# Patient Record
Sex: FEMALE | Race: WHITE | Employment: OTHER | URBAN - METROPOLITAN AREA
[De-identification: names, ages, dates, MRNs, and addresses within clinical notes are randomized per-mention and may not be internally consistent; named-entity substitution may affect disease eponyms.]

---

## 2017-03-10 ENCOUNTER — HOSPITAL ENCOUNTER (OUTPATIENT)
Dept: GENERAL RADIOLOGY | Age: 62
Discharge: HOME | End: 2017-03-10
Payer: COMMERCIAL

## 2017-03-10 ENCOUNTER — HOSPITAL ENCOUNTER (OUTPATIENT)
Age: 62
Discharge: HOME | End: 2017-03-10
Payer: COMMERCIAL

## 2017-03-10 DIAGNOSIS — J44.9 OBSTRUCTIVE CHRONIC BRONCHITIS WITHOUT EXACERBATION (HCC): ICD-10-CM

## 2017-03-10 PROCEDURE — 71020 XR CHEST STANDARD TWO VW: CPT

## 2017-04-17 ENCOUNTER — HOSPITAL ENCOUNTER (OUTPATIENT)
Age: 62
Discharge: HOME | End: 2017-04-17
Payer: COMMERCIAL

## 2017-04-17 LAB — VITAMIN D 25-HYDROXY: 14.8 NG/ML (ref 30–100)

## 2017-04-17 PROCEDURE — 36415 COLL VENOUS BLD VENIPUNCTURE: CPT

## 2017-04-17 PROCEDURE — 82306 VITAMIN D 25 HYDROXY: CPT

## 2017-04-17 PROCEDURE — 83036 HEMOGLOBIN GLYCOSYLATED A1C: CPT

## 2017-04-18 LAB
ESTIMATED AVERAGE GLUCOSE: 169 MG/DL
HBA1C MFR BLD: 7.5 % (ref 4–6)

## 2017-05-31 ENCOUNTER — HOSPITAL ENCOUNTER (EMERGENCY)
Age: 62
Discharge: HOME | End: 2017-05-31
Attending: EMERGENCY MEDICINE
Payer: COMMERCIAL

## 2017-05-31 ENCOUNTER — APPOINTMENT (OUTPATIENT)
Dept: GENERAL RADIOLOGY | Age: 62
End: 2017-05-31
Payer: COMMERCIAL

## 2017-05-31 VITALS
DIASTOLIC BLOOD PRESSURE: 64 MMHG | OXYGEN SATURATION: 94 % | RESPIRATION RATE: 16 BRPM | SYSTOLIC BLOOD PRESSURE: 104 MMHG | TEMPERATURE: 99 F | HEART RATE: 78 BPM

## 2017-05-31 DIAGNOSIS — S46.912A LEFT SHOULDER STRAIN, INITIAL ENCOUNTER: Primary | ICD-10-CM

## 2017-05-31 PROCEDURE — 73030 X-RAY EXAM OF SHOULDER: CPT

## 2017-05-31 PROCEDURE — 99283 EMERGENCY DEPT VISIT LOW MDM: CPT

## 2017-05-31 ASSESSMENT — PAIN DESCRIPTION - LOCATION: LOCATION: SHOULDER

## 2017-05-31 ASSESSMENT — PAIN SCALES - GENERAL: PAINLEVEL_OUTOF10: 2

## 2017-05-31 ASSESSMENT — ENCOUNTER SYMPTOMS
SORE THROAT: 0
SHORTNESS OF BREATH: 0
EYE DISCHARGE: 0
VOMITING: 0
COUGH: 0
ABDOMINAL PAIN: 0
EYE REDNESS: 0
NAUSEA: 0
BACK PAIN: 0

## 2017-05-31 ASSESSMENT — PAIN DESCRIPTION - FREQUENCY: FREQUENCY: CONTINUOUS

## 2017-05-31 ASSESSMENT — PAIN DESCRIPTION - ORIENTATION: ORIENTATION: LEFT

## 2017-05-31 ASSESSMENT — PAIN DESCRIPTION - PAIN TYPE: TYPE: ACUTE PAIN

## 2017-05-31 ASSESSMENT — PAIN DESCRIPTION - DESCRIPTORS: DESCRIPTORS: ACHING

## 2017-07-19 ENCOUNTER — HOSPITAL ENCOUNTER (OUTPATIENT)
Age: 62
Discharge: HOME | End: 2017-07-19
Payer: COMMERCIAL

## 2017-07-19 LAB
ALBUMIN SERPL-MCNC: 4.3 G/DL (ref 3.5–5.2)
ALBUMIN/GLOBULIN RATIO: 1.7 (ref 1–2.5)
ALP BLD-CCNC: 79 U/L (ref 35–104)
ALT SERPL-CCNC: 21 U/L (ref 5–33)
ANION GAP SERPL CALCULATED.3IONS-SCNC: 15 MMOL/L (ref 9–17)
AST SERPL-CCNC: 22 U/L
BILIRUB SERPL-MCNC: 0.78 MG/DL (ref 0.3–1.2)
BUN BLDV-MCNC: 10 MG/DL (ref 8–23)
BUN/CREAT BLD: ABNORMAL (ref 9–20)
CALCIUM IONIZED: 1.42 MMOL/L (ref 1.13–1.33)
CALCIUM SERPL-MCNC: 10.6 MG/DL (ref 8.6–10.4)
CHLORIDE BLD-SCNC: 100 MMOL/L (ref 98–107)
CHOLESTEROL, FASTING: 134 MG/DL
CHOLESTEROL/HDL RATIO: 2.4
CO2: 26 MMOL/L (ref 20–31)
CREAT SERPL-MCNC: 0.48 MG/DL (ref 0.5–0.9)
CREATININE URINE: 57.6 MG/DL (ref 28–217)
ESTIMATED AVERAGE GLUCOSE: 174 MG/DL
GFR AFRICAN AMERICAN: >60 ML/MIN
GFR NON-AFRICAN AMERICAN: >60 ML/MIN
GFR SERPL CREATININE-BSD FRML MDRD: ABNORMAL ML/MIN/{1.73_M2}
GFR SERPL CREATININE-BSD FRML MDRD: ABNORMAL ML/MIN/{1.73_M2}
GLUCOSE FASTING: 174 MG/DL (ref 70–99)
HBA1C MFR BLD: 7.7 % (ref 4–6)
HDLC SERPL-MCNC: 55 MG/DL
HEPATITIS C ANTIBODY: NONREACTIVE
HIV AG/AB: NONREACTIVE
HOMOCYSTEINE: 12.9 UMOL/L
LDL CHOLESTEROL: 52 MG/DL (ref 0–130)
MICROALBUMIN/CREAT 24H UR: <12 MG/L
MICROALBUMIN/CREAT UR-RTO: 21 MCG/MG CREAT
POTASSIUM SERPL-SCNC: 3.8 MMOL/L (ref 3.7–5.3)
PTH INTACT: 62.67 PG/ML (ref 15–65)
SODIUM BLD-SCNC: 141 MMOL/L (ref 135–144)
TOTAL PROTEIN: 6.8 G/DL (ref 6.4–8.3)
TRIGLYCERIDE, FASTING: 136 MG/DL
VITAMIN B-12: 304 PG/ML (ref 211–946)
VITAMIN D 25-HYDROXY: 50.9 NG/ML (ref 30–100)
VLDLC SERPL CALC-MCNC: NORMAL MG/DL (ref 1–30)

## 2017-07-19 PROCEDURE — 83921 ORGANIC ACID SINGLE QUANT: CPT

## 2017-07-19 PROCEDURE — 80061 LIPID PANEL: CPT

## 2017-07-19 PROCEDURE — 87389 HIV-1 AG W/HIV-1&-2 AB AG IA: CPT

## 2017-07-19 PROCEDURE — 82330 ASSAY OF CALCIUM: CPT

## 2017-07-19 PROCEDURE — 80053 COMPREHEN METABOLIC PANEL: CPT

## 2017-07-19 PROCEDURE — 83090 ASSAY OF HOMOCYSTEINE: CPT

## 2017-07-19 PROCEDURE — 36415 COLL VENOUS BLD VENIPUNCTURE: CPT

## 2017-07-19 PROCEDURE — 83970 ASSAY OF PARATHORMONE: CPT

## 2017-07-19 PROCEDURE — 83036 HEMOGLOBIN GLYCOSYLATED A1C: CPT

## 2017-07-19 PROCEDURE — 82570 ASSAY OF URINE CREATININE: CPT

## 2017-07-19 PROCEDURE — 82043 UR ALBUMIN QUANTITATIVE: CPT

## 2017-07-19 PROCEDURE — 82607 VITAMIN B-12: CPT

## 2017-07-19 PROCEDURE — 82306 VITAMIN D 25 HYDROXY: CPT

## 2017-07-19 PROCEDURE — 86803 HEPATITIS C AB TEST: CPT

## 2017-07-21 LAB — METHYLMALONIC ACID: <0.1 UMOL/L (ref 0–0.4)

## 2017-10-16 ENCOUNTER — HOSPITAL ENCOUNTER (OUTPATIENT)
Age: 62
Discharge: HOME | End: 2017-10-16
Payer: MEDICARE

## 2017-10-16 PROCEDURE — 36415 COLL VENOUS BLD VENIPUNCTURE: CPT

## 2017-10-16 PROCEDURE — 83036 HEMOGLOBIN GLYCOSYLATED A1C: CPT

## 2017-10-17 LAB
ESTIMATED AVERAGE GLUCOSE: 166 MG/DL
HBA1C MFR BLD: 7.4 % (ref 4–6)

## 2018-01-23 ENCOUNTER — HOSPITAL ENCOUNTER (OUTPATIENT)
Age: 63
Discharge: HOME | End: 2018-01-23
Payer: MEDICARE

## 2018-01-23 LAB
ABSOLUTE EOS #: 0.2 K/UL (ref 0–0.4)
ABSOLUTE IMMATURE GRANULOCYTE: NORMAL K/UL (ref 0–0.3)
ABSOLUTE LYMPH #: 2.4 K/UL (ref 1–4.8)
ABSOLUTE MONO #: 0.6 K/UL (ref 0.1–1.2)
ALBUMIN SERPL-MCNC: 4.4 G/DL (ref 3.5–5.2)
ALBUMIN/GLOBULIN RATIO: 1.6 (ref 1–2.5)
ALP BLD-CCNC: 79 U/L (ref 35–104)
ALT SERPL-CCNC: 19 U/L (ref 5–33)
ANION GAP SERPL CALCULATED.3IONS-SCNC: 15 MMOL/L (ref 9–17)
AST SERPL-CCNC: 17 U/L
BASOPHILS # BLD: 0 % (ref 0–2)
BASOPHILS ABSOLUTE: 0 K/UL (ref 0–0.2)
BILIRUB SERPL-MCNC: 0.92 MG/DL (ref 0.3–1.2)
BUN BLDV-MCNC: 8 MG/DL (ref 8–23)
BUN/CREAT BLD: ABNORMAL (ref 9–20)
CALCIUM SERPL-MCNC: 10.5 MG/DL (ref 8.6–10.4)
CHLORIDE BLD-SCNC: 101 MMOL/L (ref 98–107)
CHOLESTEROL, FASTING: 140 MG/DL
CHOLESTEROL/HDL RATIO: 2.5
CO2: 27 MMOL/L (ref 20–31)
CREAT SERPL-MCNC: 0.45 MG/DL (ref 0.5–0.9)
CREATININE URINE: 124.3 MG/DL (ref 28–217)
DIFFERENTIAL TYPE: NORMAL
EOSINOPHILS RELATIVE PERCENT: 2 % (ref 1–4)
GFR AFRICAN AMERICAN: >60 ML/MIN
GFR NON-AFRICAN AMERICAN: >60 ML/MIN
GFR SERPL CREATININE-BSD FRML MDRD: ABNORMAL ML/MIN/{1.73_M2}
GFR SERPL CREATININE-BSD FRML MDRD: ABNORMAL ML/MIN/{1.73_M2}
GLUCOSE FASTING: 163 MG/DL (ref 70–99)
HCT VFR BLD CALC: 42.2 % (ref 36–46)
HDLC SERPL-MCNC: 57 MG/DL
HEMOGLOBIN: 13.9 G/DL (ref 12–16)
HOMOCYSTEINE: 13.9 UMOL/L
IMMATURE GRANULOCYTES: NORMAL %
LDL CHOLESTEROL: 49 MG/DL (ref 0–130)
LYMPHOCYTES # BLD: 27 % (ref 24–44)
MAGNESIUM: 2.2 MG/DL (ref 1.6–2.6)
MCH RBC QN AUTO: 29.6 PG (ref 26–34)
MCHC RBC AUTO-ENTMCNC: 33 G/DL (ref 31–37)
MCV RBC AUTO: 89.7 FL (ref 80–100)
MICROALBUMIN/CREAT 24H UR: <12 MG/L
MICROALBUMIN/CREAT UR-RTO: NORMAL MCG/MG CREAT
MONOCYTES # BLD: 7 % (ref 2–11)
NRBC AUTOMATED: NORMAL PER 100 WBC
PDW BLD-RTO: 13.3 % (ref 12.5–15.4)
PLATELET # BLD: 237 K/UL (ref 140–450)
PLATELET ESTIMATE: NORMAL
PMV BLD AUTO: 10.6 FL (ref 6–12)
POTASSIUM SERPL-SCNC: 4.1 MMOL/L (ref 3.7–5.3)
RBC # BLD: 4.7 M/UL (ref 4–5.2)
RBC # BLD: NORMAL 10*6/UL
SEG NEUTROPHILS: 64 % (ref 36–66)
SEGMENTED NEUTROPHILS ABSOLUTE COUNT: 5.6 K/UL (ref 1.8–7.7)
SODIUM BLD-SCNC: 143 MMOL/L (ref 135–144)
TOTAL PROTEIN: 7.1 G/DL (ref 6.4–8.3)
TRIGLYCERIDE, FASTING: 170 MG/DL
VITAMIN B-12: 261 PG/ML (ref 232–1245)
VITAMIN D 25-HYDROXY: 23.4 NG/ML (ref 30–100)
VLDLC SERPL CALC-MCNC: ABNORMAL MG/DL (ref 1–30)
WBC # BLD: 8.8 K/UL (ref 3.5–11)
WBC # BLD: NORMAL 10*3/UL

## 2018-01-23 PROCEDURE — 82607 VITAMIN B-12: CPT

## 2018-01-23 PROCEDURE — 82043 UR ALBUMIN QUANTITATIVE: CPT

## 2018-01-23 PROCEDURE — 82306 VITAMIN D 25 HYDROXY: CPT

## 2018-01-23 PROCEDURE — 83921 ORGANIC ACID SINGLE QUANT: CPT

## 2018-01-23 PROCEDURE — 83090 ASSAY OF HOMOCYSTEINE: CPT

## 2018-01-23 PROCEDURE — 80053 COMPREHEN METABOLIC PANEL: CPT

## 2018-01-23 PROCEDURE — 80061 LIPID PANEL: CPT

## 2018-01-23 PROCEDURE — 82570 ASSAY OF URINE CREATININE: CPT

## 2018-01-23 PROCEDURE — 85025 COMPLETE CBC W/AUTO DIFF WBC: CPT

## 2018-01-23 PROCEDURE — 83735 ASSAY OF MAGNESIUM: CPT

## 2018-01-26 LAB — METHYLMALONIC ACID: 0.11 UMOL/L (ref 0–0.4)

## 2018-02-06 ENCOUNTER — HOSPITAL ENCOUNTER (OUTPATIENT)
Age: 63
Discharge: HOME | End: 2018-02-06
Payer: MEDICARE

## 2018-02-06 ENCOUNTER — HOSPITAL ENCOUNTER (OUTPATIENT)
Dept: ULTRASOUND IMAGING | Age: 63
Discharge: HOME | End: 2018-02-08
Payer: MEDICARE

## 2018-02-06 DIAGNOSIS — E83.52 HYPERCALCEMIA: ICD-10-CM

## 2018-02-06 LAB
CALCIUM IONIZED: 1.51 MMOL/L (ref 1.13–1.33)
PTH INTACT: 77.27 PG/ML (ref 15–65)
VITAMIN D 25-HYDROXY: 25.8 NG/ML (ref 30–100)

## 2018-02-06 PROCEDURE — 82306 VITAMIN D 25 HYDROXY: CPT

## 2018-02-06 PROCEDURE — 83970 ASSAY OF PARATHORMONE: CPT

## 2018-02-06 PROCEDURE — 82330 ASSAY OF CALCIUM: CPT

## 2018-02-06 PROCEDURE — 36415 COLL VENOUS BLD VENIPUNCTURE: CPT

## 2018-02-06 PROCEDURE — 76536 US EXAM OF HEAD AND NECK: CPT

## 2020-10-27 ENCOUNTER — HOSPITAL ENCOUNTER (OUTPATIENT)
Dept: MAMMOGRAPHY | Age: 65
Discharge: HOME | End: 2020-10-29
Payer: MEDICARE

## 2020-10-27 PROCEDURE — 77063 BREAST TOMOSYNTHESIS BI: CPT

## 2021-01-04 ENCOUNTER — HOSPITAL ENCOUNTER (OUTPATIENT)
Dept: WOMENS IMAGING | Age: 66
Discharge: HOME | End: 2021-01-06
Payer: MEDICARE

## 2021-01-04 DIAGNOSIS — E04.9 GOITER: ICD-10-CM

## 2021-01-04 DIAGNOSIS — M81.0 AGE-RELATED OSTEOPOROSIS WITHOUT CURRENT PATHOLOGICAL FRACTURE: ICD-10-CM

## 2021-01-04 DIAGNOSIS — E21.0 PRIMARY HYPERPARATHYROIDISM (HCC): ICD-10-CM

## 2021-01-04 PROCEDURE — 77080 DXA BONE DENSITY AXIAL: CPT

## 2021-01-04 PROCEDURE — 76536 US EXAM OF HEAD AND NECK: CPT

## 2022-03-16 ENCOUNTER — HOSPITAL ENCOUNTER (OUTPATIENT)
Dept: NON INVASIVE DIAGNOSTICS | Age: 67
Discharge: HOME | End: 2022-03-18
Payer: MEDICARE

## 2022-03-16 DIAGNOSIS — J44.9 CHRONIC OBSTRUCTIVE PULMONARY DISEASE, UNSPECIFIED COPD TYPE (HCC): ICD-10-CM

## 2022-03-16 DIAGNOSIS — R06.09 DYSPNEA ON EXERTION: ICD-10-CM

## 2022-03-16 LAB
LV EF: 55 %
LVEF MODALITY: NORMAL

## 2022-03-16 PROCEDURE — 93306 TTE W/DOPPLER COMPLETE: CPT

## 2022-05-12 ENCOUNTER — HOSPITAL ENCOUNTER (OUTPATIENT)
Age: 67
Discharge: HOME | End: 2022-05-14

## 2022-05-12 ENCOUNTER — HOSPITAL ENCOUNTER (OUTPATIENT)
Age: 67
Discharge: HOME | End: 2022-05-12
Payer: MEDICARE

## 2022-05-12 ENCOUNTER — HOSPITAL ENCOUNTER (OUTPATIENT)
Dept: GENERAL RADIOLOGY | Age: 67
Discharge: HOME | End: 2022-05-14
Payer: MEDICARE

## 2022-05-12 DIAGNOSIS — R06.09 OTHER FORM OF DYSPNEA: ICD-10-CM

## 2022-05-12 LAB
ANION GAP SERPL CALCULATED.3IONS-SCNC: 14 MMOL/L (ref 9–17)
BUN BLDV-MCNC: 8 MG/DL (ref 8–23)
CALCIUM IONIZED: 1.31 MMOL/L (ref 1.13–1.33)
CALCIUM SERPL-MCNC: 9.5 MG/DL (ref 8.6–10.4)
CHLORIDE BLD-SCNC: 99 MMOL/L (ref 98–107)
CO2: 24 MMOL/L (ref 20–31)
CREAT SERPL-MCNC: 0.55 MG/DL (ref 0.5–0.9)
GFR AFRICAN AMERICAN: >60 ML/MIN
GFR NON-AFRICAN AMERICAN: >60 ML/MIN
GFR SERPL CREATININE-BSD FRML MDRD: NORMAL ML/MIN/{1.73_M2}
GLUCOSE BLD-MCNC: 95 MG/DL (ref 70–99)
PHOSPHORUS: 3.7 MG/DL (ref 2.6–4.5)
POTASSIUM SERPL-SCNC: 4.2 MMOL/L (ref 3.7–5.3)
PTH INTACT: 44.48 PG/ML (ref 15–65)
SODIUM BLD-SCNC: 137 MMOL/L (ref 135–144)

## 2022-05-12 PROCEDURE — 36415 COLL VENOUS BLD VENIPUNCTURE: CPT

## 2022-05-12 PROCEDURE — 83970 ASSAY OF PARATHORMONE: CPT

## 2022-05-12 PROCEDURE — 83036 HEMOGLOBIN GLYCOSYLATED A1C: CPT

## 2022-05-12 PROCEDURE — 80048 BASIC METABOLIC PNL TOTAL CA: CPT

## 2022-05-12 PROCEDURE — 82330 ASSAY OF CALCIUM: CPT

## 2022-05-12 PROCEDURE — 84100 ASSAY OF PHOSPHORUS: CPT

## 2022-05-12 PROCEDURE — 71046 X-RAY EXAM CHEST 2 VIEWS: CPT

## 2022-05-13 LAB
ESTIMATED AVERAGE GLUCOSE: 157 MG/DL
HBA1C MFR BLD: 7.1 % (ref 4–6)

## 2022-08-19 ENCOUNTER — HOSPITAL ENCOUNTER (OUTPATIENT)
Age: 67
Discharge: HOME | End: 2022-08-19
Payer: MEDICARE

## 2022-08-19 LAB
ANION GAP SERPL CALCULATED.3IONS-SCNC: 16 MMOL/L (ref 9–17)
BUN BLDV-MCNC: 11 MG/DL (ref 8–23)
CALCIUM SERPL-MCNC: 9.4 MG/DL (ref 8.6–10.4)
CHLORIDE BLD-SCNC: 105 MMOL/L (ref 98–107)
CO2: 23 MMOL/L (ref 20–31)
CREAT SERPL-MCNC: 0.58 MG/DL (ref 0.5–0.9)
CREATININE URINE: 49.6 MG/DL (ref 28–217)
GFR AFRICAN AMERICAN: >60 ML/MIN
GFR NON-AFRICAN AMERICAN: >60 ML/MIN
GFR SERPL CREATININE-BSD FRML MDRD: NORMAL ML/MIN/{1.73_M2}
GLUCOSE BLD-MCNC: 97 MG/DL (ref 70–99)
MICROALBUMIN/CREAT 24H UR: 41 MG/L
MICROALBUMIN/CREAT UR-RTO: 83 MCG/MG CREAT
POTASSIUM SERPL-SCNC: 4.1 MMOL/L (ref 3.7–5.3)
SODIUM BLD-SCNC: 144 MMOL/L (ref 135–144)

## 2022-08-19 PROCEDURE — 82043 UR ALBUMIN QUANTITATIVE: CPT

## 2022-08-19 PROCEDURE — 82570 ASSAY OF URINE CREATININE: CPT

## 2022-08-19 PROCEDURE — 36415 COLL VENOUS BLD VENIPUNCTURE: CPT

## 2022-08-19 PROCEDURE — 80048 BASIC METABOLIC PNL TOTAL CA: CPT

## 2022-08-19 PROCEDURE — 83036 HEMOGLOBIN GLYCOSYLATED A1C: CPT

## 2022-08-20 LAB
ESTIMATED AVERAGE GLUCOSE: 151 MG/DL
HBA1C MFR BLD: 6.9 % (ref 4–6)

## 2023-01-05 ENCOUNTER — HOSPITAL ENCOUNTER (OUTPATIENT)
Age: 68
Discharge: HOME | End: 2023-01-05
Payer: MEDICARE

## 2023-01-05 LAB
ANION GAP SERPL CALCULATED.3IONS-SCNC: 14 MMOL/L (ref 9–17)
BUN BLDV-MCNC: 12 MG/DL (ref 8–23)
CALCIUM IONIZED: 1.32 MMOL/L (ref 1.13–1.33)
CALCIUM SERPL-MCNC: 9.8 MG/DL (ref 8.6–10.4)
CHLORIDE BLD-SCNC: 101 MMOL/L (ref 98–107)
CO2: 25 MMOL/L (ref 20–31)
CREAT SERPL-MCNC: 0.65 MG/DL (ref 0.5–0.9)
CREATININE URINE: 51.1 MG/DL (ref 28–217)
ESTIMATED AVERAGE GLUCOSE: 143 MG/DL
GFR SERPL CREATININE-BSD FRML MDRD: >60 ML/MIN/1.73M2
GLUCOSE BLD-MCNC: 135 MG/DL (ref 70–99)
HBA1C MFR BLD: 6.6 % (ref 4–6)
MAGNESIUM: 2.1 MG/DL (ref 1.6–2.6)
MICROALBUMIN/CREAT 24H UR: 44 MG/L
MICROALBUMIN/CREAT UR-RTO: 86 MCG/MG CREAT
PHOSPHORUS: 4.3 MG/DL (ref 2.6–4.5)
POTASSIUM SERPL-SCNC: 4.1 MMOL/L (ref 3.7–5.3)
PTH INTACT: 78.6 PG/ML (ref 14–72)
SODIUM BLD-SCNC: 140 MMOL/L (ref 135–144)
THYROXINE, FREE: 1.01 NG/DL (ref 0.93–1.7)
TSH SERPL DL<=0.05 MIU/L-ACNC: 1.93 UIU/ML (ref 0.3–5)
VITAMIN D 25-HYDROXY: 40.3 NG/ML

## 2023-01-05 PROCEDURE — 82306 VITAMIN D 25 HYDROXY: CPT

## 2023-01-05 PROCEDURE — 83036 HEMOGLOBIN GLYCOSYLATED A1C: CPT

## 2023-01-05 PROCEDURE — 82570 ASSAY OF URINE CREATININE: CPT

## 2023-01-05 PROCEDURE — 84443 ASSAY THYROID STIM HORMONE: CPT

## 2023-01-05 PROCEDURE — 83970 ASSAY OF PARATHORMONE: CPT

## 2023-01-05 PROCEDURE — 36415 COLL VENOUS BLD VENIPUNCTURE: CPT

## 2023-01-05 PROCEDURE — 82330 ASSAY OF CALCIUM: CPT

## 2023-01-05 PROCEDURE — 84100 ASSAY OF PHOSPHORUS: CPT

## 2023-01-05 PROCEDURE — 84439 ASSAY OF FREE THYROXINE: CPT

## 2023-01-05 PROCEDURE — 82043 UR ALBUMIN QUANTITATIVE: CPT

## 2023-01-05 PROCEDURE — 83735 ASSAY OF MAGNESIUM: CPT

## 2023-01-05 PROCEDURE — 80048 BASIC METABOLIC PNL TOTAL CA: CPT

## 2023-01-19 ENCOUNTER — HOSPITAL ENCOUNTER (OUTPATIENT)
Dept: ULTRASOUND IMAGING | Age: 68
Discharge: HOME | End: 2023-01-21
Payer: MEDICARE

## 2023-01-19 ENCOUNTER — HOSPITAL ENCOUNTER (OUTPATIENT)
Dept: MAMMOGRAPHY | Age: 68
Discharge: HOME | End: 2023-01-21
Payer: MEDICARE

## 2023-01-19 DIAGNOSIS — M81.8 OSTEOPOROSIS OF DISUSE: ICD-10-CM

## 2023-01-19 DIAGNOSIS — E04.2 NONTOXIC MULTINODULAR GOITER: ICD-10-CM

## 2023-01-19 PROCEDURE — 77080 DXA BONE DENSITY AXIAL: CPT

## 2023-01-19 PROCEDURE — 76536 US EXAM OF HEAD AND NECK: CPT

## 2023-05-04 ENCOUNTER — HOSPITAL ENCOUNTER (OUTPATIENT)
Age: 68
Discharge: HOME | End: 2023-05-04
Payer: MEDICARE

## 2023-05-04 LAB
EST. AVERAGE GLUCOSE BLD GHB EST-MCNC: 151 MG/DL
HBA1C MFR BLD: 6.9 % (ref 4–6)

## 2023-05-04 PROCEDURE — 36415 COLL VENOUS BLD VENIPUNCTURE: CPT

## 2023-05-04 PROCEDURE — 83036 HEMOGLOBIN GLYCOSYLATED A1C: CPT

## 2023-07-10 ENCOUNTER — HOSPITAL ENCOUNTER (OUTPATIENT)
Age: 68
Discharge: HOME | End: 2023-07-10
Payer: MEDICARE

## 2023-07-10 ENCOUNTER — HOSPITAL ENCOUNTER (OUTPATIENT)
Age: 68
Discharge: HOME | End: 2023-07-12
Payer: MEDICARE

## 2023-07-10 ENCOUNTER — HOSPITAL ENCOUNTER (OUTPATIENT)
Dept: GENERAL RADIOLOGY | Age: 68
Discharge: HOME | End: 2023-07-12
Payer: MEDICARE

## 2023-07-10 DIAGNOSIS — J44.1 OBSTRUCTIVE CHRONIC BRONCHITIS WITH EXACERBATION (HCC): ICD-10-CM

## 2023-07-10 PROCEDURE — 71046 X-RAY EXAM CHEST 2 VIEWS: CPT

## 2023-07-10 PROCEDURE — 36415 COLL VENOUS BLD VENIPUNCTURE: CPT

## 2023-07-10 PROCEDURE — 85027 COMPLETE CBC AUTOMATED: CPT

## 2023-07-11 LAB
BASOPHILS # BLD: 0 K/UL (ref 0–0.2)
BASOPHILS NFR BLD: 0 % (ref 0–2)
EOSINOPHIL # BLD: 0.17 K/UL (ref 0–0.4)
EOSINOPHILS RELATIVE PERCENT: 1 % (ref 1–4)
ERYTHROCYTE [DISTWIDTH] IN BLOOD BY AUTOMATED COUNT: 13.4 % (ref 11.8–14.4)
HCT VFR BLD AUTO: 45.1 % (ref 36.3–47.1)
HGB BLD-MCNC: 13.5 G/DL (ref 11.9–15.1)
IMM GRANULOCYTES # BLD AUTO: 0 K/UL (ref 0–0.3)
IMM GRANULOCYTES NFR BLD: 0 %
LYMPHOCYTES # BLD: 12 % (ref 24–44)
LYMPHOCYTES NFR BLD: 1.99 K/UL (ref 1–4.8)
MCH RBC QN AUTO: 28.6 PG (ref 25.2–33.5)
MCHC RBC AUTO-ENTMCNC: 29.9 G/DL (ref 28.4–34.8)
MCV RBC AUTO: 95.6 FL (ref 82.6–102.9)
MONOCYTES NFR BLD: 1.33 K/UL (ref 0.1–0.8)
MONOCYTES NFR BLD: 8 % (ref 1–7)
MORPHOLOGY: NORMAL
NEUTROPHILS NFR BLD: 79 % (ref 36–66)
NEUTS SEG NFR BLD: 13.11 K/UL (ref 1.8–7.7)
NRBC BLD-RTO: 0 PER 100 WBC
PLATELET # BLD AUTO: 323 K/UL (ref 138–453)
PMV BLD AUTO: 10.8 FL (ref 8.1–13.5)
RBC # BLD AUTO: 4.72 M/UL (ref 3.95–5.11)
WBC OTHER # BLD: 16.6 K/UL (ref 3.5–11.3)

## 2023-08-02 ENCOUNTER — HOSPITAL ENCOUNTER (OUTPATIENT)
Age: 68
Discharge: HOME | End: 2023-08-02
Payer: MEDICARE

## 2023-08-02 LAB
BACTERIA URNS QL MICRO: ABNORMAL
BILIRUB UR QL STRIP: NEGATIVE
CLARITY UR: CLEAR
COLOR UR: YELLOW
EPI CELLS #/AREA URNS HPF: ABNORMAL /HPF (ref 0–5)
GLUCOSE UR STRIP-MCNC: ABNORMAL MG/DL
HGB UR QL STRIP.AUTO: NEGATIVE
KETONES UR STRIP-MCNC: NEGATIVE MG/DL
LEUKOCYTE ESTERASE UR QL STRIP: ABNORMAL
NITRITE UR QL STRIP: NEGATIVE
PH UR STRIP: 6 [PH] (ref 5–8)
PROT UR STRIP-MCNC: NEGATIVE MG/DL
RBC #/AREA URNS HPF: ABNORMAL /HPF (ref 0–4)
SP GR UR STRIP: 1.01 (ref 1–1.03)
UROBILINOGEN UR STRIP-ACNC: NORMAL EU/DL (ref 0–1)
WBC #/AREA URNS HPF: ABNORMAL /HPF (ref 0–5)

## 2023-08-02 PROCEDURE — 87184 SC STD DISK METHOD PER PLATE: CPT

## 2023-08-02 PROCEDURE — 81001 URINALYSIS AUTO W/SCOPE: CPT

## 2023-08-02 PROCEDURE — 87186 SC STD MICRODIL/AGAR DIL: CPT

## 2023-08-02 PROCEDURE — 87086 URINE CULTURE/COLONY COUNT: CPT

## 2023-08-02 PROCEDURE — 87077 CULTURE AEROBIC IDENTIFY: CPT

## 2023-08-05 LAB
MICROORGANISM SPEC CULT: ABNORMAL
SPECIMEN DESCRIPTION: ABNORMAL

## 2023-08-18 ENCOUNTER — HOSPITAL ENCOUNTER (OUTPATIENT)
Age: 68
Discharge: HOME | End: 2023-08-18
Payer: MEDICARE

## 2023-08-18 LAB
25(OH)D3 SERPL-MCNC: 32.6 NG/ML
25(OH)D3 SERPL-MCNC: 32.6 NG/ML
ALBUMIN SERPL-MCNC: 4.1 G/DL (ref 3.5–5.2)
ALBUMIN/GLOB SERPL: 1.5 {RATIO} (ref 1–2.5)
ALP SERPL-CCNC: 83 U/L (ref 35–104)
ALT SERPL-CCNC: 18 U/L (ref 5–33)
ANION GAP SERPL CALCULATED.3IONS-SCNC: 11 MMOL/L (ref 9–17)
AST SERPL-CCNC: 20 U/L
BILIRUB SERPL-MCNC: 0.6 MG/DL (ref 0.3–1.2)
BUN SERPL-MCNC: 10 MG/DL (ref 8–23)
CA-I BLD-SCNC: 1.31 MMOL/L (ref 1.13–1.33)
CALCIUM SERPL-MCNC: 9.6 MG/DL (ref 8.6–10.4)
CALCIUM SERPL-MCNC: 9.6 MG/DL (ref 8.6–10.4)
CHLORIDE SERPL-SCNC: 100 MMOL/L (ref 98–107)
CHOLEST SERPL-MCNC: 263 MG/DL
CHOLESTEROL/HDL RATIO: 5.4
CO2 SERPL-SCNC: 24 MMOL/L (ref 20–31)
CREAT SERPL-MCNC: 0.7 MG/DL (ref 0.5–0.9)
CRP SERPL HS-MCNC: 3.2 MG/L (ref 0–5)
ERYTHROCYTE [SEDIMENTATION RATE] IN BLOOD BY PHOTOMETRIC METHOD: 21 MM/HR (ref 0–30)
GFR SERPL CREATININE-BSD FRML MDRD: >60 ML/MIN/1.73M2
GLUCOSE P FAST SERPL-MCNC: 129 MG/DL (ref 70–99)
HDLC SERPL-MCNC: 49 MG/DL
LDLC SERPL CALC-MCNC: 157 MG/DL (ref 0–130)
PHOSPHATE SERPL-MCNC: 3.9 MG/DL (ref 2.6–4.5)
POTASSIUM SERPL-SCNC: 4.6 MMOL/L (ref 3.7–5.3)
PROT SERPL-MCNC: 6.9 G/DL (ref 6.4–8.3)
PTH-INTACT SERPL-MCNC: 68.1 PG/ML (ref 14–72)
PTH-INTACT SERPL-MCNC: 68.1 PG/ML (ref 14–72)
SODIUM SERPL-SCNC: 135 MMOL/L (ref 135–144)
T4 FREE SERPL-MCNC: 1 NG/DL (ref 0.9–1.7)
TRIGL SERPL-MCNC: 284 MG/DL
TSH SERPL DL<=0.05 MIU/L-ACNC: 2.4 UIU/ML (ref 0.3–5)

## 2023-08-18 PROCEDURE — 82306 VITAMIN D 25 HYDROXY: CPT

## 2023-08-18 PROCEDURE — 80061 LIPID PANEL: CPT

## 2023-08-18 PROCEDURE — 84439 ASSAY OF FREE THYROXINE: CPT

## 2023-08-18 PROCEDURE — 83970 ASSAY OF PARATHORMONE: CPT

## 2023-08-18 PROCEDURE — 84100 ASSAY OF PHOSPHORUS: CPT

## 2023-08-18 PROCEDURE — 80053 COMPREHEN METABOLIC PANEL: CPT

## 2023-08-18 PROCEDURE — 86140 C-REACTIVE PROTEIN: CPT

## 2023-08-18 PROCEDURE — 85652 RBC SED RATE AUTOMATED: CPT

## 2023-08-18 PROCEDURE — 82330 ASSAY OF CALCIUM: CPT

## 2023-08-18 PROCEDURE — 84443 ASSAY THYROID STIM HORMONE: CPT

## 2023-09-20 ENCOUNTER — HOSPITAL ENCOUNTER (OUTPATIENT)
Age: 68
Discharge: HOME | End: 2023-09-20
Payer: MEDICARE

## 2023-09-20 LAB
CALCIUM SERPL-MCNC: 11 MG/DL (ref 8.6–10.4)
PTH-INTACT SERPL-MCNC: 61.2 PG/ML (ref 14–72)

## 2023-09-20 PROCEDURE — 82310 ASSAY OF CALCIUM: CPT

## 2023-09-20 PROCEDURE — 36415 COLL VENOUS BLD VENIPUNCTURE: CPT

## 2023-09-20 PROCEDURE — 83970 ASSAY OF PARATHORMONE: CPT

## 2023-11-02 ENCOUNTER — HOSPITAL ENCOUNTER (OUTPATIENT)
Age: 68
Discharge: HOME | End: 2023-11-02
Payer: MEDICARE

## 2023-11-02 LAB
CALCIUM SERPL-MCNC: 10.6 MG/DL (ref 8.6–10.4)
PTH-INTACT SERPL-MCNC: 72.6 PG/ML (ref 14–72)

## 2023-11-02 PROCEDURE — 82310 ASSAY OF CALCIUM: CPT

## 2023-11-02 PROCEDURE — 36415 COLL VENOUS BLD VENIPUNCTURE: CPT

## 2023-11-02 PROCEDURE — 83970 ASSAY OF PARATHORMONE: CPT

## 2023-11-19 ENCOUNTER — APPOINTMENT (OUTPATIENT)
Dept: CT IMAGING | Age: 68
DRG: 394 | End: 2023-11-19
Payer: MEDICARE

## 2023-11-19 ENCOUNTER — HOSPITAL ENCOUNTER (INPATIENT)
Age: 68
LOS: 1 days | Discharge: HOME | DRG: 394 | End: 2023-11-20
Attending: EMERGENCY MEDICINE | Admitting: STUDENT IN AN ORGANIZED HEALTH CARE EDUCATION/TRAINING PROGRAM
Payer: MEDICARE

## 2023-11-19 DIAGNOSIS — K43.6 STRANGULATED HERNIA OF ABDOMINAL WALL: Primary | ICD-10-CM

## 2023-11-19 PROBLEM — K43.0 INCARCERATED INCISIONAL HERNIA: Status: ACTIVE | Noted: 2023-11-19

## 2023-11-19 LAB
ALBUMIN SERPL-MCNC: 4.5 G/DL (ref 3.5–5.2)
ALBUMIN/GLOB SERPL: 1.5 {RATIO} (ref 1–2.5)
ALP SERPL-CCNC: 69 U/L (ref 35–104)
ALT SERPL-CCNC: 17 U/L (ref 5–33)
ANION GAP SERPL CALCULATED.3IONS-SCNC: 13 MMOL/L (ref 9–17)
AST SERPL-CCNC: 19 U/L
BASOPHILS # BLD: 0.1 K/UL (ref 0–0.2)
BASOPHILS NFR BLD: 1 % (ref 0–2)
BILIRUB SERPL-MCNC: 1 MG/DL (ref 0.3–1.2)
BILIRUB UR QL STRIP: NEGATIVE
BUN SERPL-MCNC: 9 MG/DL (ref 8–23)
CALCIUM SERPL-MCNC: 10.5 MG/DL (ref 8.6–10.4)
CHLORIDE SERPL-SCNC: 99 MMOL/L (ref 98–107)
CLARITY UR: CLEAR
CO2 SERPL-SCNC: 24 MMOL/L (ref 20–31)
COLOR UR: YELLOW
COMMENT: ABNORMAL
CREAT SERPL-MCNC: 0.6 MG/DL (ref 0.5–0.9)
EOSINOPHIL # BLD: 0.2 K/UL (ref 0–0.4)
EOSINOPHILS RELATIVE PERCENT: 2 % (ref 1–4)
ERYTHROCYTE [DISTWIDTH] IN BLOOD BY AUTOMATED COUNT: 13.5 % (ref 12.5–15.4)
GFR SERPL CREATININE-BSD FRML MDRD: >60 ML/MIN/1.73M2
GLUCOSE BLD-MCNC: 114 MG/DL (ref 65–105)
GLUCOSE BLD-MCNC: 127 MG/DL (ref 65–105)
GLUCOSE SERPL-MCNC: 114 MG/DL (ref 70–99)
GLUCOSE UR STRIP-MCNC: ABNORMAL MG/DL
HCT VFR BLD AUTO: 44.4 % (ref 36–46)
HGB BLD-MCNC: 14.8 G/DL (ref 12–16)
HGB UR QL STRIP.AUTO: NEGATIVE
KETONES UR STRIP-MCNC: ABNORMAL MG/DL
LACTATE BLDV-SCNC: 1 MMOL/L (ref 0.5–2.2)
LEUKOCYTE ESTERASE UR QL STRIP: NEGATIVE
LIPASE SERPL-CCNC: 28 U/L (ref 13–60)
LYMPHOCYTES NFR BLD: 2.1 K/UL (ref 1–4.8)
LYMPHOCYTES RELATIVE PERCENT: 19 % (ref 24–44)
MCH RBC QN AUTO: 29.8 PG (ref 26–34)
MCHC RBC AUTO-ENTMCNC: 33.3 G/DL (ref 31–37)
MCV RBC AUTO: 89.5 FL (ref 80–100)
MONOCYTES NFR BLD: 0.9 K/UL (ref 0.1–1.2)
MONOCYTES NFR BLD: 8 % (ref 2–11)
NEUTROPHILS NFR BLD: 70 % (ref 36–66)
NEUTS SEG NFR BLD: 7.5 K/UL (ref 1.8–7.7)
NITRITE UR QL STRIP: NEGATIVE
PH UR STRIP: 6 [PH] (ref 5–8)
PLATELET # BLD AUTO: 237 K/UL (ref 140–450)
PMV BLD AUTO: 9 FL (ref 6–12)
POTASSIUM SERPL-SCNC: 3.5 MMOL/L (ref 3.7–5.3)
PROT SERPL-MCNC: 7.5 G/DL (ref 6.4–8.3)
PROT UR STRIP-MCNC: NEGATIVE MG/DL
RBC # BLD AUTO: 4.97 M/UL (ref 4–5.2)
SODIUM SERPL-SCNC: 136 MMOL/L (ref 135–144)
SP GR UR STRIP: 1.05 (ref 1–1.03)
UROBILINOGEN UR STRIP-ACNC: NORMAL EU/DL (ref 0–1)
WBC OTHER # BLD: 10.7 K/UL (ref 3.5–11)

## 2023-11-19 PROCEDURE — 6360000002 HC RX W HCPCS: Performed by: STUDENT IN AN ORGANIZED HEALTH CARE EDUCATION/TRAINING PROGRAM

## 2023-11-19 PROCEDURE — 83690 ASSAY OF LIPASE: CPT

## 2023-11-19 PROCEDURE — 99221 1ST HOSP IP/OBS SF/LOW 40: CPT | Performed by: STUDENT IN AN ORGANIZED HEALTH CARE EDUCATION/TRAINING PROGRAM

## 2023-11-19 PROCEDURE — 82947 ASSAY GLUCOSE BLOOD QUANT: CPT

## 2023-11-19 PROCEDURE — 99222 1ST HOSP IP/OBS MODERATE 55: CPT | Performed by: SURGERY

## 2023-11-19 PROCEDURE — 81003 URINALYSIS AUTO W/O SCOPE: CPT

## 2023-11-19 PROCEDURE — 96374 THER/PROPH/DIAG INJ IV PUSH: CPT

## 2023-11-19 PROCEDURE — 36415 COLL VENOUS BLD VENIPUNCTURE: CPT

## 2023-11-19 PROCEDURE — 74177 CT ABD & PELVIS W/CONTRAST: CPT

## 2023-11-19 PROCEDURE — 2580000003 HC RX 258: Performed by: NURSE PRACTITIONER

## 2023-11-19 PROCEDURE — 99285 EMERGENCY DEPT VISIT HI MDM: CPT

## 2023-11-19 PROCEDURE — 80053 COMPREHEN METABOLIC PANEL: CPT

## 2023-11-19 PROCEDURE — 6370000000 HC RX 637 (ALT 250 FOR IP): Performed by: STUDENT IN AN ORGANIZED HEALTH CARE EDUCATION/TRAINING PROGRAM

## 2023-11-19 PROCEDURE — 85025 COMPLETE CBC W/AUTO DIFF WBC: CPT

## 2023-11-19 PROCEDURE — 83605 ASSAY OF LACTIC ACID: CPT

## 2023-11-19 PROCEDURE — 6360000002 HC RX W HCPCS: Performed by: NURSE PRACTITIONER

## 2023-11-19 PROCEDURE — 6360000004 HC RX CONTRAST MEDICATION: Performed by: NURSE PRACTITIONER

## 2023-11-19 PROCEDURE — 1200000000 HC SEMI PRIVATE

## 2023-11-19 PROCEDURE — 2580000003 HC RX 258: Performed by: STUDENT IN AN ORGANIZED HEALTH CARE EDUCATION/TRAINING PROGRAM

## 2023-11-19 RX ORDER — ACETAMINOPHEN 325 MG/1
650 TABLET ORAL EVERY 6 HOURS PRN
Status: DISCONTINUED | OUTPATIENT
Start: 2023-11-19 | End: 2023-11-20 | Stop reason: HOSPADM

## 2023-11-19 RX ORDER — ALBUTEROL SULFATE 2.5 MG/3ML
2.5 SOLUTION RESPIRATORY (INHALATION) EVERY 6 HOURS PRN
Status: DISCONTINUED | OUTPATIENT
Start: 2023-11-19 | End: 2023-11-20 | Stop reason: HOSPADM

## 2023-11-19 RX ORDER — LOSARTAN POTASSIUM 25 MG/1
25 TABLET ORAL DAILY
COMMUNITY

## 2023-11-19 RX ORDER — CHLORAL HYDRATE 500 MG
CAPSULE ORAL 2 TIMES DAILY
COMMUNITY

## 2023-11-19 RX ORDER — 0.9 % SODIUM CHLORIDE 0.9 %
500 INTRAVENOUS SOLUTION INTRAVENOUS ONCE
Status: COMPLETED | OUTPATIENT
Start: 2023-11-19 | End: 2023-11-19

## 2023-11-19 RX ORDER — SODIUM CHLORIDE 0.9 % (FLUSH) 0.9 %
5-40 SYRINGE (ML) INJECTION EVERY 12 HOURS SCHEDULED
Status: DISCONTINUED | OUTPATIENT
Start: 2023-11-19 | End: 2023-11-20 | Stop reason: HOSPADM

## 2023-11-19 RX ORDER — INSULIN LISPRO 100 [IU]/ML
0-4 INJECTION, SOLUTION INTRAVENOUS; SUBCUTANEOUS NIGHTLY
Status: DISCONTINUED | OUTPATIENT
Start: 2023-11-19 | End: 2023-11-20 | Stop reason: HOSPADM

## 2023-11-19 RX ORDER — POTASSIUM CHLORIDE 7.45 MG/ML
10 INJECTION INTRAVENOUS
Status: DISCONTINUED | OUTPATIENT
Start: 2023-11-19 | End: 2023-11-19

## 2023-11-19 RX ORDER — ONDANSETRON 4 MG/1
4 TABLET, ORALLY DISINTEGRATING ORAL EVERY 8 HOURS PRN
Status: DISCONTINUED | OUTPATIENT
Start: 2023-11-19 | End: 2023-11-20 | Stop reason: HOSPADM

## 2023-11-19 RX ORDER — LOSARTAN POTASSIUM 25 MG/1
25 TABLET ORAL DAILY
Status: DISCONTINUED | OUTPATIENT
Start: 2023-11-19 | End: 2023-11-20 | Stop reason: HOSPADM

## 2023-11-19 RX ORDER — POTASSIUM CHLORIDE 20 MEQ/1
40 TABLET, EXTENDED RELEASE ORAL ONCE
Status: COMPLETED | OUTPATIENT
Start: 2023-11-19 | End: 2023-11-19

## 2023-11-19 RX ORDER — ENOXAPARIN SODIUM 100 MG/ML
40 INJECTION SUBCUTANEOUS DAILY
Status: DISCONTINUED | OUTPATIENT
Start: 2023-11-19 | End: 2023-11-20 | Stop reason: HOSPADM

## 2023-11-19 RX ORDER — INSULIN LISPRO 100 [IU]/ML
0-4 INJECTION, SOLUTION INTRAVENOUS; SUBCUTANEOUS
Status: DISCONTINUED | OUTPATIENT
Start: 2023-11-19 | End: 2023-11-20 | Stop reason: HOSPADM

## 2023-11-19 RX ORDER — ACETAMINOPHEN 650 MG/1
650 SUPPOSITORY RECTAL EVERY 6 HOURS PRN
Status: DISCONTINUED | OUTPATIENT
Start: 2023-11-19 | End: 2023-11-20 | Stop reason: HOSPADM

## 2023-11-19 RX ORDER — POTASSIUM CHLORIDE 20 MEQ/1
40 TABLET, EXTENDED RELEASE ORAL PRN
Status: DISCONTINUED | OUTPATIENT
Start: 2023-11-19 | End: 2023-11-20 | Stop reason: HOSPADM

## 2023-11-19 RX ORDER — SODIUM CHLORIDE 9 MG/ML
INJECTION, SOLUTION INTRAVENOUS PRN
Status: DISCONTINUED | OUTPATIENT
Start: 2023-11-19 | End: 2023-11-20 | Stop reason: HOSPADM

## 2023-11-19 RX ORDER — POTASSIUM CHLORIDE 7.45 MG/ML
10 INJECTION INTRAVENOUS PRN
Status: DISCONTINUED | OUTPATIENT
Start: 2023-11-19 | End: 2023-11-20 | Stop reason: HOSPADM

## 2023-11-19 RX ORDER — FENTANYL CITRATE 50 UG/ML
25 INJECTION, SOLUTION INTRAMUSCULAR; INTRAVENOUS ONCE
Status: COMPLETED | OUTPATIENT
Start: 2023-11-19 | End: 2023-11-19

## 2023-11-19 RX ORDER — 0.9 % SODIUM CHLORIDE 0.9 %
80 INTRAVENOUS SOLUTION INTRAVENOUS ONCE
Status: DISCONTINUED | OUTPATIENT
Start: 2023-11-19 | End: 2023-11-20 | Stop reason: HOSPADM

## 2023-11-19 RX ORDER — SODIUM CHLORIDE 0.9 % (FLUSH) 0.9 %
5-40 SYRINGE (ML) INJECTION PRN
Status: DISCONTINUED | OUTPATIENT
Start: 2023-11-19 | End: 2023-11-20 | Stop reason: HOSPADM

## 2023-11-19 RX ORDER — ONDANSETRON 2 MG/ML
4 INJECTION INTRAMUSCULAR; INTRAVENOUS ONCE
Status: COMPLETED | OUTPATIENT
Start: 2023-11-19 | End: 2023-11-19

## 2023-11-19 RX ORDER — FUROSEMIDE 20 MG/1
10 TABLET ORAL DAILY
COMMUNITY

## 2023-11-19 RX ORDER — ONDANSETRON 2 MG/ML
4 INJECTION INTRAMUSCULAR; INTRAVENOUS EVERY 6 HOURS PRN
Status: DISCONTINUED | OUTPATIENT
Start: 2023-11-19 | End: 2023-11-20 | Stop reason: HOSPADM

## 2023-11-19 RX ORDER — POLYETHYLENE GLYCOL 3350 17 G/17G
17 POWDER, FOR SOLUTION ORAL DAILY PRN
Status: DISCONTINUED | OUTPATIENT
Start: 2023-11-19 | End: 2023-11-20 | Stop reason: HOSPADM

## 2023-11-19 RX ORDER — SODIUM CHLORIDE 0.9 % (FLUSH) 0.9 %
10 SYRINGE (ML) INJECTION PRN
Status: DISCONTINUED | OUTPATIENT
Start: 2023-11-19 | End: 2023-11-20 | Stop reason: HOSPADM

## 2023-11-19 RX ORDER — MAGNESIUM SULFATE IN WATER 40 MG/ML
2000 INJECTION, SOLUTION INTRAVENOUS PRN
Status: DISCONTINUED | OUTPATIENT
Start: 2023-11-19 | End: 2023-11-20 | Stop reason: HOSPADM

## 2023-11-19 RX ADMIN — POTASSIUM CHLORIDE 40 MEQ: 1500 TABLET, EXTENDED RELEASE ORAL at 18:16

## 2023-11-19 RX ADMIN — SODIUM CHLORIDE, PRESERVATIVE FREE 10 ML: 5 INJECTION INTRAVENOUS at 14:30

## 2023-11-19 RX ADMIN — IOPAMIDOL 75 ML: 755 INJECTION, SOLUTION INTRAVENOUS at 14:29

## 2023-11-19 RX ADMIN — SODIUM CHLORIDE, PRESERVATIVE FREE 10 ML: 5 INJECTION INTRAVENOUS at 22:09

## 2023-11-19 RX ADMIN — FENTANYL CITRATE 25 MCG: 50 INJECTION, SOLUTION INTRAMUSCULAR; INTRAVENOUS at 14:19

## 2023-11-19 RX ADMIN — ONDANSETRON 4 MG: 2 INJECTION INTRAMUSCULAR; INTRAVENOUS at 14:19

## 2023-11-19 RX ADMIN — Medication 80 ML: at 14:30

## 2023-11-19 RX ADMIN — LOSARTAN POTASSIUM 25 MG: 25 TABLET, FILM COATED ORAL at 17:52

## 2023-11-19 RX ADMIN — POTASSIUM CHLORIDE 10 MEQ: 7.46 INJECTION, SOLUTION INTRAVENOUS at 17:30

## 2023-11-19 RX ADMIN — SODIUM CHLORIDE 500 ML: 9 INJECTION, SOLUTION INTRAVENOUS at 14:18

## 2023-11-19 ASSESSMENT — ENCOUNTER SYMPTOMS
TROUBLE SWALLOWING: 0
COLOR CHANGE: 0
ABDOMINAL PAIN: 1
BACK PAIN: 0
NAUSEA: 1
DIARRHEA: 0
BLOOD IN STOOL: 0
COUGH: 0
WHEEZING: 0
DIARRHEA: 1
SHORTNESS OF BREATH: 0
SORE THROAT: 0
VOMITING: 0
CONSTIPATION: 0
NAUSEA: 0
CHEST TIGHTNESS: 0

## 2023-11-19 ASSESSMENT — PAIN - FUNCTIONAL ASSESSMENT: PAIN_FUNCTIONAL_ASSESSMENT: 0-10

## 2023-11-19 ASSESSMENT — PAIN DESCRIPTION - PAIN TYPE: TYPE: ACUTE PAIN

## 2023-11-19 ASSESSMENT — PAIN SCALES - GENERAL: PAINLEVEL_OUTOF10: 3

## 2023-11-19 ASSESSMENT — PAIN DESCRIPTION - LOCATION: LOCATION: ABDOMEN

## 2023-11-19 NOTE — CONSULTS
hernias, with single suspected SB loop within the larger umbilical component, and considerable adjacent soft tissue stranding, as described above. No clear-cut small or large bowel obstruction, but early/developing strangulation or other inflammatory process should be considered, including some degree enteritis/mild terminal ileitis. Extensive diverticulosis, especially sigmoid, without CT evidence diverticulitis. Probable small simple cortical renal cysts. Assessment:    Leobardo Andrea is a 79 y.o. female with     Incarcerated incisional hernia, recurrence of prior ventral hernia repair, partially reduced  Multiple comorbidities    Plan:    We discussed the patient's clinical history and CT findings. I explained that the small bowel edema seen on imaging is a concerning finding and it is unclear whether the changes are due simply to edema from acute incarcerated and reduction vs evolving process due to ischemia from incarceration. At this time pt declines emergency surgery, she would is agreeable to admission and observation. We discussed the possibility of emergency surgery if her abdominal pain worsens or other related symptoms arise, she expressed understanding of this and is agreeable to this plan. Keep NPO, ok for ice chips and sips with medications.  Discussed with ED staff      Jo Blevins DO  11/19/2023

## 2023-11-19 NOTE — H&P
on CPAP, diabetes and hypertension, history of abdominal wall hernia with multiple surgeries done in the past who presented to emergency department with worsening generalized abdominal pain that started 2 days ago and is getting progressively worse, associated with episode of loose stool, reported that her urine becomes dark but she denies nausea denies vomiting, denies pain or burning with urination, denies chest pain fever chills denies shortness of breath, had lab remarkable for low potassium level, lactic acid normal, CT abdomen done and concerning for strangulating hernia in the abdominal wall, general surgery was consulted and patient was admitted for further evaluation and treatment    Past Medical History:     Past Medical History:   Diagnosis Date    Asthma     COPD (chronic obstructive pulmonary disease) ([address])     Diabetes mellitus ([address])     Hyperlipidemia         Past Surgical History:     Past Surgical History:   Procedure Laterality Date     SECTION      TWO    HERNIA REPAIR      HYSTERECTOMY          Medications Prior to Admission:     Prior to Admission medications    Medication Sig Start Date End Date Taking?  Authorizing Provider   empagliflozin (JARDIANCE) 25 MG tablet Take 1 tablet by mouth daily   Yes Henok Oliveira MD   Omega-3 Fatty Acids (FISH OIL) 1000 MG capsule Take by mouth 2 times daily   Yes Henok Oliveira MD   losartan (COZAAR) 25 MG tablet Take 1 tablet by mouth daily   Yes Henok Oliveira MD   furosemide (LASIX) 20 MG tablet Take 0.5 tablets by mouth daily   Yes Henok Oliveira MD   Sacubitril-Valsartan (ENTRESTO PO) Take by mouth    Henok Oliveira MD   metFORMIN (GLUCOPHAGE) 500 MG tablet Take 500 mg by mouth 2 times daily (with meals)    Henok Oliveira MD   Budesonide-Formoterol Fumarate (SYMBICORT IN) Inhale into the lungs  Patient not taking: Reported on 2023    Henok Oliveira MD   albuterol (PROVENTIL) (2.5 MG/3ML)

## 2023-11-20 VITALS
HEART RATE: 60 BPM | OXYGEN SATURATION: 100 % | RESPIRATION RATE: 18 BRPM | HEIGHT: 61 IN | DIASTOLIC BLOOD PRESSURE: 51 MMHG | WEIGHT: 209.44 LBS | SYSTOLIC BLOOD PRESSURE: 119 MMHG | BODY MASS INDEX: 39.54 KG/M2 | TEMPERATURE: 98.4 F

## 2023-11-20 PROBLEM — K43.6 STRANGULATED HERNIA OF ABDOMINAL WALL: Status: RESOLVED | Noted: 2023-11-19 | Resolved: 2023-11-20

## 2023-11-20 LAB
ANION GAP SERPL CALCULATED.3IONS-SCNC: 10 MMOL/L (ref 9–17)
BUN SERPL-MCNC: 8 MG/DL (ref 8–23)
CALCIUM SERPL-MCNC: 10.5 MG/DL (ref 8.6–10.4)
CHLORIDE SERPL-SCNC: 103 MMOL/L (ref 98–107)
CO2 SERPL-SCNC: 26 MMOL/L (ref 20–31)
CREAT SERPL-MCNC: 0.5 MG/DL (ref 0.5–0.9)
ERYTHROCYTE [DISTWIDTH] IN BLOOD BY AUTOMATED COUNT: 13.5 % (ref 12.5–15.4)
GFR SERPL CREATININE-BSD FRML MDRD: >60 ML/MIN/1.73M2
GLUCOSE BLD-MCNC: 137 MG/DL (ref 65–105)
GLUCOSE SERPL-MCNC: 123 MG/DL (ref 70–99)
HCT VFR BLD AUTO: 44.7 % (ref 36–46)
HGB BLD-MCNC: 14.8 G/DL (ref 12–16)
LACTATE BLDV-SCNC: 0.8 MMOL/L (ref 0.5–2.2)
MCH RBC QN AUTO: 30 PG (ref 26–34)
MCHC RBC AUTO-ENTMCNC: 33.1 G/DL (ref 31–37)
MCV RBC AUTO: 90.5 FL (ref 80–100)
PLATELET # BLD AUTO: 229 K/UL (ref 140–450)
PMV BLD AUTO: 9 FL (ref 6–12)
POTASSIUM SERPL-SCNC: 4.4 MMOL/L (ref 3.7–5.3)
RBC # BLD AUTO: 4.94 M/UL (ref 4–5.2)
SODIUM SERPL-SCNC: 139 MMOL/L (ref 135–144)
WBC OTHER # BLD: 7.8 K/UL (ref 3.5–11)

## 2023-11-20 PROCEDURE — 2580000003 HC RX 258: Performed by: STUDENT IN AN ORGANIZED HEALTH CARE EDUCATION/TRAINING PROGRAM

## 2023-11-20 PROCEDURE — 36415 COLL VENOUS BLD VENIPUNCTURE: CPT

## 2023-11-20 PROCEDURE — 82947 ASSAY GLUCOSE BLOOD QUANT: CPT

## 2023-11-20 PROCEDURE — 83605 ASSAY OF LACTIC ACID: CPT

## 2023-11-20 PROCEDURE — 99231 SBSQ HOSP IP/OBS SF/LOW 25: CPT | Performed by: SURGERY

## 2023-11-20 PROCEDURE — 80048 BASIC METABOLIC PNL TOTAL CA: CPT

## 2023-11-20 PROCEDURE — 99238 HOSP IP/OBS DSCHRG MGMT 30/<: CPT | Performed by: STUDENT IN AN ORGANIZED HEALTH CARE EDUCATION/TRAINING PROGRAM

## 2023-11-20 PROCEDURE — 85027 COMPLETE CBC AUTOMATED: CPT

## 2023-11-20 RX ADMIN — SODIUM CHLORIDE, PRESERVATIVE FREE 10 ML: 5 INJECTION INTRAVENOUS at 08:43

## 2023-11-20 NOTE — PLAN OF CARE
Problem: Discharge Planning  Goal: Discharge to home or other facility with appropriate resources  11/20/2023 1007 by Genesis Jain RN  Outcome: Completed     Problem: Safety - Adult  Goal: Free from fall injury  11/20/2023 1007 by Genesis Jain RN  Outcome: Completed     Problem: ABCDS Injury Assessment  Goal: Absence of physical injury  11/20/2023 1007 by Genesis Jain RN  Outcome: Completed     Problem: Chronic Conditions and Co-morbidities  Goal: Patient's chronic conditions and co-morbidity symptoms are monitored and maintained or improved  Outcome: Completed

## 2023-11-20 NOTE — DISCHARGE SUMMARY
[facility]  Office: [phone]  Jing Barnes, DO, Cerdic Kerr, DO, Clarisa Avalos MD, Mili Angulo MD, Richar Cantor MD, Tk Mohr MD,  Jonathan Taylor MD, Steve Spencer MD, Jesu Reyes MD,  Tish Kaplan MD, Flory Soares MD, Obinna Steve DO, Krystal Cazares MD,  Viola Frank DO, Ulysses Sprinkle, MD, Yessenia Renee MD, Carrie Alvarez MD, Twila Forde MD,  Yuriy Davis MD, Perico Cotto MD, Kayleigh Ovalle MD, Arnel Pozo MD, Kimmy Jacobsen MD, Erinn Alvarez DO, Helen Sheikh DO, Tad Irby MD,  Rama Curry MD, Dianelys Noel, CNP,  Jimmie Gonzales, CNP, Kelly Boothe, CNP,  Bria Trevino, [facility], Francis Shoulder, CNP, Ginny Key, CNP, Marixa Vazqueza, CNP, Janelle Cohen, CNP, Frank Nichole, CNP, Yadiel Guerra, [facility], Juana Aguilar, CNP, Nimo Gamma, CNS, Keith Naranjo, CNP, Wing Dodson, [facility]    Discharge Summary     Patient ID: Andrews Officer  :  1955   MRN: [id number]     ACCOUNT:  [de-identified]   Patient's PCP: Chilo Tuttle DO  Admit Date: 2023   Discharge Date: 2023   Length of Stay: 1  Code Status:  Full Code  Admitting Physician: Sada Mauricio MD  Discharge Physician: Sada Mauricio MD     Active Discharge Diagnoses:     Abdominal pain/strangulated of the abdominal hernia, was reduced by ED team, improving and needs outpatient follow-up for elective hernia repair   hypokalemia resolved  COPD   Obstructive sleep apnea on CPAP  Diabetes   Hypertension       Admission Condition:  fair     Discharged Condition: good    Hospital Stay:     Hospital Course:   82-year-old female with past medical history of COPD on 2 L nasal cannula, obstructive sleep apnea on CPAP, diabetes and hypertension, history of abdominal wall hernia with multiple surgeries done in the past who presented

## 2023-11-22 ENCOUNTER — TELEPHONE (OUTPATIENT)
Dept: FAMILY MEDICINE CLINIC | Age: 68
End: 2023-11-22

## 2023-11-22 NOTE — TELEPHONE ENCOUNTER
11/22/23- Spoke to patient and gave her Dr. Miranda Ramos recommendations. All questions answered and patient verbalized understanding.

## 2023-11-22 NOTE — TELEPHONE ENCOUNTER
Patient called in stating she has some questions and concerns since she was in the hospital for hernia. Patient does have follow up scheduled with Dr. Amanda Arora for Monday November 27th to discuss hernia. Patient would like to know:    Can I go walking and exercise? Is she suppose to rest?    Is it important to have a bowel movement? And if so, how often? Can she eat raw vegetables or is she suppose to eat soft foods?

## 2023-11-27 ENCOUNTER — OFFICE VISIT (OUTPATIENT)
Dept: SURGERY | Age: 68
End: 2023-11-27
Payer: MEDICARE

## 2023-11-27 VITALS — WEIGHT: 209 LBS | RESPIRATION RATE: 16 BRPM | HEIGHT: 60 IN | BODY MASS INDEX: 41.03 KG/M2

## 2023-11-27 DIAGNOSIS — K43.6 INCARCERATED VENTRAL HERNIA: Primary | ICD-10-CM

## 2023-11-27 PROCEDURE — 1123F ACP DISCUSS/DSCN MKR DOCD: CPT | Performed by: SURGERY

## 2023-11-27 PROCEDURE — 99203 OFFICE O/P NEW LOW 30 MIN: CPT | Performed by: SURGERY

## 2023-11-27 PROCEDURE — 4004F PT TOBACCO SCREEN RCVD TLK: CPT | Performed by: SURGERY

## 2023-11-27 PROCEDURE — G8484 FLU IMMUNIZE NO ADMIN: HCPCS | Performed by: SURGERY

## 2023-11-27 PROCEDURE — G8417 CALC BMI ABV UP PARAM F/U: HCPCS | Performed by: SURGERY

## 2023-11-27 PROCEDURE — G8427 DOCREV CUR MEDS BY ELIG CLIN: HCPCS | Performed by: SURGERY

## 2023-11-27 PROCEDURE — G8399 PT W/DXA RESULTS DOCUMENT: HCPCS | Performed by: SURGERY

## 2023-11-27 PROCEDURE — 3017F COLORECTAL CA SCREEN DOC REV: CPT | Performed by: SURGERY

## 2023-11-27 PROCEDURE — 1090F PRES/ABSN URINE INCON ASSESS: CPT | Performed by: SURGERY

## 2023-11-27 PROCEDURE — 1111F DSCHRG MED/CURRENT MED MERGE: CPT | Performed by: SURGERY

## 2023-11-28 NOTE — PROGRESS NOTES
[facility]  Clinic Evaluation  Nae RuizeyDO    Pt Name: Mario George  MRN: [id number]  YOB: 1955  Date of evaluation: 2023  Primary Care Physician: Amrit Velez DO    Chief Complaint: incarcerated ventral hernia      SUBJECTIVE:    History of Present Illness: This is a 79 y.o.  female who presents for evaluation for the above, pt was seen in [facility] ED  for acute exacerbation of chronic ventral/incisional hernia for which she has had at least two prior attempts at repair, pt was ultimately DCed to home without surgical intervention. Pt presents today without new symptoms, she reports being back to her baseline without new issues with regards to her abdominal wall. No other complaints. Chart review performed to add information to the HPI: Yes    Past Medical History   has a past medical history of Asthma, COPD (chronic obstructive pulmonary disease) ([address]), Diabetes mellitus ([address]), and Hyperlipidemia. Past Surgical History   has a past surgical history that includes Hysterectomy;  section; and hernia repair. Family History  family history is not on file. Social History  Tobacco use:  reports that she has quit smoking. She does not have any smokeless tobacco history on file. Alcohol use:  reports no history of alcohol use. Drug use:  reports no history of drug use.       Medications  Current Medications:   Current Outpatient Medications   Medication Sig Dispense Refill    empagliflozin (JARDIANCE) 25 MG tablet Take 1 tablet by mouth daily      losartan (COZAAR) 25 MG tablet Take 1 tablet by mouth daily      furosemide (LASIX) 20 MG tablet Take 0.5 tablets by mouth daily      albuterol (PROVENTIL) (2.5 MG/3ML) 0.083% nebulizer solution Take 3 mLs by nebulization every 6 hours as needed for Wheezing      aspirin 81 MG tablet Take 1 tablet by mouth daily      Omega-3 Fatty Acids (FISH OIL) 1000 MG capsule Take by mouth 2 times daily      metFORMIN

## 2024-01-07 ENCOUNTER — HOSPITAL ENCOUNTER (OUTPATIENT)
Age: 69
Discharge: HOME | End: 2024-01-07
Payer: MEDICARE

## 2024-01-07 PROCEDURE — 93005 ELECTROCARDIOGRAM TRACING: CPT | Performed by: SURGERY

## 2024-01-08 LAB
EKG ATRIAL RATE: 77 BPM
EKG P AXIS: 57 DEGREES
EKG P-R INTERVAL: 116 MS
EKG Q-T INTERVAL: 374 MS
EKG QRS DURATION: 94 MS
EKG QTC CALCULATION (BAZETT): 423 MS
EKG R AXIS: 0 DEGREES
EKG T AXIS: 66 DEGREES
EKG VENTRICULAR RATE: 77 BPM

## 2024-01-08 PROCEDURE — 93010 ELECTROCARDIOGRAM REPORT: CPT | Performed by: INTERNAL MEDICINE

## 2024-01-09 ENCOUNTER — TELEPHONE (OUTPATIENT)
Dept: SURGERY | Age: 69
End: 2024-01-09

## 2024-01-09 NOTE — TELEPHONE ENCOUNTER
1/9/24- Returned patients call. Patient inquiring about pulmonologist clearance. Writer explain that per Dr. Crawford office, he will review and writer will contact his office tomorrow for an update.

## 2024-01-26 ENCOUNTER — HOSPITAL ENCOUNTER (OUTPATIENT)
Age: 69
Discharge: HOME | End: 2024-01-26
Payer: MEDICARE

## 2024-01-26 LAB
25(OH)D3 SERPL-MCNC: 36.1 NG/ML (ref 30–100)
ALBUMIN SERPL-MCNC: 4.5 G/DL (ref 3.5–5.2)
ALBUMIN/GLOB SERPL: 2 {RATIO} (ref 1–2.5)
ALP SERPL-CCNC: 71 U/L (ref 35–104)
ALT SERPL-CCNC: 15 U/L (ref 10–35)
ANION GAP SERPL CALCULATED.3IONS-SCNC: 14 MMOL/L (ref 9–16)
AST SERPL-CCNC: 26 U/L (ref 10–35)
BILIRUB SERPL-MCNC: 0.8 MG/DL (ref 0–1.2)
BUN SERPL-MCNC: 13 MG/DL (ref 8–23)
CA-I BLD-SCNC: 1.35 MMOL/L (ref 1.13–1.33)
CALCIUM SERPL-MCNC: 10.6 MG/DL (ref 8.6–10.4)
CHLORIDE SERPL-SCNC: 103 MMOL/L (ref 98–107)
CHOLEST SERPL-MCNC: 288 MG/DL (ref 0–199)
CHOLESTEROL/HDL RATIO: 5
CO2 SERPL-SCNC: 25 MMOL/L (ref 20–31)
CREAT SERPL-MCNC: 0.7 MG/DL (ref 0.5–0.9)
CREAT UR-MCNC: 49.2 MG/DL (ref 28–217)
EST. AVERAGE GLUCOSE BLD GHB EST-MCNC: 143 MG/DL
GFR SERPL CREATININE-BSD FRML MDRD: >60 ML/MIN/1.73M2
GLUCOSE P FAST SERPL-MCNC: 132 MG/DL (ref 74–99)
HBA1C MFR BLD: 6.6 % (ref 4–6)
HDLC SERPL-MCNC: 53 MG/DL
LDLC SERPL CALC-MCNC: 192 MG/DL (ref 0–100)
MICROALBUMIN UR-MCNC: 18 MG/L (ref 0–20)
MICROALBUMIN/CREAT UR-RTO: 37 MCG/MG CREAT (ref 0–25)
PHOSPHATE SERPL-MCNC: 3 MG/DL (ref 2.5–4.5)
POTASSIUM SERPL-SCNC: 4 MMOL/L (ref 3.7–5.3)
PROT SERPL-MCNC: 7.3 G/DL (ref 6.6–8.7)
PTH-INTACT SERPL-MCNC: 66.1 PG/ML (ref 14–72)
SODIUM SERPL-SCNC: 142 MMOL/L (ref 136–145)
TRIGL SERPL-MCNC: 217 MG/DL (ref 0–149)
VLDLC SERPL CALC-MCNC: 43 MG/DL

## 2024-01-26 PROCEDURE — 80053 COMPREHEN METABOLIC PANEL: CPT

## 2024-01-26 PROCEDURE — 82330 ASSAY OF CALCIUM: CPT

## 2024-01-26 PROCEDURE — 83036 HEMOGLOBIN GLYCOSYLATED A1C: CPT

## 2024-01-26 PROCEDURE — 36415 COLL VENOUS BLD VENIPUNCTURE: CPT

## 2024-01-26 PROCEDURE — 80061 LIPID PANEL: CPT

## 2024-01-26 PROCEDURE — 82306 VITAMIN D 25 HYDROXY: CPT

## 2024-01-26 PROCEDURE — 83970 ASSAY OF PARATHORMONE: CPT

## 2024-01-26 PROCEDURE — 84100 ASSAY OF PHOSPHORUS: CPT

## 2024-01-26 PROCEDURE — 82043 UR ALBUMIN QUANTITATIVE: CPT

## 2024-01-26 PROCEDURE — 82570 ASSAY OF URINE CREATININE: CPT

## 2024-02-01 RX ORDER — MULTIVIT-MIN/IRON/FOLIC ACID/K 18-600-40
1 CAPSULE ORAL DAILY
COMMUNITY

## 2024-02-02 ENCOUNTER — HOSPITAL ENCOUNTER (OUTPATIENT)
Age: 69
Setting detail: OUTPATIENT SURGERY
Discharge: HOME | End: 2024-02-02
Attending: SURGERY | Admitting: SURGERY
Payer: MEDICARE

## 2024-02-02 ENCOUNTER — ANESTHESIA (OUTPATIENT)
Dept: OPERATING ROOM | Age: 69
End: 2024-02-02
Payer: MEDICARE

## 2024-02-02 ENCOUNTER — ANESTHESIA EVENT (OUTPATIENT)
Dept: OPERATING ROOM | Age: 69
End: 2024-02-02
Payer: MEDICARE

## 2024-02-02 VITALS
WEIGHT: 216 LBS | OXYGEN SATURATION: 91 % | TEMPERATURE: 99.2 F | HEIGHT: 60 IN | BODY MASS INDEX: 42.41 KG/M2 | HEART RATE: 80 BPM | RESPIRATION RATE: 12 BRPM | SYSTOLIC BLOOD PRESSURE: 145 MMHG | DIASTOLIC BLOOD PRESSURE: 65 MMHG

## 2024-02-02 DIAGNOSIS — K42.0 RECURRENT UMBILICAL HERNIA WITH INCARCERATION: Primary | ICD-10-CM

## 2024-02-02 DIAGNOSIS — K43.0 INCARCERATED INCISIONAL HERNIA: ICD-10-CM

## 2024-02-02 PROBLEM — K42.9 RECURRENT UMBILICAL HERNIA: Status: ACTIVE | Noted: 2024-02-02

## 2024-02-02 LAB
GLUCOSE BLD-MCNC: 144 MG/DL (ref 74–100)
GLUCOSE BLD-MCNC: 182 MG/DL (ref 65–105)
POTASSIUM BLD-SCNC: 3.5 MMOL/L (ref 3.5–4.5)

## 2024-02-02 PROCEDURE — 2580000003 HC RX 258: Performed by: ANESTHESIOLOGY

## 2024-02-02 PROCEDURE — C1781 MESH (IMPLANTABLE): HCPCS | Performed by: SURGERY

## 2024-02-02 PROCEDURE — 3600000009 HC SURGERY ROBOT BASE: Performed by: SURGERY

## 2024-02-02 PROCEDURE — 6360000002 HC RX W HCPCS: Performed by: ANESTHESIOLOGY

## 2024-02-02 PROCEDURE — 2709999900 HC NON-CHARGEABLE SUPPLY: Performed by: SURGERY

## 2024-02-02 PROCEDURE — 2500000003 HC RX 250 WO HCPCS

## 2024-02-02 PROCEDURE — 3700000000 HC ANESTHESIA ATTENDED CARE: Performed by: SURGERY

## 2024-02-02 PROCEDURE — 6360000002 HC RX W HCPCS: Performed by: SURGERY

## 2024-02-02 PROCEDURE — 6370000000 HC RX 637 (ALT 250 FOR IP): Performed by: ANESTHESIOLOGY

## 2024-02-02 PROCEDURE — S2900 ROBOTIC SURGICAL SYSTEM: HCPCS | Performed by: SURGERY

## 2024-02-02 PROCEDURE — 3700000001 HC ADD 15 MINUTES (ANESTHESIA): Performed by: SURGERY

## 2024-02-02 PROCEDURE — 7100000001 HC PACU RECOVERY - ADDTL 15 MIN: Performed by: SURGERY

## 2024-02-02 PROCEDURE — 3600000019 HC SURGERY ROBOT ADDTL 15MIN: Performed by: SURGERY

## 2024-02-02 PROCEDURE — 6360000002 HC RX W HCPCS

## 2024-02-02 PROCEDURE — 7100000011 HC PHASE II RECOVERY - ADDTL 15 MIN: Performed by: SURGERY

## 2024-02-02 PROCEDURE — 2500000003 HC RX 250 WO HCPCS: Performed by: SURGERY

## 2024-02-02 PROCEDURE — 82947 ASSAY GLUCOSE BLOOD QUANT: CPT

## 2024-02-02 PROCEDURE — 2580000003 HC RX 258: Performed by: SURGERY

## 2024-02-02 PROCEDURE — 7100000010 HC PHASE II RECOVERY - FIRST 15 MIN: Performed by: SURGERY

## 2024-02-02 PROCEDURE — 84132 ASSAY OF SERUM POTASSIUM: CPT

## 2024-02-02 PROCEDURE — 7100000000 HC PACU RECOVERY - FIRST 15 MIN: Performed by: SURGERY

## 2024-02-02 DEVICE — PHASIX ST MESH, 10 CM X 15 CM (4" X 6"), RECTANGLE
Type: IMPLANTABLE DEVICE | Site: ABDOMEN | Status: FUNCTIONAL
Brand: PHASIX

## 2024-02-02 RX ORDER — FENTANYL CITRATE 50 UG/ML
50 INJECTION, SOLUTION INTRAMUSCULAR; INTRAVENOUS
Status: DISCONTINUED | OUTPATIENT
Start: 2024-02-02 | End: 2024-02-02 | Stop reason: HOSPADM

## 2024-02-02 RX ORDER — FENTANYL CITRATE 50 UG/ML
INJECTION, SOLUTION INTRAMUSCULAR; INTRAVENOUS PRN
Status: DISCONTINUED | OUTPATIENT
Start: 2024-02-02 | End: 2024-02-02 | Stop reason: SDUPTHER

## 2024-02-02 RX ORDER — DOCUSATE SODIUM 100 MG/1
100 CAPSULE, LIQUID FILLED ORAL 2 TIMES DAILY
Qty: 20 CAPSULE | Refills: 0 | Status: SHIPPED | OUTPATIENT
Start: 2024-02-02

## 2024-02-02 RX ORDER — SODIUM CHLORIDE 9 MG/ML
INJECTION, SOLUTION INTRAVENOUS PRN
Status: DISCONTINUED | OUTPATIENT
Start: 2024-02-02 | End: 2024-02-02 | Stop reason: HOSPADM

## 2024-02-02 RX ORDER — HYDROCODONE BITARTRATE AND ACETAMINOPHEN 5; 325 MG/1; MG/1
1 TABLET ORAL EVERY 6 HOURS PRN
Qty: 15 TABLET | Refills: 0 | Status: SHIPPED | OUTPATIENT
Start: 2024-02-02 | End: 2024-02-09

## 2024-02-02 RX ORDER — DEXAMETHASONE SODIUM PHOSPHATE 10 MG/ML
INJECTION INTRAMUSCULAR; INTRAVENOUS PRN
Status: DISCONTINUED | OUTPATIENT
Start: 2024-02-02 | End: 2024-02-02

## 2024-02-02 RX ORDER — MIDAZOLAM HYDROCHLORIDE 1 MG/ML
INJECTION INTRAMUSCULAR; INTRAVENOUS PRN
Status: DISCONTINUED | OUTPATIENT
Start: 2024-02-02 | End: 2024-02-02 | Stop reason: SDUPTHER

## 2024-02-02 RX ORDER — MAGNESIUM HYDROXIDE 1200 MG/15ML
LIQUID ORAL CONTINUOUS PRN
Status: DISCONTINUED | OUTPATIENT
Start: 2024-02-02 | End: 2024-02-02 | Stop reason: HOSPADM

## 2024-02-02 RX ORDER — LIDOCAINE HYDROCHLORIDE 10 MG/ML
INJECTION, SOLUTION INFILTRATION; PERINEURAL PRN
Status: DISCONTINUED | OUTPATIENT
Start: 2024-02-02 | End: 2024-02-02 | Stop reason: HOSPADM

## 2024-02-02 RX ORDER — MIDAZOLAM HYDROCHLORIDE 2 MG/2ML
1 INJECTION, SOLUTION INTRAMUSCULAR; INTRAVENOUS EVERY 10 MIN PRN
Status: DISCONTINUED | OUTPATIENT
Start: 2024-02-02 | End: 2024-02-02 | Stop reason: HOSPADM

## 2024-02-02 RX ORDER — FENTANYL CITRATE 50 UG/ML
25 INJECTION, SOLUTION INTRAMUSCULAR; INTRAVENOUS
Status: DISCONTINUED | OUTPATIENT
Start: 2024-02-02 | End: 2024-02-02 | Stop reason: HOSPADM

## 2024-02-02 RX ORDER — CYCLOBENZAPRINE HCL 5 MG
5 TABLET ORAL 3 TIMES DAILY PRN
Qty: 15 TABLET | Refills: 0 | Status: SHIPPED | OUTPATIENT
Start: 2024-02-02 | End: 2024-02-07

## 2024-02-02 RX ORDER — SODIUM CHLORIDE 0.9 % (FLUSH) 0.9 %
5-40 SYRINGE (ML) INJECTION EVERY 12 HOURS SCHEDULED
Status: DISCONTINUED | OUTPATIENT
Start: 2024-02-02 | End: 2024-02-02 | Stop reason: HOSPADM

## 2024-02-02 RX ORDER — LIDOCAINE HYDROCHLORIDE 10 MG/ML
INJECTION, SOLUTION EPIDURAL; INFILTRATION; INTRACAUDAL; PERINEURAL PRN
Status: DISCONTINUED | OUTPATIENT
Start: 2024-02-02 | End: 2024-02-02 | Stop reason: SDUPTHER

## 2024-02-02 RX ORDER — HYDROCODONE BITARTRATE AND ACETAMINOPHEN 5; 325 MG/1; MG/1
1 TABLET ORAL
Status: COMPLETED | OUTPATIENT
Start: 2024-02-02 | End: 2024-02-02

## 2024-02-02 RX ORDER — PROPOFOL 10 MG/ML
INJECTION, EMULSION INTRAVENOUS PRN
Status: DISCONTINUED | OUTPATIENT
Start: 2024-02-02 | End: 2024-02-02 | Stop reason: SDUPTHER

## 2024-02-02 RX ORDER — DIPHENHYDRAMINE HYDROCHLORIDE 50 MG/ML
12.5 INJECTION INTRAMUSCULAR; INTRAVENOUS
Status: DISCONTINUED | OUTPATIENT
Start: 2024-02-02 | End: 2024-02-02 | Stop reason: HOSPADM

## 2024-02-02 RX ORDER — SODIUM CHLORIDE, SODIUM LACTATE, POTASSIUM CHLORIDE, CALCIUM CHLORIDE 600; 310; 30; 20 MG/100ML; MG/100ML; MG/100ML; MG/100ML
INJECTION, SOLUTION INTRAVENOUS CONTINUOUS
Status: DISCONTINUED | OUTPATIENT
Start: 2024-02-02 | End: 2024-02-02 | Stop reason: HOSPADM

## 2024-02-02 RX ORDER — ONDANSETRON 2 MG/ML
4 INJECTION INTRAMUSCULAR; INTRAVENOUS
Status: DISCONTINUED | OUTPATIENT
Start: 2024-02-02 | End: 2024-02-02 | Stop reason: HOSPADM

## 2024-02-02 RX ORDER — ONDANSETRON 2 MG/ML
INJECTION INTRAMUSCULAR; INTRAVENOUS PRN
Status: DISCONTINUED | OUTPATIENT
Start: 2024-02-02 | End: 2024-02-02 | Stop reason: SDUPTHER

## 2024-02-02 RX ORDER — SODIUM CHLORIDE 0.9 % (FLUSH) 0.9 %
5-40 SYRINGE (ML) INJECTION PRN
Status: DISCONTINUED | OUTPATIENT
Start: 2024-02-02 | End: 2024-02-02 | Stop reason: HOSPADM

## 2024-02-02 RX ORDER — FENTANYL CITRATE 50 UG/ML
25 INJECTION, SOLUTION INTRAMUSCULAR; INTRAVENOUS EVERY 5 MIN PRN
Status: DISCONTINUED | OUTPATIENT
Start: 2024-02-02 | End: 2024-02-02 | Stop reason: HOSPADM

## 2024-02-02 RX ORDER — ROCURONIUM BROMIDE 10 MG/ML
INJECTION, SOLUTION INTRAVENOUS PRN
Status: DISCONTINUED | OUTPATIENT
Start: 2024-02-02 | End: 2024-02-02 | Stop reason: SDUPTHER

## 2024-02-02 RX ORDER — FENTANYL CITRATE 50 UG/ML
50 INJECTION, SOLUTION INTRAMUSCULAR; INTRAVENOUS EVERY 5 MIN PRN
Status: DISCONTINUED | OUTPATIENT
Start: 2024-02-02 | End: 2024-02-02 | Stop reason: HOSPADM

## 2024-02-02 RX ORDER — ALBUTEROL SULFATE 2.5 MG/3ML
2.5 SOLUTION RESPIRATORY (INHALATION) EVERY 8 HOURS PRN
Status: DISCONTINUED | OUTPATIENT
Start: 2024-02-02 | End: 2024-02-02 | Stop reason: HOSPADM

## 2024-02-02 RX ORDER — DEXAMETHASONE SODIUM PHOSPHATE 10 MG/ML
INJECTION INTRAMUSCULAR; INTRAVENOUS PRN
Status: DISCONTINUED | OUTPATIENT
Start: 2024-02-02 | End: 2024-02-02 | Stop reason: SDUPTHER

## 2024-02-02 RX ORDER — ALBUTEROL SULFATE 90 UG/1
2 AEROSOL, METERED RESPIRATORY (INHALATION) EVERY 6 HOURS PRN
Status: DISCONTINUED | OUTPATIENT
Start: 2024-02-02 | End: 2024-02-02 | Stop reason: HOSPADM

## 2024-02-02 RX ORDER — BUPIVACAINE HYDROCHLORIDE 2.5 MG/ML
INJECTION, SOLUTION INFILTRATION; PERINEURAL PRN
Status: DISCONTINUED | OUTPATIENT
Start: 2024-02-02 | End: 2024-02-02 | Stop reason: HOSPADM

## 2024-02-02 RX ORDER — SCOLOPAMINE TRANSDERMAL SYSTEM 1 MG/1
1 PATCH, EXTENDED RELEASE TRANSDERMAL ONCE
Status: DISCONTINUED | OUTPATIENT
Start: 2024-02-02 | End: 2024-02-02 | Stop reason: HOSPADM

## 2024-02-02 RX ADMIN — FENTANYL CITRATE 50 MCG: 50 INJECTION, SOLUTION INTRAMUSCULAR; INTRAVENOUS at 17:07

## 2024-02-02 RX ADMIN — MIDAZOLAM HYDROCHLORIDE 2 MG: 1 INJECTION INTRAMUSCULAR; INTRAVENOUS at 13:03

## 2024-02-02 RX ADMIN — FENTANYL CITRATE 50 MCG: 50 INJECTION, SOLUTION INTRAMUSCULAR; INTRAVENOUS at 17:33

## 2024-02-02 RX ADMIN — ONDANSETRON 4 MG: 2 INJECTION INTRAMUSCULAR; INTRAVENOUS at 16:09

## 2024-02-02 RX ADMIN — ROCURONIUM BROMIDE 50 MG: 10 INJECTION, SOLUTION INTRAVENOUS at 13:07

## 2024-02-02 RX ADMIN — DEXAMETHASONE SODIUM PHOSPHATE 10 MG: 10 INJECTION INTRAMUSCULAR; INTRAVENOUS at 13:24

## 2024-02-02 RX ADMIN — SODIUM CHLORIDE, POTASSIUM CHLORIDE, SODIUM LACTATE AND CALCIUM CHLORIDE: 600; 310; 30; 20 INJECTION, SOLUTION INTRAVENOUS at 11:51

## 2024-02-02 RX ADMIN — FENTANYL CITRATE 50 MCG: 50 INJECTION, SOLUTION INTRAMUSCULAR; INTRAVENOUS at 13:36

## 2024-02-02 RX ADMIN — FENTANYL CITRATE 50 MCG: 50 INJECTION, SOLUTION INTRAMUSCULAR; INTRAVENOUS at 13:23

## 2024-02-02 RX ADMIN — FENTANYL CITRATE 50 MCG: 50 INJECTION, SOLUTION INTRAMUSCULAR; INTRAVENOUS at 15:10

## 2024-02-02 RX ADMIN — PROPOFOL 150 MG: 10 INJECTION, EMULSION INTRAVENOUS at 13:07

## 2024-02-02 RX ADMIN — HYDROCODONE BITARTRATE AND ACETAMINOPHEN 1 TABLET: 5; 325 TABLET ORAL at 18:02

## 2024-02-02 RX ADMIN — FENTANYL CITRATE 50 MCG: 50 INJECTION, SOLUTION INTRAMUSCULAR; INTRAVENOUS at 13:07

## 2024-02-02 RX ADMIN — LIDOCAINE HYDROCHLORIDE 50 MG: 10 INJECTION, SOLUTION EPIDURAL; INFILTRATION; INTRACAUDAL; PERINEURAL at 13:07

## 2024-02-02 RX ADMIN — SODIUM CHLORIDE, POTASSIUM CHLORIDE, SODIUM LACTATE AND CALCIUM CHLORIDE: 600; 310; 30; 20 INJECTION, SOLUTION INTRAVENOUS at 14:27

## 2024-02-02 ASSESSMENT — PAIN SCALES - GENERAL
PAINLEVEL_OUTOF10: 7
PAINLEVEL_OUTOF10: 7
PAINLEVEL_OUTOF10: 6

## 2024-02-02 ASSESSMENT — PAIN DESCRIPTION - DESCRIPTORS: DESCRIPTORS: SORE

## 2024-02-02 ASSESSMENT — PAIN DESCRIPTION - PAIN TYPE
TYPE: SURGICAL PAIN
TYPE: SURGICAL PAIN

## 2024-02-02 ASSESSMENT — PAIN SCALES - WONG BAKER
WONGBAKER_NUMERICALRESPONSE: 0
WONGBAKER_NUMERICALRESPONSE: 0

## 2024-02-02 ASSESSMENT — PAIN - FUNCTIONAL ASSESSMENT: PAIN_FUNCTIONAL_ASSESSMENT: NONE - DENIES PAIN

## 2024-02-02 ASSESSMENT — PAIN DESCRIPTION - ORIENTATION: ORIENTATION: LEFT

## 2024-02-02 ASSESSMENT — PAIN DESCRIPTION - LOCATION: LOCATION: ABDOMEN

## 2024-02-02 NOTE — H&P
[facility] General Surgery  [facility]      Patient's Name/ Date of Birth/ Gender: Irais Max / 1955 (68 y.o.) / female     Attending physician: Christo Purdy DO    CC: recurrent ventral hernia    History of present Illness: Irais Max is a 68 y.o. female, presents for hernia repair.     Past Medical History:  has a past medical history of Anxiety, Asthma, COPD (chronic obstructive pulmonary disease) ([facility]), COVID-19 vaccine series completed, Diabetes mellitus ([facility]), Full dentures, Goiter, Hyperlipidemia, Hyperparathyroidism ([facility]), Hypertension, Lives alone, Obesity, Oxygen dependent, Recurrent ventral hernia, Sleep apnea, Under care of team, Under care of team, Wears glasses, and Wellness examination.    Past Surgical History:   Past Surgical History:   Procedure Laterality Date     SECTION  1976    HYSTERECTOMY (CERVIX STATUS UNKNOWN)      with BSO    TUBAL LIGATION      with c section    VENTRAL HERNIA REPAIR      x 2       Social History:  reports that she quit smoking about 9 years ago. Her smoking use included cigarettes. She started smoking about 50 years ago. She has a 61.5 pack-year smoking history. She has never used smokeless tobacco. She reports that she does not drink alcohol and does not use drugs.    Family History: family history includes No Known Problems in her sister. She was adopted.    Review of Systems:   General: Denies fever, chills, night sweats, weight loss, malaise, fatigue  HEENT: Denies sore throat, sinus problems, allergic rhinosinusitis  Card: Denies chest pain, palpitations, orthopnea/PND. Denies h/o murmurs  Pulm: Denies cough, shortness of breath, FRY  GI:   Denies history of constipation, diarrhea, hematochezia or melena  : Denies polyuria, dysuria, hematuria  Endo: Denies diabetes, thyroid problems.  Heme: Denies anemia, h/o bleeding or clotting problems.   Neuro: Denies h/o CVA, TIA  Skin: Denies rashes,

## 2024-02-02 NOTE — OP NOTE
Operative Note      Patient: Irais Max  YOB: 1955  MRN: [id number]    Date of Procedure: 2/2/2024    Pre-Op Diagnosis Codes:     * Ventral hernia without obstruction or gangrene [K43.9]    Post-Op Diagnosis:   Failure of old intraperitoneal mesh (likely [place]-Bjorn) with incarcerated omentum in the umbilical defect, 3cm in size  Incarcerated omentum within incisional ventral hernia just inferior to the umbilicus with adhesion to prior synthetic onlay mesh overall 4cm in size  Two additional <1cm defects inferior to the incisional defects  All defects within 10cm of each other       Procedure(s):  Robot-assisted laparoscopic umbilical and incisional hernia repair with Phasix ST mesh  Lysis of adhesions  Explant of old mesh  Oversew repair of thermal injury to transverse colon    Surgeon(s):  Christo Purdy DO    Assistant:   Resident: Marizol Meclhor MD    Anesthesia: General    Estimated Blood Loss (mL): Minimal    Complications: None    Specimens:   * No specimens in log *    Implants:  Implant Name Type Inv. Item Serial No.  Lot No. LRB No. Used Action   MESH ALISA [id number] MFIL RESRB RECT W/ HYDRGEL SAMANIEGO SCFLD - [id number]  MESH ALISA [id number] MFIL RESRB RECT W/ HYDRGEL SAMANIEGO SCFLD  BARD DAVOL-WD [id number] N/A 1 Implanted         Drains:   [REMOVED] Urinary Catheter 02/02/24 2 Way (Removed)       Findings: as above wound class 1        Detailed Description of Procedure:       HISTORY: The patient is a 68 y.o. year old female with history of above preop diagnosis.  The risk, benefits, expected outcome, and alternatives to the procedure were explained to the patient's understanding and written informed consent was obtained.         DESCRIPTION OF PROCEDURE:  Patient was brought to the operating suite and placed on the operating table in supine position. Timeout was performed verifying correct patient, position, equipment and procedure to be performed. EPC cuffs were applied and preoperative  antibiotics were infused. General anesthesia was administered and the patient was endotracheally intubated. The patient's abdomen was prepped and draped in the usual sterile fashion. #15 scalpel was used to make a transverse incision 1cm at De Leon's point. Veress needle was used to create a pneumoperitoneum to a pressure of 15mmHg. 8mm Optiview trocar was used to gain entry into the abdomen, no injury to the underlying structures were seen. Remaining 8mm ports were placed under direct visualization left of the umbilicus and in the LLQ. Robot was docked without complication.     A 30 degree scope was placed into the abdomen.     Procedure was begun by freeing adhesed omentum to the anterior abdominal wall, this allowed visualization of the two larger abdominal wall defects.  The umbilical defect was found to have an intraperitoneal mesh later examined and likely [place]-Bjorn.  This was excised from the abdominal wall including the incarcerated omentum, examination of the umbilical hernia shows onlay synthetic mesh in the subcutaneous space.     Attention was then turned to the incisional hernia, larger bolus of omentum incarcerated within this defect and found to be adherent to the onlay mesh overlying the umbilical defect, this was dissected away and returned to the peritoneal cavity.  The patient has no true preperitoneal space and CT imaging shows an extremely thin abdominal wall therefore intraperitoneal onlay technique was chosen.     The abdominal wall was cleared of overlying tissues several centimeters above and below the hernia defects.  0 STRATAFIX PDS suture x 2 was used to close not only the hernia defects but also relative diastasis of the linea alba in 2 layers. 46u41cl Phasix mesh was placed as an onlay and circumferentially sutured in place with 3-0 STRATAFIX PDS sutures.  The body of the mesh was tacked in several places with interrupted sutures of 3-0 Vicryl to assist with incorporation.    The

## 2024-02-02 NOTE — ANESTHESIA PRE PROCEDURE
Department of Anesthesiology  Preprocedure Note       Name:  Irais Max   Age:  68 y.o.  :  1955                                          MRN:  [id number]         Date:  2024      Surgeon: Surgeon(s):  Christo Purdy DO    Procedure: Procedure(s):  XI ROBOTIC LAPAROSCOPIC VENTRAL HERNIA REPAIR WITH MESH, EXPLANTATION OF OLD MESH, POSSIBLE OPEN    Medications prior to admission:   Prior to Admission medications    Medication Sig Start Date End Date Taking? Authorizing Provider   Cholecalciferol (VITAMIN D) 50 MCG (2000) CAPS capsule Take 1 capsule by mouth daily   Yes Henok Oliveira MD   ALBUTEROL SULFATE HFA IN Inhale 1 puff into the lungs every 4 hours as needed As needed    Henok Oliveira MD   Ascorbic Acid (VITAMIN C) 500 MG CAPS Take 1 capsule by mouth daily    Henok Oliveira MD   Budeson-Glycopyrrol-Formoterol (BREZTRI AEROSPHERE) 160-9-4.8 MCG/ACT AERO Inhale 2 puffs into the lungs 2 times daily 10/31/23   Henok Oliveira MD   OXYGEN 2L/ NC for shortness of breath    Henok Oliveira MD   empagliflozin (JARDIANCE) 25 MG tablet Take 1 tablet by mouth daily    Henok Oliveira MD   Omega-3 Fatty Acids (FISH OIL) 1000 MG capsule Take by mouth 2 times daily    Henok Oliveira MD   losartan (COZAAR) 25 MG tablet Take 1 tablet by mouth at bedtime    Henok Oliveira MD   furosemide (LASIX) 20 MG tablet Take 0.5 tablets by mouth daily    Henok Oliveira MD   albuterol (PROVENTIL) (2.5 MG/3ML) 0.083% nebulizer solution Take 3 mLs by nebulization every 6 hours as needed for Wheezing    Henok Oliveira MD   aspirin 81 MG tablet Take 1 tablet by mouth at bedtime    Henok Oliveira MD       Current medications:    No current facility-administered medications for this encounter.       Allergies:    Allergies   Allergen Reactions    Levofloxacin Diarrhea    Lisinopril Cough       Problem List:    Patient Active Problem List   Diagnosis Code

## 2024-02-02 NOTE — DISCHARGE INSTRUCTIONS
Patient Discharge Instructions  Discharge Date:  2/2/2024    HYGEINE: Ok to shower 02/03/24, no soaking in a tub/pool until seen at your follow up appointment. Clean incision daily with gentle soap and water, do not use alcohol/peroxide or any harsh cleansers.    DRIVING: No driving while taking narcotic medications or while in pain    ACTIVITY: No lifting greater than 20lbs for 6 weeks or any strenuous activity.      DIET: Resume your normal diet as advised by your PCP.    MEDICATIONS: Take all medications as prescribed. Take Colace until you have your first bowel movement, continue to take if you are using narcotics for pain control    SPECIAL INSTRUCTIONS:     Follow up with Dr. Purdy in 10-14 days, call the clinic for appointment. Call sooner if fever above 100.4 degrees Farenheit, increase in swelling or redness, thick purulent discharge or pain not controlled with medications.

## 2024-02-05 NOTE — ANESTHESIA POSTPROCEDURE EVALUATION
Department of Anesthesiology  Postprocedure Note    Patient: Irais Max  MRN: [id number]  YOB: 1955  Date of evaluation: 2/5/2024    Procedure Summary       Date: 02/02/24 Room / Location: [address]    Anesthesia Start: 1302 Anesthesia Stop: 1632    Procedure: XI ROBOTIC LAPAROSCOPIC VENTRAL HERNIA REPAIR WITH MESH, EXPLANTATION OF OLD MESH, LYSIS OF ADHESIONS Diagnosis:       Ventral hernia without obstruction or gangrene      (Ventral hernia without obstruction or gangrene [K43.9])    Surgeons: Christo Purdy DO Responsible Provider: Mindi Tinajero MD    Anesthesia Type: general ASA Status: 3            Anesthesia Type: No value filed.    Patricio Phase I: Patricio Score: 9    Patricio Phase II: Patricio Score: 9    Anesthesia Post Evaluation    Patient location during evaluation: PACU  Patient participation: complete - patient participated  Level of consciousness: awake  Pain score: 1  Airway patency: patent  Nausea & Vomiting: no nausea and no vomiting  Cardiovascular status: blood pressure returned to baseline and hemodynamically stable  Respiratory status: acceptable  Hydration status: euvolemic  Pain management: adequate    No notable events documented.

## 2024-02-12 ENCOUNTER — OFFICE VISIT (OUTPATIENT)
Dept: SURGERY | Age: 69
End: 2024-02-12

## 2024-02-12 VITALS
HEIGHT: 60 IN | DIASTOLIC BLOOD PRESSURE: 72 MMHG | OXYGEN SATURATION: 100 % | HEART RATE: 101 BPM | WEIGHT: 216 LBS | SYSTOLIC BLOOD PRESSURE: 139 MMHG | RESPIRATION RATE: 16 BRPM | BODY MASS INDEX: 42.41 KG/M2

## 2024-02-12 DIAGNOSIS — K43.6 INCARCERATED VENTRAL HERNIA: Primary | ICD-10-CM

## 2024-02-12 DIAGNOSIS — Z87.19 STATUS POST REPAIR OF VENTRAL HERNIA: ICD-10-CM

## 2024-02-12 DIAGNOSIS — Z98.890 STATUS POST REPAIR OF VENTRAL HERNIA: ICD-10-CM

## 2024-02-12 PROCEDURE — 99024 POSTOP FOLLOW-UP VISIT: CPT | Performed by: SURGERY

## 2024-02-12 NOTE — PROGRESS NOTES
[facility] General Surgery   Clinic Evaluation  Christo Purdy DO    PATIENT NAME: Irais Max     CLINIC VISIT DATE: 2/12/2024    SUBJECTIVE:  Irais Max is a 68 y.o. female who presents to the clinic today for follow up to robot repair of recurrent umbilical and incisional hernia repair performed 2/2/2024, her  is also present.     Patient expected abdominal wall tenderness postsurgery and reports this continues to improve, did have one incidence of reported fever that was responsive to Tylenol, otherwise no new issues since surgery, pt is tolerating regular diet and having normal BM.           OBJECTIVE:    /72   Pulse (!) 101   Resp 16   Ht 1.524 m (5')   Wt 98 kg (216 lb)   SpO2 100%   BMI 42.18 kg/m²     General Appearance:  awake, alert, no acute distress, well developed, well nourished   Skin:  Skin color, texture, turgor normal. No rashes or lesions.  Lungs:  Normal chest expansion, unlabored breathing without accessory muscle use.   No audible rales, rhonchi, or wheezing.  Cardiovascular: S1S2. No evidence of JVD. No evidence of pulsatile masses in abdomen  Abdomen:  Soft, non-tender, no organomegaly, no masses. Incisions C/D/I without evidence of bleeding/infection  Musculoskeletal: No evidence of bony/muscular deformities, trauma, atrophy of either left/right upper/lower extremity. No evidence of digital clubbing or cyanosis.      ASSESSMENT:  1. Incarcerated ventral hernia    2. Status post repair of ventral hernia          PLAN:  We discussed the intraoperative findings as well as rationale for the use of bio resorbable mesh given presence of synthetic mesh within the abdominal wall (onlay) as well as history of what appears to be cortex that was used intra-abdominally.  We discussed the risk of recurrence of abdominal wall hernia within the next 15 years  Lifting restriction to less than 20lbs for the next 4 weeks, unrestricted after this.  F/U prn. All questions were

## 2024-02-26 ENCOUNTER — TELEPHONE (OUTPATIENT)
Dept: FAMILY MEDICINE CLINIC | Age: 69
End: 2024-02-26

## 2024-02-26 ENCOUNTER — PATIENT MESSAGE (OUTPATIENT)
Dept: SURGERY | Age: 69
End: 2024-02-26

## 2024-02-26 NOTE — TELEPHONE ENCOUNTER
From: Diane Nava  To: Dr. Christo Purdy  Sent: 2/26/2024 12:58 PM EST  Subject: CALL TO GAMALIEL ABOUT HERNIA/STOMACH SURGERY    I CALLED   GAMALIEL THIS MORNING TO SEE ID MY HEALING WERE CATHIE GOOD AFTER 3 WEEKS,= THERE IS ABOUT 2 1/2 INCH PINKISH ,= WARM TENDER AREA FROM MY BELLY BUTTON , DOWN WHERE THE OTHER SURGERIES HAS BEEN...AND IV'E LOST MY APPETITE A LITTLE. I ALSO HAVE TROUBLE GETTNG TO THE TOILET ON TIME SOMETMES IN THE MIDDLE OF THE NIGHT TO UNINATE...HOWEVER I AM= ABLE TO SLEEP NOW ON BOTH SIDES...  SHE SAID SHE WOULD CALL ME AFTER SH-E WAS ABLE TO TALK TO YOU..I'M JUST HOPING I'M HEALING AS SHOULD.. THANK YOU..DIANE NAVA

## 2024-02-26 NOTE — TELEPHONE ENCOUNTER
Returned patients call letting her know I haven't heard from Dr. Purdy as he is still in surgery and will call her once I hear back. Writer will contact patient once we hear back from Dr. Purdy and his recommendations.

## 2024-02-28 ENCOUNTER — HOSPITAL ENCOUNTER (OUTPATIENT)
Dept: CT IMAGING | Age: 69
Discharge: HOME | End: 2024-03-01
Attending: SURGERY
Payer: MEDICARE

## 2024-02-28 ENCOUNTER — OFFICE VISIT (OUTPATIENT)
Dept: SURGERY | Age: 69
End: 2024-02-28

## 2024-02-28 VITALS
WEIGHT: 201 LBS | HEART RATE: 118 BPM | BODY MASS INDEX: 39.46 KG/M2 | SYSTOLIC BLOOD PRESSURE: 159 MMHG | DIASTOLIC BLOOD PRESSURE: 87 MMHG | TEMPERATURE: 99.4 F | HEIGHT: 60 IN

## 2024-02-28 DIAGNOSIS — R10.9 ABDOMINAL WALL PAIN: ICD-10-CM

## 2024-02-28 DIAGNOSIS — R10.9 ABDOMINAL WALL PAIN: Primary | ICD-10-CM

## 2024-02-28 LAB
CREAT SERPL-MCNC: 0.5 MG/DL (ref 0.5–0.9)
GFR SERPL CREATININE-BSD FRML MDRD: >60 ML/MIN/1.73M2

## 2024-02-28 PROCEDURE — 99024 POSTOP FOLLOW-UP VISIT: CPT | Performed by: SURGERY

## 2024-02-28 PROCEDURE — 6360000004 HC RX CONTRAST MEDICATION: Performed by: SURGERY

## 2024-02-28 PROCEDURE — 82565 ASSAY OF CREATININE: CPT

## 2024-02-28 PROCEDURE — 74177 CT ABD & PELVIS W/CONTRAST: CPT

## 2024-02-28 PROCEDURE — 2580000003 HC RX 258: Performed by: SURGERY

## 2024-02-28 PROCEDURE — 36415 COLL VENOUS BLD VENIPUNCTURE: CPT

## 2024-02-28 RX ORDER — CLINDAMYCIN HYDROCHLORIDE 300 MG/1
300 CAPSULE ORAL 3 TIMES DAILY
Qty: 30 CAPSULE | Refills: 0 | Status: SHIPPED | OUTPATIENT
Start: 2024-02-28 | End: 2024-03-09

## 2024-02-28 RX ORDER — 0.9 % SODIUM CHLORIDE 0.9 %
80 INTRAVENOUS SOLUTION INTRAVENOUS ONCE
Status: COMPLETED | OUTPATIENT
Start: 2024-02-28 | End: 2024-02-28

## 2024-02-28 RX ORDER — SODIUM CHLORIDE 0.9 % (FLUSH) 0.9 %
10 SYRINGE (ML) INJECTION PRN
Status: DISCONTINUED | OUTPATIENT
Start: 2024-02-28 | End: 2024-03-02 | Stop reason: HOSPADM

## 2024-02-28 RX ADMIN — SODIUM CHLORIDE 80 ML: 9 INJECTION, SOLUTION INTRAVENOUS at 12:11

## 2024-02-28 RX ADMIN — SODIUM CHLORIDE, PRESERVATIVE FREE 10 ML: 5 INJECTION INTRAVENOUS at 12:11

## 2024-02-28 RX ADMIN — IOPAMIDOL 75 ML: 755 INJECTION, SOLUTION INTRAVENOUS at 12:11

## 2024-02-28 NOTE — PROGRESS NOTES
[facility] General Surgery   Clinic Evaluation  Christo Purdy DO    PATIENT NAME: Irais Max     CLINIC VISIT DATE: 2/28/2024    SUBJECTIVE:  Irais Max is a 68 y.o. female who presents to the clinic today for evaluation for ongoing abdominal wall discomfort with intermittent fevers and malaise over the past week, her  is also present. Pt denies sick contact, no other complaints. Robot ventral/incisional hernia repair was done 2/2/2024.      OBJECTIVE:    BP (!) 159/87 (Site: Right Lower Arm, Position: Sitting)   Pulse (!) 118   Temp 99.4 °F (37.4 °C)   Ht 1.524 m (5')   Wt 91.2 kg (201 lb)   BMI 39.26 kg/m²     General Appearance:  awake, alert, no acute distress, well developed, well nourished   Skin:  Skin color, texture, turgor normal. No rashes or lesions.  Lungs:  Normal chest expansion, unlabored breathing without accessory muscle use.   No audible rales, rhonchi, or wheezing.  Cardiovascular: S1S2. No evidence of JVD. No evidence of pulsatile masses in abdomen  Abdomen:  Soft, questionable induration vs soft tissue reaction in the periumbilical region  Musculoskeletal: No evidence of bony/muscular deformities, trauma, atrophy of either left/right upper/lower extremity. No evidence of digital clubbing or cyanosis.      ASSESSMENT:  1. Abdominal wall pain          PLAN:  I sent the patient downstairs for stat CT abd/pelv, I reviewed the preliminary imaging and there is a gas/fluid collection within the abdominal wall above the expected level of the previously placed mesh, I do not see a clear fistulous tract between this collection and the nearby bowel but colo or enteric fistula is not ruled out. At this time pt is not clinically ill appearing but I did speak to the patient directly about these findings and I strongly recommended proceeding to the OR today for exploration of the abdominal wall and possibly laparotomy, pt refuses surgery at this time. She would like to start oral

## 2024-03-05 ENCOUNTER — TELEPHONE (OUTPATIENT)
Dept: SURGERY | Age: 69
End: 2024-03-05

## 2024-03-05 RX ORDER — ACETAMINOPHEN 500 MG
1000 TABLET ORAL EVERY 6 HOURS PRN
COMMUNITY

## 2024-03-05 NOTE — TELEPHONE ENCOUNTER
Spoke to patient, surgery scheduled at [place]. Patient confirmed and information given to patient over the phone.    Surgery date/time: 3/6/24 at 2pm  Arrival time: 12pm  PAT: phone call  Post op: 3/25/24 at 10am

## 2024-03-06 ENCOUNTER — HOSPITAL ENCOUNTER (INPATIENT)
Age: 69
LOS: 10 days | Discharge: NURSING, REHAB OR LONG-TERM CARE | End: 2024-03-16
Attending: SURGERY | Admitting: SURGERY
Payer: MEDICARE

## 2024-03-06 ENCOUNTER — ANESTHESIA (OUTPATIENT)
Dept: OPERATING ROOM | Age: 69
End: 2024-03-06
Payer: MEDICARE

## 2024-03-06 ENCOUNTER — ANESTHESIA EVENT (OUTPATIENT)
Dept: OPERATING ROOM | Age: 69
End: 2024-03-06
Payer: MEDICARE

## 2024-03-06 DIAGNOSIS — I82.409 DVT (DEEP VENOUS THROMBOSIS) (HCC): ICD-10-CM

## 2024-03-06 DIAGNOSIS — I82.421 ACUTE DEEP VEIN THROMBOSIS (DVT) OF ILIAC VEIN OF RIGHT LOWER EXTREMITY (HCC): ICD-10-CM

## 2024-03-06 DIAGNOSIS — K63.2 COLOCUTANEOUS FISTULA: Primary | ICD-10-CM

## 2024-03-06 DIAGNOSIS — R10.9 ABDOMINAL WALL PAIN: ICD-10-CM

## 2024-03-06 DIAGNOSIS — G89.18 POST-OPERATIVE PAIN: ICD-10-CM

## 2024-03-06 LAB
BUN BLD-MCNC: 3 MG/DL (ref 8–26)
EGFR, POC: >60 ML/MIN/1.73M2
GLUCOSE BLD-MCNC: 128 MG/DL (ref 74–100)
POC CREATININE: 0.4 MG/DL (ref 0.51–1.19)
POTASSIUM BLD-SCNC: 3.6 MMOL/L (ref 3.5–4.5)

## 2024-03-06 PROCEDURE — 6360000002 HC RX W HCPCS: Performed by: SURGERY

## 2024-03-06 PROCEDURE — 2500000003 HC RX 250 WO HCPCS: Performed by: SPECIALIST

## 2024-03-06 PROCEDURE — 87186 SC STD MICRODIL/AGAR DIL: CPT

## 2024-03-06 PROCEDURE — 87205 SMEAR GRAM STAIN: CPT

## 2024-03-06 PROCEDURE — 84520 ASSAY OF UREA NITROGEN: CPT

## 2024-03-06 PROCEDURE — 2709999900 HC NON-CHARGEABLE SUPPLY: Performed by: SURGERY

## 2024-03-06 PROCEDURE — 2580000003 HC RX 258: Performed by: SURGERY

## 2024-03-06 PROCEDURE — 3700000000 HC ANESTHESIA ATTENDED CARE: Performed by: SURGERY

## 2024-03-06 PROCEDURE — 7100000000 HC PACU RECOVERY - FIRST 15 MIN: Performed by: SURGERY

## 2024-03-06 PROCEDURE — 3600000014 HC SURGERY LEVEL 4 ADDTL 15MIN: Performed by: SURGERY

## 2024-03-06 PROCEDURE — 2W13X6Z COMPRESSION OF ABDOMINAL WALL USING PRESSURE DRESSING: ICD-10-PCS | Performed by: SURGERY

## 2024-03-06 PROCEDURE — 2580000003 HC RX 258: Performed by: ANESTHESIOLOGY

## 2024-03-06 PROCEDURE — 88307 TISSUE EXAM BY PATHOLOGIST: CPT

## 2024-03-06 PROCEDURE — 3700000001 HC ADD 15 MINUTES (ANESTHESIA): Performed by: SURGERY

## 2024-03-06 PROCEDURE — 2720000010 HC SURG SUPPLY STERILE: Performed by: SURGERY

## 2024-03-06 PROCEDURE — 82565 ASSAY OF CREATININE: CPT

## 2024-03-06 PROCEDURE — 6370000000 HC RX 637 (ALT 250 FOR IP): Performed by: SURGERY

## 2024-03-06 PROCEDURE — 6360000002 HC RX W HCPCS: Performed by: ANESTHESIOLOGY

## 2024-03-06 PROCEDURE — 2500000003 HC RX 250 WO HCPCS: Performed by: NURSE ANESTHETIST, CERTIFIED REGISTERED

## 2024-03-06 PROCEDURE — 84132 ASSAY OF SERUM POTASSIUM: CPT

## 2024-03-06 PROCEDURE — 87076 CULTURE ANAEROBE IDENT EACH: CPT

## 2024-03-06 PROCEDURE — 2580000003 HC RX 258: Performed by: SPECIALIST

## 2024-03-06 PROCEDURE — 11043 DBRDMT MUSC&/FSCA 1ST 20/<: CPT | Performed by: SURGERY

## 2024-03-06 PROCEDURE — 6360000002 HC RX W HCPCS: Performed by: SPECIALIST

## 2024-03-06 PROCEDURE — 87086 URINE CULTURE/COLONY COUNT: CPT

## 2024-03-06 PROCEDURE — 2060000000 HC ICU INTERMEDIATE R&B

## 2024-03-06 PROCEDURE — 87070 CULTURE OTHR SPECIMN AEROBIC: CPT

## 2024-03-06 PROCEDURE — 0DBL0ZZ EXCISION OF TRANSVERSE COLON, OPEN APPROACH: ICD-10-PCS | Performed by: SURGERY

## 2024-03-06 PROCEDURE — 6360000002 HC RX W HCPCS: Performed by: NURSE ANESTHETIST, CERTIFIED REGISTERED

## 2024-03-06 PROCEDURE — 82947 ASSAY GLUCOSE BLOOD QUANT: CPT

## 2024-03-06 PROCEDURE — 87077 CULTURE AEROBIC IDENTIFY: CPT

## 2024-03-06 PROCEDURE — 0WPF0JZ REMOVAL OF SYNTHETIC SUBSTITUTE FROM ABDOMINAL WALL, OPEN APPROACH: ICD-10-PCS | Performed by: SURGERY

## 2024-03-06 PROCEDURE — 3600000004 HC SURGERY LEVEL 4 BASE: Performed by: SURGERY

## 2024-03-06 PROCEDURE — 87075 CULTR BACTERIA EXCEPT BLOOD: CPT

## 2024-03-06 PROCEDURE — 97606 NEG PRS WND THER DME>50 SQCM: CPT | Performed by: SURGERY

## 2024-03-06 PROCEDURE — 49402 REMOVE FOREIGN BODY ADBOMEN: CPT | Performed by: SURGERY

## 2024-03-06 PROCEDURE — 87185 SC STD ENZYME DETCJ PER NZM: CPT

## 2024-03-06 PROCEDURE — 7100000001 HC PACU RECOVERY - ADDTL 15 MIN: Performed by: SURGERY

## 2024-03-06 PROCEDURE — 3E1M38Z IRRIGATION OF PERITONEAL CAVITY USING IRRIGATING SUBSTANCE, PERCUTANEOUS APPROACH: ICD-10-PCS | Performed by: SURGERY

## 2024-03-06 RX ORDER — MORPHINE SULFATE 4 MG/ML
6 INJECTION, SOLUTION INTRAMUSCULAR; INTRAVENOUS
Status: DISCONTINUED | OUTPATIENT
Start: 2024-03-06 | End: 2024-03-16 | Stop reason: HOSPADM

## 2024-03-06 RX ORDER — SODIUM CHLORIDE 0.9 % (FLUSH) 0.9 %
5-40 SYRINGE (ML) INJECTION EVERY 12 HOURS SCHEDULED
Status: DISCONTINUED | OUTPATIENT
Start: 2024-03-06 | End: 2024-03-06 | Stop reason: HOSPADM

## 2024-03-06 RX ORDER — ALBUTEROL SULFATE 2.5 MG/3ML
2.5 SOLUTION RESPIRATORY (INHALATION) EVERY 6 HOURS PRN
Status: DISCONTINUED | OUTPATIENT
Start: 2024-03-06 | End: 2024-03-16 | Stop reason: HOSPADM

## 2024-03-06 RX ORDER — SODIUM CHLORIDE 9 MG/ML
INJECTION, SOLUTION INTRAVENOUS PRN
Status: DISCONTINUED | OUTPATIENT
Start: 2024-03-06 | End: 2024-03-16 | Stop reason: HOSPADM

## 2024-03-06 RX ORDER — POTASSIUM CHLORIDE 7.45 MG/ML
10 INJECTION INTRAVENOUS PRN
Status: DISCONTINUED | OUTPATIENT
Start: 2024-03-06 | End: 2024-03-16 | Stop reason: HOSPADM

## 2024-03-06 RX ORDER — MAGNESIUM HYDROXIDE 1200 MG/15ML
LIQUID ORAL CONTINUOUS PRN
Status: DISCONTINUED | OUTPATIENT
Start: 2024-03-06 | End: 2024-03-06 | Stop reason: HOSPADM

## 2024-03-06 RX ORDER — LOSARTAN POTASSIUM 50 MG/1
25 TABLET ORAL NIGHTLY
Status: DISCONTINUED | OUTPATIENT
Start: 2024-03-06 | End: 2024-03-16 | Stop reason: HOSPADM

## 2024-03-06 RX ORDER — ENOXAPARIN SODIUM 100 MG/ML
40 INJECTION SUBCUTANEOUS DAILY
Status: DISCONTINUED | OUTPATIENT
Start: 2024-03-07 | End: 2024-03-13

## 2024-03-06 RX ORDER — MORPHINE SULFATE 4 MG/ML
4 INJECTION, SOLUTION INTRAMUSCULAR; INTRAVENOUS
Status: DISCONTINUED | OUTPATIENT
Start: 2024-03-06 | End: 2024-03-16 | Stop reason: HOSPADM

## 2024-03-06 RX ORDER — FUROSEMIDE 20 MG/1
10 TABLET ORAL DAILY
Status: DISCONTINUED | OUTPATIENT
Start: 2024-03-07 | End: 2024-03-16 | Stop reason: HOSPADM

## 2024-03-06 RX ORDER — MIDAZOLAM HYDROCHLORIDE 1 MG/ML
INJECTION INTRAMUSCULAR; INTRAVENOUS PRN
Status: DISCONTINUED | OUTPATIENT
Start: 2024-03-06 | End: 2024-03-06 | Stop reason: SDUPTHER

## 2024-03-06 RX ORDER — ONDANSETRON 2 MG/ML
INJECTION INTRAMUSCULAR; INTRAVENOUS PRN
Status: DISCONTINUED | OUTPATIENT
Start: 2024-03-06 | End: 2024-03-06 | Stop reason: SDUPTHER

## 2024-03-06 RX ORDER — SODIUM CHLORIDE 0.9 % (FLUSH) 0.9 %
5-40 SYRINGE (ML) INJECTION PRN
Status: DISCONTINUED | OUTPATIENT
Start: 2024-03-06 | End: 2024-03-06 | Stop reason: HOSPADM

## 2024-03-06 RX ORDER — FENTANYL CITRATE 50 UG/ML
INJECTION, SOLUTION INTRAMUSCULAR; INTRAVENOUS PRN
Status: DISCONTINUED | OUTPATIENT
Start: 2024-03-06 | End: 2024-03-06 | Stop reason: SDUPTHER

## 2024-03-06 RX ORDER — SODIUM CHLORIDE 0.9 % (FLUSH) 0.9 %
10 SYRINGE (ML) INJECTION PRN
Status: DISCONTINUED | OUTPATIENT
Start: 2024-03-06 | End: 2024-03-16 | Stop reason: HOSPADM

## 2024-03-06 RX ORDER — LABETALOL HYDROCHLORIDE 5 MG/ML
10 INJECTION, SOLUTION INTRAVENOUS EVERY 4 HOURS PRN
Status: DISCONTINUED | OUTPATIENT
Start: 2024-03-06 | End: 2024-03-16 | Stop reason: HOSPADM

## 2024-03-06 RX ORDER — MEPERIDINE HYDROCHLORIDE 50 MG/ML
12.5 INJECTION INTRAMUSCULAR; INTRAVENOUS; SUBCUTANEOUS EVERY 5 MIN PRN
Status: DISCONTINUED | OUTPATIENT
Start: 2024-03-06 | End: 2024-03-06 | Stop reason: HOSPADM

## 2024-03-06 RX ORDER — DROPERIDOL 2.5 MG/ML
0.62 INJECTION, SOLUTION INTRAMUSCULAR; INTRAVENOUS
Status: DISCONTINUED | OUTPATIENT
Start: 2024-03-06 | End: 2024-03-06 | Stop reason: HOSPADM

## 2024-03-06 RX ORDER — ROCURONIUM BROMIDE 10 MG/ML
INJECTION, SOLUTION INTRAVENOUS PRN
Status: DISCONTINUED | OUTPATIENT
Start: 2024-03-06 | End: 2024-03-06 | Stop reason: SDUPTHER

## 2024-03-06 RX ORDER — DIPHENHYDRAMINE HYDROCHLORIDE 50 MG/ML
12.5 INJECTION INTRAMUSCULAR; INTRAVENOUS
Status: DISCONTINUED | OUTPATIENT
Start: 2024-03-06 | End: 2024-03-06 | Stop reason: HOSPADM

## 2024-03-06 RX ORDER — DEXAMETHASONE SODIUM PHOSPHATE 10 MG/ML
INJECTION INTRAMUSCULAR; INTRAVENOUS PRN
Status: DISCONTINUED | OUTPATIENT
Start: 2024-03-06 | End: 2024-03-06 | Stop reason: SDUPTHER

## 2024-03-06 RX ORDER — ALBUTEROL SULFATE 90 UG/1
1 AEROSOL, METERED RESPIRATORY (INHALATION) EVERY 4 HOURS PRN
Status: DISCONTINUED | OUTPATIENT
Start: 2024-03-06 | End: 2024-03-16 | Stop reason: HOSPADM

## 2024-03-06 RX ORDER — ACETAMINOPHEN 325 MG/1
650 TABLET ORAL EVERY 4 HOURS PRN
Status: DISCONTINUED | OUTPATIENT
Start: 2024-03-06 | End: 2024-03-16 | Stop reason: HOSPADM

## 2024-03-06 RX ORDER — SODIUM CHLORIDE, SODIUM LACTATE, POTASSIUM CHLORIDE, CALCIUM CHLORIDE 600; 310; 30; 20 MG/100ML; MG/100ML; MG/100ML; MG/100ML
INJECTION, SOLUTION INTRAVENOUS CONTINUOUS
Status: DISCONTINUED | OUTPATIENT
Start: 2024-03-06 | End: 2024-03-08

## 2024-03-06 RX ORDER — METOCLOPRAMIDE HYDROCHLORIDE 5 MG/ML
10 INJECTION INTRAMUSCULAR; INTRAVENOUS
Status: DISCONTINUED | OUTPATIENT
Start: 2024-03-06 | End: 2024-03-06 | Stop reason: HOSPADM

## 2024-03-06 RX ORDER — POTASSIUM CHLORIDE 29.8 MG/ML
20 INJECTION INTRAVENOUS PRN
Status: DISCONTINUED | OUTPATIENT
Start: 2024-03-06 | End: 2024-03-16 | Stop reason: HOSPADM

## 2024-03-06 RX ORDER — CLINDAMYCIN PHOSPHATE 300 MG/50ML
300 INJECTION, SOLUTION INTRAVENOUS EVERY 8 HOURS
Status: DISCONTINUED | OUTPATIENT
Start: 2024-03-06 | End: 2024-03-08

## 2024-03-06 RX ORDER — SODIUM CHLORIDE 0.9 % (FLUSH) 0.9 %
10 SYRINGE (ML) INJECTION EVERY 12 HOURS SCHEDULED
Status: DISCONTINUED | OUTPATIENT
Start: 2024-03-06 | End: 2024-03-16 | Stop reason: HOSPADM

## 2024-03-06 RX ORDER — ONDANSETRON 2 MG/ML
4 INJECTION INTRAMUSCULAR; INTRAVENOUS EVERY 6 HOURS PRN
Status: DISCONTINUED | OUTPATIENT
Start: 2024-03-06 | End: 2024-03-16 | Stop reason: HOSPADM

## 2024-03-06 RX ORDER — BUDESONIDE AND FORMOTEROL FUMARATE DIHYDRATE 160; 4.5 UG/1; UG/1
2 AEROSOL RESPIRATORY (INHALATION)
Status: DISCONTINUED | OUTPATIENT
Start: 2024-03-07 | End: 2024-03-16 | Stop reason: HOSPADM

## 2024-03-06 RX ORDER — CYCLOBENZAPRINE HCL 5 MG
5 TABLET ORAL 3 TIMES DAILY PRN
Status: DISCONTINUED | OUTPATIENT
Start: 2024-03-06 | End: 2024-03-16 | Stop reason: HOSPADM

## 2024-03-06 RX ORDER — NALOXONE HYDROCHLORIDE 0.4 MG/ML
INJECTION, SOLUTION INTRAMUSCULAR; INTRAVENOUS; SUBCUTANEOUS PRN
Status: DISCONTINUED | OUTPATIENT
Start: 2024-03-06 | End: 2024-03-06 | Stop reason: HOSPADM

## 2024-03-06 RX ORDER — PROPOFOL 10 MG/ML
INJECTION, EMULSION INTRAVENOUS PRN
Status: DISCONTINUED | OUTPATIENT
Start: 2024-03-06 | End: 2024-03-06 | Stop reason: SDUPTHER

## 2024-03-06 RX ORDER — SODIUM CHLORIDE, SODIUM LACTATE, POTASSIUM CHLORIDE, CALCIUM CHLORIDE 600; 310; 30; 20 MG/100ML; MG/100ML; MG/100ML; MG/100ML
INJECTION, SOLUTION INTRAVENOUS CONTINUOUS
Status: DISCONTINUED | OUTPATIENT
Start: 2024-03-06 | End: 2024-03-06 | Stop reason: HOSPADM

## 2024-03-06 RX ORDER — MAGNESIUM SULFATE IN WATER 40 MG/ML
2000 INJECTION, SOLUTION INTRAVENOUS PRN
Status: DISCONTINUED | OUTPATIENT
Start: 2024-03-06 | End: 2024-03-16 | Stop reason: HOSPADM

## 2024-03-06 RX ORDER — HYDRALAZINE HYDROCHLORIDE 20 MG/ML
10 INJECTION INTRAMUSCULAR; INTRAVENOUS
Status: DISCONTINUED | OUTPATIENT
Start: 2024-03-06 | End: 2024-03-06 | Stop reason: HOSPADM

## 2024-03-06 RX ORDER — SODIUM CHLORIDE 9 MG/ML
INJECTION, SOLUTION INTRAVENOUS PRN
Status: DISCONTINUED | OUTPATIENT
Start: 2024-03-06 | End: 2024-03-06 | Stop reason: HOSPADM

## 2024-03-06 RX ORDER — SODIUM CHLORIDE, SODIUM LACTATE, POTASSIUM CHLORIDE, CALCIUM CHLORIDE 600; 310; 30; 20 MG/100ML; MG/100ML; MG/100ML; MG/100ML
INJECTION, SOLUTION INTRAVENOUS CONTINUOUS PRN
Status: DISCONTINUED | OUTPATIENT
Start: 2024-03-06 | End: 2024-03-06 | Stop reason: SDUPTHER

## 2024-03-06 RX ORDER — LIDOCAINE HYDROCHLORIDE 10 MG/ML
INJECTION, SOLUTION EPIDURAL; INFILTRATION; INTRACAUDAL; PERINEURAL PRN
Status: DISCONTINUED | OUTPATIENT
Start: 2024-03-06 | End: 2024-03-06 | Stop reason: SDUPTHER

## 2024-03-06 RX ORDER — CLINDAMYCIN PHOSPHATE 900 MG/50ML
900 INJECTION, SOLUTION INTRAVENOUS
Status: COMPLETED | OUTPATIENT
Start: 2024-03-06 | End: 2024-03-06

## 2024-03-06 RX ADMIN — FENTANYL CITRATE 50 MCG: 0.05 INJECTION, SOLUTION INTRAMUSCULAR; INTRAVENOUS at 14:08

## 2024-03-06 RX ADMIN — SODIUM CHLORIDE, POTASSIUM CHLORIDE, SODIUM LACTATE AND CALCIUM CHLORIDE: 600; 310; 30; 20 INJECTION, SOLUTION INTRAVENOUS at 15:04

## 2024-03-06 RX ADMIN — LIDOCAINE HYDROCHLORIDE 50 MG: 10 INJECTION, SOLUTION EPIDURAL; INFILTRATION; INTRACAUDAL; PERINEURAL at 13:46

## 2024-03-06 RX ADMIN — FENTANYL CITRATE 50 MCG: 0.05 INJECTION, SOLUTION INTRAMUSCULAR; INTRAVENOUS at 17:18

## 2024-03-06 RX ADMIN — ROCURONIUM BROMIDE 20 MG: 10 INJECTION INTRAVENOUS at 16:28

## 2024-03-06 RX ADMIN — LOSARTAN POTASSIUM 25 MG: 50 TABLET, FILM COATED ORAL at 20:54

## 2024-03-06 RX ADMIN — MORPHINE SULFATE 6 MG: 4 INJECTION INTRAVENOUS at 21:23

## 2024-03-06 RX ADMIN — SODIUM CHLORIDE, POTASSIUM CHLORIDE, SODIUM LACTATE AND CALCIUM CHLORIDE: 600; 310; 30; 20 INJECTION, SOLUTION INTRAVENOUS at 13:21

## 2024-03-06 RX ADMIN — MIDAZOLAM 2 MG: 1 INJECTION INTRAMUSCULAR; INTRAVENOUS at 13:41

## 2024-03-06 RX ADMIN — CYCLOBENZAPRINE HYDROCHLORIDE 5 MG: 5 TABLET, FILM COATED ORAL at 23:38

## 2024-03-06 RX ADMIN — SODIUM CHLORIDE, POTASSIUM CHLORIDE, SODIUM LACTATE AND CALCIUM CHLORIDE: 600; 310; 30; 20 INJECTION, SOLUTION INTRAVENOUS at 13:36

## 2024-03-06 RX ADMIN — DEXAMETHASONE SODIUM PHOSPHATE 5 MG: 10 INJECTION INTRAMUSCULAR; INTRAVENOUS at 15:01

## 2024-03-06 RX ADMIN — SODIUM CHLORIDE: 9 INJECTION, SOLUTION INTRAVENOUS at 18:23

## 2024-03-06 RX ADMIN — CLINDAMYCIN IN 5 PERCENT DEXTROSE 300 MG: 6 INJECTION, SOLUTION INTRAVENOUS at 21:09

## 2024-03-06 RX ADMIN — ROCURONIUM BROMIDE 20 MG: 10 INJECTION INTRAVENOUS at 14:30

## 2024-03-06 RX ADMIN — SODIUM CHLORIDE, PRESERVATIVE FREE 10 ML: 5 INJECTION INTRAVENOUS at 20:54

## 2024-03-06 RX ADMIN — SUGAMMADEX 200 MG: 100 INJECTION, SOLUTION INTRAVENOUS at 17:15

## 2024-03-06 RX ADMIN — FENTANYL CITRATE 50 MCG: 0.05 INJECTION, SOLUTION INTRAMUSCULAR; INTRAVENOUS at 14:20

## 2024-03-06 RX ADMIN — PROPOFOL 150 MG: 10 INJECTION, EMULSION INTRAVENOUS at 13:46

## 2024-03-06 RX ADMIN — SODIUM CHLORIDE, POTASSIUM CHLORIDE, SODIUM LACTATE AND CALCIUM CHLORIDE: 600; 310; 30; 20 INJECTION, SOLUTION INTRAVENOUS at 19:32

## 2024-03-06 RX ADMIN — HYDROMORPHONE HYDROCHLORIDE 0.5 MG: 1 INJECTION, SOLUTION INTRAMUSCULAR; INTRAVENOUS; SUBCUTANEOUS at 17:58

## 2024-03-06 RX ADMIN — ROCURONIUM BROMIDE 50 MG: 10 INJECTION INTRAVENOUS at 13:46

## 2024-03-06 RX ADMIN — CLINDAMYCIN PHOSPHATE 900 MG: 900 INJECTION, SOLUTION INTRAVENOUS at 13:59

## 2024-03-06 RX ADMIN — FENTANYL CITRATE 100 MCG: 0.05 INJECTION, SOLUTION INTRAMUSCULAR; INTRAVENOUS at 13:46

## 2024-03-06 RX ADMIN — ONDANSETRON 4 MG: 2 INJECTION INTRAMUSCULAR; INTRAVENOUS at 17:13

## 2024-03-06 RX ADMIN — HYDROMORPHONE HYDROCHLORIDE 0.5 MG: 1 INJECTION, SOLUTION INTRAMUSCULAR; INTRAVENOUS; SUBCUTANEOUS at 18:06

## 2024-03-06 ASSESSMENT — PAIN - FUNCTIONAL ASSESSMENT
PAIN_FUNCTIONAL_ASSESSMENT: PREVENTS OR INTERFERES SOME ACTIVE ACTIVITIES AND ADLS
PAIN_FUNCTIONAL_ASSESSMENT: 0-10
PAIN_FUNCTIONAL_ASSESSMENT: PREVENTS OR INTERFERES SOME ACTIVE ACTIVITIES AND ADLS

## 2024-03-06 ASSESSMENT — PAIN DESCRIPTION - DESCRIPTORS
DESCRIPTORS: ACHING

## 2024-03-06 ASSESSMENT — PAIN DESCRIPTION - ORIENTATION
ORIENTATION: MID;LOWER
ORIENTATION: MID;LOWER

## 2024-03-06 ASSESSMENT — PAIN DESCRIPTION - LOCATION
LOCATION: ABDOMEN
LOCATION: BACK
LOCATION: ABDOMEN
LOCATION: ABDOMEN
LOCATION: ABDOMEN;BACK
LOCATION: BACK
LOCATION: BACK

## 2024-03-06 ASSESSMENT — PAIN SCALES - GENERAL
PAINLEVEL_OUTOF10: 0
PAINLEVEL_OUTOF10: 7
PAINLEVEL_OUTOF10: 3
PAINLEVEL_OUTOF10: 7
PAINLEVEL_OUTOF10: 2
PAINLEVEL_OUTOF10: 6
PAINLEVEL_OUTOF10: 3
PAINLEVEL_OUTOF10: 7
PAINLEVEL_OUTOF10: 4

## 2024-03-06 NOTE — BRIEF OP NOTE
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  Fascial dehiscence  Early signs of fistulization of small bowel to mesh x2       Procedure(s):  Exploratory laparotomy  Takedown transverse colocutaneous fistula  Takedown of small bowel adhesions to abdominal wall mesh  Explant of abdominal wall mesh x2  Abdominal washout  Placement of wound vac    Surgeon(s):  Christo Purdy DO    Assistant:  Resident: Fabi Nelson DO    Anesthesia: General    Estimated Blood Loss (mL): less than 100     Complications: None    Specimens:   ID Type Source Tests Collected by Time Destination   1 : ABDOMINAL WALL ABCESS CULTURES Swab Abdomen CULTURE, ANAEROBIC AND AEROBIC Christo Purdy DO 3/6/2024 1417    2 : URINE FOR CULTURE FROM NEWLY INSERTED CATHETER Urine Urine, indwelling catheter CULTURE, URINE Christo Purdy DO 3/6/2024 1423    A : PORTION OF TRANSVERSE COLON Tissue Colon-Transverse SURGICAL PATHOLOGY Christo Purdy DO 3/6/2024 1437        Implants:  * No implants in log *      Drains:   Negative Pressure Wound Therapy Abdomen Lower;Medial (Active)   Wound Type Surgical 03/06/24 1735   Unit Type VAC ulta 03/06/24 1735   Dressing Type Black Foam 03/06/24 1735   Cycle Continuous 03/06/24 1735   Target Pressure (mmHg) 125 03/06/24 1735   Canister changed? No 03/06/24 1735   Dressing Status Clean, dry & intact 03/06/24 1735   Drainage Amount Scant 03/06/24 1735   Drainage Description Serosanguinous 03/06/24 1735   Wound Assessment Other (Comment) 03/06/24 1735       NG/OG/NJ/NE Tube Nasogastric 18 fr Right nostril (Active)       Findings: as above wound class 4      Electronically signed by Christo Purdy DO on 3/6/2024 at 5:59 PM

## 2024-03-06 NOTE — ANESTHESIA PRE PROCEDURE
\"LABABO\", \"LABRH\"    Drug/Infectious Status (If Applicable):  Lab Results   Component Value Date/Time    HEPCAB NONREACTIVE 07/19/2017 07:32 AM       COVID-19 Screening (If Applicable): No results found for: \"COVID19\"        Anesthesia Evaluation  Patient summary reviewed and Nursing notes reviewed  Airway: Mallampati: III  TM distance: >3 FB   Neck ROM: limited  Mouth opening: > = 3 FB   Dental:    (+) edentulous      Pulmonary:   (+)  COPD:    sleep apnea:   decreased breath sounds: bilateral    asthma:                           ROS comment: Oxygen dependent 2 L/min  61.5 pack year smoker quit 2014   Cardiovascular:    (+) hypertension:, hyperlipidemia                  Neuro/Psych:               GI/Hepatic/Renal:   (+) morbid obesity          Endo/Other:    (+) DiabetesType II DM.                 Abdominal:             Vascular:          Other Findings:       Anesthesia Plan      general     ASA 3       Induction: intravenous.    MIPS: Postoperative opioids intended and Prophylactic antiemetics administered.  Anesthetic plan and risks discussed with patient.    Use of blood products discussed with patient whom consented to blood products.    Plan discussed with CRNA.                Genaro Echevarria MD   3/6/2024

## 2024-03-06 NOTE — H&P
[facility] General Surgery  [facility]      Patient's Name/ Date of Birth/ Gender: Irais Max / 1955 (68 y.o.) / female     Attending physician: Christo Purdy DO    CC: abdominal wall abscess    History of present Illness: Irais Max is a 68 y.o. female, presents today for exploratory laparotomy for suspected colo or enterocutaneous fistula s/p robot incisional hernia repair with IPOM.     Past Medical History:  has a past medical history of Abdominal pain, Anxiety, Asthma, COPD (chronic obstructive pulmonary disease) ([facility]), COVID-19 vaccine series completed, Diabetes mellitus ([facility]), Full dentures, Goiter, Hyperlipidemia, Hyperparathyroidism ([facility]), Hypertension, Lives alone, Obesity, Oxygen dependent, Recurrent ventral hernia, Sleep apnea, Under care of team, Under care of team, Wears glasses, and Wellness examination.    Past Surgical History:   Past Surgical History:   Procedure Laterality Date     SECTION  1976    HERNIA REPAIR N/A 2024    XI ROBOTIC LAPAROSCOPIC VENTRAL HERNIA REPAIR WITH MESH, EXPLANTATION OF OLD MESH, LYSIS OF ADHESIONS performed by Christo Purdy DO at [facility] OR    HYSTERECTOMY (CERVIX STATUS UNKNOWN)      with BSO    TUBAL LIGATION      with c section    VENTRAL HERNIA REPAIR      x 2 prior to 2024       Social History:  reports that she quit smoking about 9 years ago. Her smoking use included cigarettes. She started smoking about 50 years ago. She has a 61.5 pack-year smoking history. She has never used smokeless tobacco. She reports that she does not drink alcohol and does not use drugs.    Family History: family history includes No Known Problems in her sister. She was adopted.    Review of Systems:   General: Denies fever, chills, night sweats, weight loss, malaise, fatigue  HEENT: Denies sore throat, sinus problems, allergic rhinosinusitis  Card: Denies chest pain, palpitations, orthopnea/PND. Denies h/o murmurs  Pulm: Denies

## 2024-03-07 LAB
BASOPHILS # BLD: 0 K/UL (ref 0–0.2)
BASOPHILS NFR BLD: 0 % (ref 0–2)
EOSINOPHIL # BLD: 0.15 K/UL (ref 0–0.4)
EOSINOPHILS RELATIVE PERCENT: 1 % (ref 1–4)
ERYTHROCYTE [DISTWIDTH] IN BLOOD BY AUTOMATED COUNT: 13.9 % (ref 11.8–14.4)
HCT VFR BLD AUTO: 41.8 % (ref 36.3–47.1)
HGB BLD-MCNC: 12.4 G/DL (ref 11.9–15.1)
IMM GRANULOCYTES # BLD AUTO: 0.15 K/UL (ref 0–0.3)
IMM GRANULOCYTES NFR BLD: 1 %
LYMPHOCYTES NFR BLD: 0.46 K/UL (ref 1–4.8)
LYMPHOCYTES RELATIVE PERCENT: 3 % (ref 24–44)
MAGNESIUM SERPL-MCNC: 2.4 MG/DL (ref 1.6–2.4)
MCH RBC QN AUTO: 28.2 PG (ref 25.2–33.5)
MCHC RBC AUTO-ENTMCNC: 29.7 G/DL (ref 28.4–34.8)
MCV RBC AUTO: 95 FL (ref 82.6–102.9)
MICROORGANISM SPEC CULT: NO GROWTH
MONOCYTES NFR BLD: 1.07 K/UL (ref 0.1–0.8)
MONOCYTES NFR BLD: 7 % (ref 1–7)
MORPHOLOGY: ABNORMAL
MORPHOLOGY: ABNORMAL
NEUTROPHILS NFR BLD: 88 % (ref 36–66)
NEUTS SEG NFR BLD: 13.47 K/UL (ref 1.8–7.7)
NRBC BLD-RTO: 0 PER 100 WBC
PHOSPHATE SERPL-MCNC: 4 MG/DL (ref 2.5–4.5)
PLATELET # BLD AUTO: 345 K/UL (ref 138–453)
PLATELET, FLUORESCENCE: 345 K/UL (ref 138–453)
PMV BLD AUTO: 9.5 FL (ref 8.1–13.5)
RBC # BLD AUTO: 4.4 M/UL (ref 3.95–5.11)
SPECIMEN DESCRIPTION: NORMAL
WBC OTHER # BLD: 15.3 K/UL (ref 3.5–11.3)

## 2024-03-07 PROCEDURE — 6360000002 HC RX W HCPCS: Performed by: SURGERY

## 2024-03-07 PROCEDURE — 2060000000 HC ICU INTERMEDIATE R&B

## 2024-03-07 PROCEDURE — 97535 SELF CARE MNGMENT TRAINING: CPT

## 2024-03-07 PROCEDURE — 44140 PARTIAL REMOVAL OF COLON: CPT | Performed by: SURGERY

## 2024-03-07 PROCEDURE — 6370000000 HC RX 637 (ALT 250 FOR IP): Performed by: SURGERY

## 2024-03-07 PROCEDURE — 97530 THERAPEUTIC ACTIVITIES: CPT

## 2024-03-07 PROCEDURE — 6370000000 HC RX 637 (ALT 250 FOR IP)

## 2024-03-07 PROCEDURE — 99024 POSTOP FOLLOW-UP VISIT: CPT | Performed by: SURGERY

## 2024-03-07 PROCEDURE — 84100 ASSAY OF PHOSPHORUS: CPT

## 2024-03-07 PROCEDURE — 2700000000 HC OXYGEN THERAPY PER DAY

## 2024-03-07 PROCEDURE — 85025 COMPLETE CBC W/AUTO DIFF WBC: CPT

## 2024-03-07 PROCEDURE — 97116 GAIT TRAINING THERAPY: CPT

## 2024-03-07 PROCEDURE — 51798 US URINE CAPACITY MEASURE: CPT

## 2024-03-07 PROCEDURE — 97166 OT EVAL MOD COMPLEX 45 MIN: CPT

## 2024-03-07 PROCEDURE — 83735 ASSAY OF MAGNESIUM: CPT

## 2024-03-07 PROCEDURE — 94761 N-INVAS EAR/PLS OXIMETRY MLT: CPT

## 2024-03-07 PROCEDURE — 2580000003 HC RX 258: Performed by: SURGERY

## 2024-03-07 PROCEDURE — 36415 COLL VENOUS BLD VENIPUNCTURE: CPT

## 2024-03-07 PROCEDURE — 94640 AIRWAY INHALATION TREATMENT: CPT

## 2024-03-07 PROCEDURE — 97162 PT EVAL MOD COMPLEX 30 MIN: CPT

## 2024-03-07 RX ORDER — LIDOCAINE 4 G/G
1 PATCH TOPICAL DAILY
Status: DISCONTINUED | OUTPATIENT
Start: 2024-03-07 | End: 2024-03-16 | Stop reason: HOSPADM

## 2024-03-07 RX ADMIN — CYCLOBENZAPRINE HYDROCHLORIDE 5 MG: 5 TABLET, FILM COATED ORAL at 20:27

## 2024-03-07 RX ADMIN — SODIUM CHLORIDE, POTASSIUM CHLORIDE, SODIUM LACTATE AND CALCIUM CHLORIDE: 600; 310; 30; 20 INJECTION, SOLUTION INTRAVENOUS at 16:25

## 2024-03-07 RX ADMIN — MORPHINE SULFATE 4 MG: 4 INJECTION INTRAVENOUS at 06:14

## 2024-03-07 RX ADMIN — BUDESONIDE AND FORMOTEROL FUMARATE DIHYDRATE 2 PUFF: 160; 4.5 AEROSOL RESPIRATORY (INHALATION) at 20:38

## 2024-03-07 RX ADMIN — MORPHINE SULFATE 4 MG: 4 INJECTION INTRAVENOUS at 02:19

## 2024-03-07 RX ADMIN — CLINDAMYCIN IN 5 PERCENT DEXTROSE 300 MG: 6 INJECTION, SOLUTION INTRAVENOUS at 20:37

## 2024-03-07 RX ADMIN — PHENOL 1 SPRAY: 1.4 SPRAY ORAL at 20:25

## 2024-03-07 RX ADMIN — MORPHINE SULFATE 4 MG: 4 INJECTION INTRAVENOUS at 13:57

## 2024-03-07 RX ADMIN — SODIUM CHLORIDE, PRESERVATIVE FREE 10 ML: 5 INJECTION INTRAVENOUS at 20:28

## 2024-03-07 RX ADMIN — CLINDAMYCIN IN 5 PERCENT DEXTROSE 300 MG: 6 INJECTION, SOLUTION INTRAVENOUS at 05:42

## 2024-03-07 RX ADMIN — SODIUM CHLORIDE, POTASSIUM CHLORIDE, SODIUM LACTATE AND CALCIUM CHLORIDE: 600; 310; 30; 20 INJECTION, SOLUTION INTRAVENOUS at 06:19

## 2024-03-07 RX ADMIN — BUDESONIDE AND FORMOTEROL FUMARATE DIHYDRATE 2 PUFF: 160; 4.5 AEROSOL RESPIRATORY (INHALATION) at 09:41

## 2024-03-07 RX ADMIN — ACETAMINOPHEN 650 MG: 325 TABLET ORAL at 20:27

## 2024-03-07 RX ADMIN — CLINDAMYCIN IN 5 PERCENT DEXTROSE 300 MG: 6 INJECTION, SOLUTION INTRAVENOUS at 13:53

## 2024-03-07 RX ADMIN — ENOXAPARIN SODIUM 40 MG: 100 INJECTION SUBCUTANEOUS at 08:54

## 2024-03-07 RX ADMIN — TIOTROPIUM BROMIDE INHALATION SPRAY 2 PUFF: 3.12 SPRAY, METERED RESPIRATORY (INHALATION) at 09:41

## 2024-03-07 RX ADMIN — LOSARTAN POTASSIUM 25 MG: 50 TABLET, FILM COATED ORAL at 20:28

## 2024-03-07 RX ADMIN — SODIUM CHLORIDE, PRESERVATIVE FREE 10 ML: 5 INJECTION INTRAVENOUS at 06:17

## 2024-03-07 RX ADMIN — PHENOL 1 SPRAY: 1.4 SPRAY ORAL at 13:52

## 2024-03-07 ASSESSMENT — PAIN SCALES - GENERAL
PAINLEVEL_OUTOF10: 6
PAINLEVEL_OUTOF10: 5
PAINLEVEL_OUTOF10: 2
PAINLEVEL_OUTOF10: 4
PAINLEVEL_OUTOF10: 0
PAINLEVEL_OUTOF10: 5
PAINLEVEL_OUTOF10: 5
PAINLEVEL_OUTOF10: 2
PAINLEVEL_OUTOF10: 3
PAINLEVEL_OUTOF10: 3
PAINLEVEL_OUTOF10: 2
PAINLEVEL_OUTOF10: 0

## 2024-03-07 ASSESSMENT — PAIN DESCRIPTION - LOCATION
LOCATION: BACK
LOCATION: BACK;THROAT
LOCATION: BACK
LOCATION: BACK
LOCATION: ABDOMEN
LOCATION: BACK

## 2024-03-07 ASSESSMENT — PAIN DESCRIPTION - ORIENTATION
ORIENTATION: MID;LOWER
ORIENTATION: MID;LOWER
ORIENTATION: LOWER;MID

## 2024-03-07 ASSESSMENT — PAIN - FUNCTIONAL ASSESSMENT
PAIN_FUNCTIONAL_ASSESSMENT: PREVENTS OR INTERFERES SOME ACTIVE ACTIVITIES AND ADLS
PAIN_FUNCTIONAL_ASSESSMENT: PREVENTS OR INTERFERES SOME ACTIVE ACTIVITIES AND ADLS

## 2024-03-07 ASSESSMENT — PAIN DESCRIPTION - DESCRIPTORS
DESCRIPTORS: DULL;DISCOMFORT
DESCRIPTORS: SORE;SPASM;ACHING
DESCRIPTORS: ACHING;DISCOMFORT;NAGGING;SORE
DESCRIPTORS: ACHING;DISCOMFORT;DULL;SPASM

## 2024-03-07 NOTE — CARE COORDINATION
Case Management Assessment  Initial Evaluation    Date/Time of Evaluation: 3/7/2024 12:06 PM  Assessment Completed by: Cecilia Henry RN    If patient is discharged prior to next notation, then this note serves as note for discharge by case management.    Patient Name: Irais Max                   YOB: 1955  Diagnosis: Abdominal wall pain [R10.9]  Post-operative pain [G89.18]  Colocutaneous fistula [K63.2]                   Date / Time: 3/6/2024 11:31 AM    Patient Admission Status: Inpatient   Readmission Risk (Low < 19, Mod (19-27), High > 27): Readmission Risk Score: 11.8    Current PCP: Carie Jones, DO  PCP verified by CM? (P) Yes    Chart Reviewed: Yes      History Provided by: (P) Patient  Patient Orientation: (P) Alert and Oriented    Patient Cognition: (P) Alert    Hospitalization in the last 30 days (Readmission):  No    If yes, Readmission Assessment in CM Navigator will be completed.    Advance Directives:      Code Status: Full Code   Patient's Primary Decision Maker is:        Discharge Planning:    Patient lives with: (P) Alone Type of Home: (P) Apartment  Primary Care Giver: (P) Self  Patient Support Systems include: (P) Friends/Neighbors   Current Financial resources: (P) Medicare, Medicaid  Current community resources:    Current services prior to admission: (P) None            Current DME:              Type of Home Care services:  (P) None    ADLS  Prior functional level: (P) Independent in ADLs/IADLs  Current functional level: (P) Independent in ADLs/IADLs    PT AM-PAC:   /24  OT AM-PAC:   /24    Family can provide assistance at DC:    Would you like Case Management to discuss the discharge plan with any other family members/significant others, and if so, who?    Plans to Return to Present Housing: (P) Yes  Other Identified Issues/Barriers to RETURNING to current housing: none  Potential Assistance needed at discharge: (P) Home Care            Potential DME:    Patient

## 2024-03-07 NOTE — PROGRESS NOTES
Occupational Therapy  Facility/Department: [address] ONC/MED SURG  Occupational Therapy Initial Assessment    Name: Irais Max  : 1955  MRN: [id number]  Date of Service: 3/7/2024    Per Medical Chart: \" Irais Max is a 68 y.o. female, presents 2024 for exploratory laparotomy for suspected colo or enterocutaneous fistula s/p robot incisional hernia repair with IPOM.\"     Discharge Recommendations:  Patient would benefit from continued therapy after discharge  OT Equipment Recommendations  Equipment Needed: Yes  Mobility Devices: ADL Assistive Devices  ADL Assistive Devices: Shower Chair with back;Grab Bars - toilet;Sock-Aid Hard       Patient Diagnosis(es): The encounter diagnosis was Abdominal wall pain.  Past Medical History:  has a past medical history of Abdominal pain, Anxiety, Asthma, COPD (chronic obstructive pulmonary disease) ([facility]), COVID-19 vaccine series completed, Diabetes mellitus ([facility]), Full dentures, Goiter, Hyperlipidemia, Hyperparathyroidism ([facility]), Hypertension, Lives alone, Obesity, Oxygen dependent, Recurrent ventral hernia, Sleep apnea, Under care of team, Under care of team, Wears glasses, and Wellness examination.  Past Surgical History:  has a past surgical history that includes Hysterectomy ();  section (); ventral hernia repair; Tubal ligation (); hernia repair (N/A, 2024); Exploratory laparotomy w/ bowel resection (2024); and laparotomy (N/A, 3/6/2024).           Assessment   Performance deficits / Impairments: Decreased functional mobility ;Decreased ADL status;Decreased safe awareness;Decreased endurance;Decreased balance;Decreased high-level IADLs  Assessment: Pt began session seated in recliner. Pt demo'd donning B socks with figure-4 method and MIN A d/t abdominal pain. Pt completed STS transfers and mobility with CGA and use of RW.  Pt engaging in toileting with MIN A for pericare/bottom care d/t decreased functional reach. Pt standing at

## 2024-03-07 NOTE — PROGRESS NOTES
Physical Therapy  Facility/Department: [address] ONC/MED SURG  Physical Therapy Initial Assessment    Name: Irais Max  : 1955  MRN: [id number]  Date of Service: 3/7/2024  Per Medical Chart: \" Irais Max is a 68 y.o. female, presents 2024 for exploratory laparotomy for suspected colo or enterocutaneous fistula s/p robot incisional hernia repair with IPOM.\"      Discharge Recommendations:  Patient would benefit from continued therapy after discharge          Patient Diagnosis(es): The encounter diagnosis was Abdominal wall pain.  Past Medical History:  has a past medical history of Abdominal pain, Anxiety, Asthma, COPD (chronic obstructive pulmonary disease) ([facility]), COVID-19 vaccine series completed, Diabetes mellitus ([facility]), Full dentures, Goiter, Hyperlipidemia, Hyperparathyroidism ([facility]), Hypertension, Lives alone, Obesity, Oxygen dependent, Recurrent ventral hernia, Sleep apnea, Under care of team, Under care of team, Wears glasses, and Wellness examination.  Past Surgical History:  has a past surgical history that includes Hysterectomy ();  section (); ventral hernia repair; Tubal ligation (); hernia repair (N/A, 2024); Exploratory laparotomy w/ bowel resection (2024); and laparotomy (N/A, 3/6/2024).    Assessment   Body Structures, Functions, Activity Limitations Requiring Skilled Therapeutic Intervention: Decreased functional mobility ;Increased pain;Decreased strength;Decreased balance  Assessment: Patient is having difficulty with mobility, needing assist with tasks such as standing, ambulating, rolling over in bed.  Pain in abdomen, weakness in trunk.  Patient will need further PT to regain functional independence.  Therapy Prognosis: Good  Decision Making: Medium Complexity  Clinical Presentation: evolving  Requires PT Follow-Up: Yes  Activity Tolerance  Activity Tolerance: Patient tolerated evaluation without incident;Patient limited by fatigue;Patient limited by  Frame for Short Term Goals: 14 visits  Short Term Goal 1: Sit to/from stand with supervision.  Short Term Goal 2: Ambulate 200' with walker with SBA  Short Term Goal 3: Supine to/from sit with SBA.       Education  Patient Education  Education Given To: Patient  Education Provided: Role of Therapy;Plan of Care;Precautions;Transfer Training;Fall Prevention Strategies  Education Method: Demonstration;Verbal  Education Outcome: Verbalized understanding;Demonstrated understanding      Therapy Time   Individual Concurrent Group Co-treatment   Time In 1435         Time Out 1515         Minutes 40         Timed Code Treatment Minutes: 25 Minutes       Anastasiia Cruz, PT

## 2024-03-07 NOTE — ANESTHESIA POSTPROCEDURE EVALUATION
Department of Anesthesiology  Postprocedure Note    Patient: Irais Max  MRN: [id number]  YOB: 1955  Date of evaluation: 3/7/2024    Procedure Summary       Date: 03/06/24 Room / Location: [address]    Anesthesia Start: 1336 Anesthesia Stop: 1735    Procedure: EXPLORATORY LAPAROTOMY, TRANSVERSE RESECTION OF TRANSVERSE COLON, EXPLANTATION OF OLD MESH, ABDOMINAL WASHOUT, WOUND VAC PLACEMENT Diagnosis:       Abdominal wall pain      (Abdominal wall pain [R10.9])    Surgeons: Christo Purdy DO Responsible Provider: Jamin Rahman MD    Anesthesia Type: General ASA Status: 3            Anesthesia Type: General    Patricio Phase I: Patricio Score: 10    Patricio Phase II:      Anesthesia Post Evaluation    Patient location during evaluation: PACU  Patient participation: complete - patient participated  Level of consciousness: awake and alert  Airway patency: patent  Nausea & Vomiting: no nausea and no vomiting  Cardiovascular status: blood pressure returned to baseline  Respiratory status: acceptable  Hydration status: euvolemic  Pain management: adequate    No notable events documented.

## 2024-03-07 NOTE — PROGRESS NOTES
[facility]  GENERAL SURGERY  PROGRESS NOTE      Patient Name: Irais Max  MRN: [id number]   Date of admission: 3/6/2024  Length of Stay: 1    Subjective:  Patient is seen and examined this morning.  No acute event overnight.  Patient complains of back pain as she has been laying down on the bed.  Otherwise, she denies of nausea or vomiting, abdominal pain is controlled.  Has not yet passed gas or bowel movement.  Has John intact    Vitals:                                                                            Temp  Av °F (37.2 °C)  Min: 98.4 °F (36.9 °C)  Max: 100.2 °F (37.9 °C)  Systolic (24hrs), Av , Min:101 , Max:159   Diastolic (24hrs), Av, Min:50, Max:96  Pulse  Av.2  Min: 81  Max: 109  Resp  Av.1  Min: 16  Max: 48  SpO2: 93 % SpO2  Av.2 %  Min: 91 %  Max: 100 %     I/O's  I/O last 3 completed shifts:  In: 3334.5 [P.O.:80; I.V.:3154.5; IV Piggyback:100]  Out: 1645 [Urine:1545; Blood:100]  Date 24 0000 - 24 2359   Shift 5846-9969 5690-7113 3819-8024 24 Hour Total   INTAKE   P.O.(mL/kg/hr) 80   80   I.V.(mL/kg) 899.4(9.9)   899.4(9.9)   IV Piggyback(mL/kg) 50(0.6)   50(0.6)   Shift Total(mL/kg) 1029.4(11.3)   1029.4(11.3)   OUTPUT   Urine(mL/kg/hr) 775   775   Shift Total(mL/kg) 775(8.5)   775(8.5)   Weight (kg) 90.7 90.7 90.7 90.7       Physical exam:  General: NAD, resting comfortably in bed.  Lungs: Equal chest rise bilaterally, no audible wheezes or rhonchi.  Heart: Regular rate and rhythm. Warm and well perfused.  Abdomen: Abdominal binder in place, wound VAC in place is working appropriately.  Abdomen soft, nondistended.  No rebound or guarding.  Extremities: Moves all extremities x4, No edema  MSK: Back pain    Labs:  CBC:  Recent Labs     24  0300   WBC 15.3*   RBC 4.40   HGB 12.4   HCT 41.8   MCV 95.0   MCH 28.2   MCHC 29.7   RDW 13.9      MPV 9.5       Chem:  Recent Labs     24  1317 24  0300   CREATININE 0.4*  --    MG  --

## 2024-03-07 NOTE — PROGRESS NOTES
Physician Progress Note      PATIENT:               DIANE NAVA  CSN #:                  [id number]  :                       1955  ADMIT DATE:       3/6/2024 11:31 AM  DISCH DATE:  RESPONDING  PROVIDER #:        Christo Purdy DO          QUERY TEXT:    Pt admitted with abdominal wall abscess/colocutaneous fistula and underwent   hernia repair with mesh on 24, s/p takedown transverse colocutaneous   fistula, explant of abdominal wall mesh x2.? If possible, please document in   progress notes and discharge summary the relationship if any between the   abdominal wall abscess/colocutaneous fistula and the surgery:    The medical record reflects the following:  Risk Factors: s/p hernia repair with mesh on 24  Clinical Indicators: per H&P \"presents today for exploratory laparotomy for   suspected colo or enterocutaneous fistula s/p robot incisional hernia repair   with IPOM. surgical history: hernia repair 24\", per brief op note \"Post-Op   Diagnosis: Abdominal wall abscess secondary to transverse colocutaneous   fistula, Fascial dehiscence, Early signs of fistulization of small bowel to   mesh x2.\"  Treatment: Procedure(s): Takedown transverse colocutaneous fistula, Takedown   of small bowel adhesions to abdominal wall mesh, Explant of abdominal wall   mesh x2, Abdominal washout, Placement of wound vac    Thank you, YIMI Owens,RN, [facility]  [phone]  [email or web address]  office hours [phone]  Options provided:  -- abdominal wall abscess/colocutaneous fistula is due to the procedure on   24  -- abdominal wall abscess/colocutaneous fistula is not due to the procedure,   but is due to other incidental risk factor, Please specify other incidental   risk factor.  -- Other - I will add my own diagnosis  -- Disagree - Not applicable / Not valid  -- Disagree - Clinically unable to determine / Unknown  -- Refer to Clinical Documentation Reviewer    PROVIDER RESPONSE TEXT:    Provider is clinically  unable to determine a response to this query.    Query created by: Urmila Logan on 3/7/2024 10:07 AM      Electronically signed by:  Christo Purdy DO 3/7/2024 10:32 AM

## 2024-03-07 NOTE — OP NOTE
portion of the mesh was then freed from its remaining attachments to the abdominal wall. Underneal the bioresorbable layer was the second mesh layer which had clearly caused a robust inflammatory reaction, there was a very small hole at midpoint which was where the colon defect was identified. The chronically inflamed overlying omentum was  from the transverse colotomy, this section of transverse colon was transected with ANU 75mm blue staple loads. The bowel ends were then brought together in a side to side functional end to end isoperistaltic arrangement and stapled anastomosis was created with ANU 75mm blue staple load x2. The staple line was oversewn in several spots with lembert 3-0 silk sutures. The bowel ends were anchored with 3-0 silk sutures. The mesenteric defect was closed with interrupted 3-0 silk sutures.     The bowel was run from Ligament of Treitz moving distally. There had been two distinct areas of small bowel that were found to be densely adherent to the mesh, this had been taken down on initial entry into the peritoneum. These areas were again inspected, there was no evidence of breakdown beyond the serosal layer, these areas were oversewn transversely with interrupted 3-0 silk sutures in lembert fashion. The remainder of the small bowel appeared normal. The peritoneal cavity was irrigated multiple times with irrisept solution which was allowed to dwell for several minutes before being washed out with sterile saline.     Attention was turned to the abdominal wall. The scarred and otherwise ischemic tissues around the abscess cavity (subcutaneous tissues) and fascial dehiscence (including muscle/fascia of the abdominal wall) were cut away until healthy punctate bleeding was encountered. The tissues overlying the anterior fascia were dissected away for 10cm in all directions in order to facilitate fascial closure, the edges were able to be brought to the midline with minimal tension. NGT  was checked and found to be in appropriate position. I again checked the anastomosis, common channel was found to be patent and generous, staple line without evidence of ischemia or leakage. The adjacent epiploic fat and omentum were tacked down over the anastomosis with silk sutures for additional reinforcement.     The fascia was then closed with interrupted figure of eight sutures of #1-looped PDS sutures. This was further reinforced with running sutures of 0-PDS stratafix suture. The tissues were again irrigated with irrisept and saline then black vac foam was placed in the abdominal wall and covered with vac dressings, connection to suction demonstrated no leaks.      All sponge needle and instrument counts were correct at the end of the case. The patient tolerated the procedure well without complications. She  was successfully extubated and taken to PACU for further recovery.      Electronically signed by Christo Purdy DO on 3/7/2024 at 9:07 AM

## 2024-03-08 PROBLEM — G89.18 POST-OPERATIVE PAIN: Status: RESOLVED | Noted: 2024-03-06 | Resolved: 2024-03-08

## 2024-03-08 PROBLEM — K63.2 COLOCUTANEOUS FISTULA: Status: ACTIVE | Noted: 2024-03-08

## 2024-03-08 LAB
ANION GAP SERPL CALCULATED.3IONS-SCNC: 8 MMOL/L (ref 9–16)
BASOPHILS # BLD: 0.17 K/UL (ref 0–0.2)
BASOPHILS NFR BLD: 1 % (ref 0–2)
BUN SERPL-MCNC: 8 MG/DL (ref 8–23)
CALCIUM SERPL-MCNC: 10.2 MG/DL (ref 8.6–10.4)
CHLORIDE SERPL-SCNC: 101 MMOL/L (ref 98–107)
CO2 SERPL-SCNC: 27 MMOL/L (ref 20–31)
CREAT SERPL-MCNC: 0.4 MG/DL (ref 0.5–0.9)
EOSINOPHIL # BLD: 1.02 K/UL (ref 0–0.4)
EOSINOPHILS RELATIVE PERCENT: 6 % (ref 1–4)
ERYTHROCYTE [DISTWIDTH] IN BLOOD BY AUTOMATED COUNT: 14 % (ref 11.8–14.4)
GFR SERPL CREATININE-BSD FRML MDRD: >60 ML/MIN/1.73M2
GLUCOSE SERPL-MCNC: 111 MG/DL (ref 74–99)
HCT VFR BLD AUTO: 39 % (ref 36.3–47.1)
HGB BLD-MCNC: 11.1 G/DL (ref 11.9–15.1)
IMM GRANULOCYTES # BLD AUTO: 0 K/UL (ref 0–0.3)
IMM GRANULOCYTES NFR BLD: 0 %
LYMPHOCYTES NFR BLD: 1.53 K/UL (ref 1–4.8)
LYMPHOCYTES RELATIVE PERCENT: 9 % (ref 24–44)
MCH RBC QN AUTO: 28.2 PG (ref 25.2–33.5)
MCHC RBC AUTO-ENTMCNC: 28.5 G/DL (ref 28.4–34.8)
MCV RBC AUTO: 99 FL (ref 82.6–102.9)
MONOCYTES NFR BLD: 0.85 K/UL (ref 0.1–0.8)
MONOCYTES NFR BLD: 5 % (ref 1–7)
MORPHOLOGY: NORMAL
NEUTROPHILS NFR BLD: 79 % (ref 36–66)
NEUTS SEG NFR BLD: 13.43 K/UL (ref 1.8–7.7)
NRBC BLD-RTO: 0 PER 100 WBC
PLATELET # BLD AUTO: 273 K/UL (ref 138–453)
PMV BLD AUTO: 9.8 FL (ref 8.1–13.5)
POTASSIUM SERPL-SCNC: 4.3 MMOL/L (ref 3.7–5.3)
RBC # BLD AUTO: 3.94 M/UL (ref 3.95–5.11)
SODIUM SERPL-SCNC: 136 MMOL/L (ref 136–145)
WBC OTHER # BLD: 17 K/UL (ref 3.5–11.3)

## 2024-03-08 PROCEDURE — 6360000002 HC RX W HCPCS: Performed by: SURGERY

## 2024-03-08 PROCEDURE — 6370000000 HC RX 637 (ALT 250 FOR IP): Performed by: SURGERY

## 2024-03-08 PROCEDURE — 85025 COMPLETE CBC W/AUTO DIFF WBC: CPT

## 2024-03-08 PROCEDURE — 97606 NEG PRS WND THER DME>50 SQCM: CPT

## 2024-03-08 PROCEDURE — 6370000000 HC RX 637 (ALT 250 FOR IP)

## 2024-03-08 PROCEDURE — 2700000000 HC OXYGEN THERAPY PER DAY

## 2024-03-08 PROCEDURE — 99024 POSTOP FOLLOW-UP VISIT: CPT | Performed by: SURGERY

## 2024-03-08 PROCEDURE — 36415 COLL VENOUS BLD VENIPUNCTURE: CPT

## 2024-03-08 PROCEDURE — 94761 N-INVAS EAR/PLS OXIMETRY MLT: CPT

## 2024-03-08 PROCEDURE — 2060000000 HC ICU INTERMEDIATE R&B

## 2024-03-08 PROCEDURE — 80048 BASIC METABOLIC PNL TOTAL CA: CPT

## 2024-03-08 PROCEDURE — 97110 THERAPEUTIC EXERCISES: CPT

## 2024-03-08 PROCEDURE — 2580000003 HC RX 258: Performed by: SURGERY

## 2024-03-08 PROCEDURE — 94640 AIRWAY INHALATION TREATMENT: CPT

## 2024-03-08 PROCEDURE — 97530 THERAPEUTIC ACTIVITIES: CPT

## 2024-03-08 RX ORDER — HYDROCODONE BITARTRATE AND ACETAMINOPHEN 5; 325 MG/1; MG/1
1 TABLET ORAL EVERY 4 HOURS PRN
Status: DISCONTINUED | OUTPATIENT
Start: 2024-03-08 | End: 2024-03-16 | Stop reason: HOSPADM

## 2024-03-08 RX ORDER — HYDROCODONE BITARTRATE AND ACETAMINOPHEN 5; 325 MG/1; MG/1
2 TABLET ORAL EVERY 4 HOURS PRN
Status: DISCONTINUED | OUTPATIENT
Start: 2024-03-08 | End: 2024-03-16 | Stop reason: HOSPADM

## 2024-03-08 RX ADMIN — CLINDAMYCIN IN 5 PERCENT DEXTROSE 300 MG: 6 INJECTION, SOLUTION INTRAVENOUS at 08:37

## 2024-03-08 RX ADMIN — CYCLOBENZAPRINE HYDROCHLORIDE 5 MG: 5 TABLET, FILM COATED ORAL at 17:22

## 2024-03-08 RX ADMIN — Medication 2000 MG: at 22:13

## 2024-03-08 RX ADMIN — TIOTROPIUM BROMIDE INHALATION SPRAY 2 PUFF: 3.12 SPRAY, METERED RESPIRATORY (INHALATION) at 10:30

## 2024-03-08 RX ADMIN — SODIUM CHLORIDE, PRESERVATIVE FREE 10 ML: 5 INJECTION INTRAVENOUS at 20:06

## 2024-03-08 RX ADMIN — PHENOL 1 SPRAY: 1.4 SPRAY ORAL at 02:35

## 2024-03-08 RX ADMIN — LOSARTAN POTASSIUM 25 MG: 50 TABLET, FILM COATED ORAL at 20:08

## 2024-03-08 RX ADMIN — MORPHINE SULFATE 4 MG: 4 INJECTION INTRAVENOUS at 02:27

## 2024-03-08 RX ADMIN — HYDROCODONE BITARTRATE AND ACETAMINOPHEN 1 TABLET: 5; 325 TABLET ORAL at 23:48

## 2024-03-08 RX ADMIN — METRONIDAZOLE 500 MG: 500 INJECTION, SOLUTION INTRAVENOUS at 18:28

## 2024-03-08 RX ADMIN — BUDESONIDE AND FORMOTEROL FUMARATE DIHYDRATE 2 PUFF: 160; 4.5 AEROSOL RESPIRATORY (INHALATION) at 10:30

## 2024-03-08 RX ADMIN — PHENOL 1 SPRAY: 1.4 SPRAY ORAL at 06:50

## 2024-03-08 RX ADMIN — SODIUM CHLORIDE, POTASSIUM CHLORIDE, SODIUM LACTATE AND CALCIUM CHLORIDE: 600; 310; 30; 20 INJECTION, SOLUTION INTRAVENOUS at 02:26

## 2024-03-08 RX ADMIN — CYCLOBENZAPRINE HYDROCHLORIDE 5 MG: 5 TABLET, FILM COATED ORAL at 08:31

## 2024-03-08 RX ADMIN — HYDROCODONE BITARTRATE AND ACETAMINOPHEN 1 TABLET: 5; 325 TABLET ORAL at 17:22

## 2024-03-08 RX ADMIN — MORPHINE SULFATE 4 MG: 4 INJECTION INTRAVENOUS at 10:48

## 2024-03-08 RX ADMIN — ACETAMINOPHEN 650 MG: 325 TABLET ORAL at 06:46

## 2024-03-08 ASSESSMENT — PAIN SCALES - GENERAL
PAINLEVEL_OUTOF10: 0
PAINLEVEL_OUTOF10: 6
PAINLEVEL_OUTOF10: 5
PAINLEVEL_OUTOF10: 4
PAINLEVEL_OUTOF10: 6
PAINLEVEL_OUTOF10: 4

## 2024-03-08 ASSESSMENT — PAIN DESCRIPTION - LOCATION
LOCATION: THROAT
LOCATION: THROAT
LOCATION: BACK
LOCATION: THROAT
LOCATION: BACK
LOCATION: BACK

## 2024-03-08 ASSESSMENT — PAIN DESCRIPTION - ORIENTATION
ORIENTATION: LOWER;MID
ORIENTATION: MID
ORIENTATION: LOWER;MID
ORIENTATION: MID

## 2024-03-08 ASSESSMENT — PAIN DESCRIPTION - DESCRIPTORS
DESCRIPTORS: BURNING;ACHING;SORE
DESCRIPTORS: ACHING
DESCRIPTORS: ACHING;DULL;NAGGING
DESCRIPTORS: ACHING

## 2024-03-08 ASSESSMENT — PAIN DESCRIPTION - FREQUENCY
FREQUENCY: CONTINUOUS
FREQUENCY: CONTINUOUS

## 2024-03-08 ASSESSMENT — PAIN - FUNCTIONAL ASSESSMENT: PAIN_FUNCTIONAL_ASSESSMENT: PREVENTS OR INTERFERES SOME ACTIVE ACTIVITIES AND ADLS

## 2024-03-08 NOTE — CARE COORDINATION
Transitional planning      Writer to room to discuss d/c,  plan is SNF vs ARU, provided both lists, has wound vac, NG.

## 2024-03-08 NOTE — DISCHARGE INSTR - COC
Continuity of Care Form    Patient Name: Irais Max   :  1955  MRN:  [id number]    Admit date:  3/6/2024  Discharge date:  3/15/2024    Code Status Order: Full Code   Advance Directives:   Advance Care Flowsheet Documentation       Date/Time Healthcare Directive Type of Healthcare Directive Copy in Chart Healthcare Agent Appointed Healthcare Agent's Name Healthcare Agent's Phone Number    24 1234 No, patient does not have an advance directive for healthcare treatment -- -- -- -- --            Admitting Physician:  Christo Purdy DO  PCP: Carie Jones DO    Discharging Nurse: [facility] Unit/Room#: 0446/0446-01  Discharging Unit Phone Number: [phone]    Emergency Contact:   Extended Emergency Contact Information  Primary Emergency Contact: Wero Velazquez  Address: [address] [place] [address] of [facility]  Home Phone: [phone]  Work Phone: [phone]  Mobile Phone: [phone]  Relation: Child    Past Surgical History:  Past Surgical History:   Procedure Laterality Date     SECTION  1976    EXPLORATORY LAPAROTOMY W/ BOWEL RESECTION  2024    EXPLORATORY LAPAROTOMY, BOWEL RESECTION    HERNIA REPAIR N/A 2024    XI ROBOTIC LAPAROSCOPIC VENTRAL HERNIA REPAIR WITH MESH, EXPLANTATION OF OLD MESH, LYSIS OF ADHESIONS performed by Christo Purdy DO at [facility] OR    HYSTERECTOMY (CERVIX STATUS UNKNOWN)      with BSO    LAPAROTOMY N/A 3/6/2024    EXPLORATORY LAPAROTOMY, TRANSVERSE RESECTION OF TRANSVERSE COLON, EXPLANTATION OF OLD MESH, ABDOMINAL WASHOUT, WOUND VAC PLACEMENT performed by Christo Purdy DO at [facility] OR    TUBAL LIGATION      with c section    VENTRAL HERNIA REPAIR      x 2 prior to 2024       Immunization History:   Immunization History   Administered Date(s) Administered    COVID-19, MODERNA BLUE border, Primary or Immunocompromised, (age 12y+), IM, 100 mcg/0.5mL 2021, 04/15/2021    COVID-19,

## 2024-03-08 NOTE — PROGRESS NOTES
[facility] General Surgery Progress Note            PATIENT NAME: Irais Max     TODAY'S DATE: 3/8/2024, 6:29 AM    CC: colocutaneous fistula    SUBJECTIVE:    Pt seen and examined. No acute events overnight, Alvaro DCed yesterday, pt has been tolerating ice chips and denies abdominal bloating or nausea. No other complaints at this time.    OBJECTIVE:   Vitals:  BP (!) 138/51   Pulse 87   Temp 97.8 °F (36.6 °C) (Oral)   Resp 20   Ht 1.524 m (5')   Wt 94 kg (207 lb 3.7 oz)   SpO2 97%   BMI 40.47 kg/m²    TEMPERATURE:  Current - Temp: 97.8 °F (36.6 °C); Max - Temp  Av °F (36.7 °C)  Min: 97.5 °F (36.4 °C)  Max: 99 °F (37.2 °C)    INTAKE/OUTPUT:      Intake/Output Summary (Last 24 hours) at 3/8/2024 0629  Last data filed at 3/8/2024 0600  Gross per 24 hour   Intake 2648.95 ml   Output 425 ml   Net 2223.95 ml                 General: AOx3, NAD  Lungs: Symmetrical chest rise bilaterally, no audible wheezes or rhonchi  Heart: S1S2  Abdomen: Soft, ND, vac intact with minimal SS drainage in collection bin.  Extremity: moves all extremities x4, No edema      Data Review:  CBC:   Recent Labs     24  0300   WBC 15.3*   HGB 12.4        BMP:    Recent Labs     24  1317   CREATININE 0.4*     Hepatic: No results for input(s): \"AST\", \"ALT\", \"ALB\", \"ALKPHOS\", \"BILITOT\", \"BILIDIR\" in the last 72 hours.  Coagulation: No results for input(s): \"APTT\", \"PROT\", \"INR\" in the last 72 hours.          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 Suction

## 2024-03-08 NOTE — PLAN OF CARE
Problem: Discharge Planning  Goal: Discharge to home or other facility with appropriate resources  Outcome: Progressing     Problem: Chronic Conditions and Co-morbidities  Goal: Patient's chronic conditions and co-morbidity symptoms are monitored and maintained or improved  Outcome: Progressing     Problem: Pain  Goal: Verbalizes/displays adequate comfort level or baseline comfort level  Outcome: Progressing     Problem: Safety - Adult  Goal: Free from fall injury  Outcome: Progressing     Problem: Respiratory - Adult  Goal: Achieves optimal ventilation and oxygenation  3/8/2024 1744 by Martha Layne RN  Outcome: Progressing  3/8/2024 1044 by Nieves Huerta RCP  Outcome: Progressing

## 2024-03-08 NOTE — PROGRESS NOTES
[facility] Wound Ostomy  Nurse  Consult Note       NAME:  Irais Max  MEDICAL RECORD NUMBER:  [id number]  AGE: 68 y.o.   GENDER: female  : 1955  TODAY'S DATE:  3/8/2024    Subjective   Reason for WOC Nurse Evaluation and Assessment:   NPWT dressing changes to abdominal incision.      Irais Max is a 68 y.o. female referred by:   [x] Physician  Dr. Purdy  [] Nursing  [] Other:     Wound Identification:  Wound Type:  surgical  Contributing Factors: diabetes, obesity, and decreased tissue oxygenation    Wound History:   3/6/2024 : Exploratory laparotomy, Takedown transverse colocutaneous fistula, Takedown of small bowel adhesions to abdominal wall mesh  Explant of abdominal wall mesh x2, Muscle fascia debridement ~20 sq cm, placement of wound vac >50sqcm with  for abdominal wall abscess secondary to transverse colocutaneous fistula, fascial dehiscence, early signs of fistulization of small bowel to mesh x2  Current Wound Care Treatment:  NPWT at -125 mmHg    Patient Goal of Care:  [x] Wound Healing  [] Odor Control  [] Palliative Care  [] Pain Control   [] Other:         PAST MEDICAL HISTORY        Diagnosis Date    Abdominal pain 2024    with feve and malaise , CT scan done and Dr. Purdy recommended immediate surgery but pt refused, see notes    Anxiety     Asthma     COPD (chronic obstructive pulmonary disease) (HCC)     COVID-19 vaccine series completed     Diabetes mellitus (HCC)     Full dentures     upper and lower, does not want anyone to see her without her dentures in    Goiter     Hyperlipidemia     Hyperparathyroidism (HCC)     Hypertension     Lives alone     Obesity     Oxygen dependent     2L / NC prn    Recurrent ventral hernia 2023    Sleep apnea     CPAP    Under care of team     Pulmonologist,   Dr. Crawford, last seen 10/31/2023, clearance on chart, high risk    Under care of team     Gen Surg, Dr. Purdy, last seen 2024    Wears glasses     Wellness examination     PCP,   Redistribution  [] Fluid Immersion  [] Bariatric  [] Total Pressure Relief  [] Other:     Current Diet: ADULT DIET; Clear Liquid; No Caffeine, No red dye, No Carbonated Beverages    Discharge Plan:  Placement for patient upon discharge: TBD  home vs SNF/Rehab    Patient appropriate for Outpatient Wound Care Center: Yes as determined by surgeon      Patient/Caregiver Teaching:  Level of patient/caregiver understanding able to:   [] Indicates understanding       [] Needs reinforcement  [] Unsuccessful      [x] Verbal Understanding  [] Demonstrated understanding       [] No evidence of learning  [] Refused teaching         [] N/A     TOM PARTIDA RN BSN CWON

## 2024-03-08 NOTE — PROGRESS NOTES
Physical Therapy  Facility/Department: [address] ONC/MED SURG  Daily Treatment Note    Name: Irais Max  : 1955  MRN: [id number]  Date of Service: 3/8/2024    Discharge Recommendations:  Patient would benefit from continued therapy after discharge          Patient Diagnosis(es): The encounter diagnosis was Abdominal wall pain.  Past Medical History:  has a past medical history of Abdominal pain, Anxiety, Asthma, COPD (chronic obstructive pulmonary disease) ([facility]), COVID-19 vaccine series completed, Diabetes mellitus ([facility]), Full dentures, Goiter, Hyperlipidemia, Hyperparathyroidism ([facility]), Hypertension, Lives alone, Obesity, Oxygen dependent, Recurrent ventral hernia, Sleep apnea, Under care of team, Under care of team, Wears glasses, and Wellness examination.  Past Surgical History:  has a past surgical history that includes Hysterectomy ();  section (); ventral hernia repair; Tubal ligation (); hernia repair (N/A, 2024); Exploratory laparotomy w/ bowel resection (2024); and laparotomy (N/A, 3/6/2024).    Assessment   Body Structures, Functions, Activity Limitations Requiring Skilled Therapeutic Intervention: Decreased functional mobility ;Increased pain;Decreased strength;Decreased balance  Assessment: Pt ambulated 60 ft with RW and CGA. Pt required CGA for sit<>stands from toilet and EOB. Pt would benefit from PT following d/c to facilitate a return to prior level of function.  Therapy Prognosis: Good  Activity Tolerance  Activity Tolerance: Patient tolerated evaluation without incident;Patient limited by fatigue;Patient limited by pain     Plan   Physical Therapy Plan  General Plan:  (5-6x/wk)  Current Treatment Recommendations: Strengthening, Gait training, Stair training, Functional mobility training, Transfer training, Safety education & training, Patient/Caregiver education & training, Pain management, Home exercise program, Therapeutic activities  Safety Devices  Type of

## 2024-03-09 LAB
ANION GAP SERPL CALCULATED.3IONS-SCNC: 9 MMOL/L (ref 9–17)
BASOPHILS # BLD: 0 K/UL (ref 0–0.2)
BASOPHILS NFR BLD: 0 % (ref 0–2)
BUN SERPL-MCNC: 7 MG/DL (ref 8–23)
CALCIUM SERPL-MCNC: 9.8 MG/DL (ref 8.6–10.4)
CHLORIDE SERPL-SCNC: 98 MMOL/L (ref 98–107)
CO2 SERPL-SCNC: 28 MMOL/L (ref 20–31)
CREAT SERPL-MCNC: 0.4 MG/DL (ref 0.5–0.9)
EOSINOPHIL # BLD: 1.22 K/UL (ref 0–0.4)
EOSINOPHILS RELATIVE PERCENT: 7 % (ref 1–4)
ERYTHROCYTE [DISTWIDTH] IN BLOOD BY AUTOMATED COUNT: 13.9 % (ref 11.8–14.4)
GFR SERPL CREATININE-BSD FRML MDRD: >60 ML/MIN/1.73M2
GLUCOSE SERPL-MCNC: 97 MG/DL (ref 70–99)
HCT VFR BLD AUTO: 36.8 % (ref 36.3–47.1)
HGB BLD-MCNC: 10.9 G/DL (ref 11.9–15.1)
IMM GRANULOCYTES # BLD AUTO: 0.7 K/UL (ref 0–0.3)
IMM GRANULOCYTES NFR BLD: 4 %
LYMPHOCYTES NFR BLD: 0.52 K/UL (ref 1–4.8)
LYMPHOCYTES RELATIVE PERCENT: 3 % (ref 24–44)
MCH RBC QN AUTO: 28 PG (ref 25.2–33.5)
MCHC RBC AUTO-ENTMCNC: 29.6 G/DL (ref 28.4–34.8)
MCV RBC AUTO: 94.6 FL (ref 82.6–102.9)
MICROORGANISM SPEC CULT: ABNORMAL
MICROORGANISM/AGENT SPEC: ABNORMAL
MONOCYTES NFR BLD: 0.87 K/UL (ref 0.1–0.8)
MONOCYTES NFR BLD: 5 % (ref 1–7)
MORPHOLOGY: NORMAL
NEUTROPHILS NFR BLD: 81 % (ref 36–66)
NEUTS SEG NFR BLD: 14.09 K/UL (ref 1.8–7.7)
NRBC BLD-RTO: 0 PER 100 WBC
PLATELET # BLD AUTO: 295 K/UL (ref 138–453)
PMV BLD AUTO: 9.5 FL (ref 8.1–13.5)
POTASSIUM SERPL-SCNC: 4 MMOL/L (ref 3.7–5.3)
RBC # BLD AUTO: 3.89 M/UL (ref 3.95–5.11)
SODIUM SERPL-SCNC: 135 MMOL/L (ref 135–144)
SPECIMEN DESCRIPTION: ABNORMAL
WBC OTHER # BLD: 17.4 K/UL (ref 3.5–11.3)

## 2024-03-09 PROCEDURE — 94640 AIRWAY INHALATION TREATMENT: CPT

## 2024-03-09 PROCEDURE — 85025 COMPLETE CBC W/AUTO DIFF WBC: CPT

## 2024-03-09 PROCEDURE — 2060000000 HC ICU INTERMEDIATE R&B

## 2024-03-09 PROCEDURE — 6370000000 HC RX 637 (ALT 250 FOR IP): Performed by: SURGERY

## 2024-03-09 PROCEDURE — 6360000002 HC RX W HCPCS: Performed by: SURGERY

## 2024-03-09 PROCEDURE — 36415 COLL VENOUS BLD VENIPUNCTURE: CPT

## 2024-03-09 PROCEDURE — 2580000003 HC RX 258: Performed by: SURGERY

## 2024-03-09 PROCEDURE — 94761 N-INVAS EAR/PLS OXIMETRY MLT: CPT

## 2024-03-09 PROCEDURE — 80048 BASIC METABOLIC PNL TOTAL CA: CPT

## 2024-03-09 PROCEDURE — 2700000000 HC OXYGEN THERAPY PER DAY

## 2024-03-09 RX ADMIN — BUDESONIDE AND FORMOTEROL FUMARATE DIHYDRATE 2 PUFF: 160; 4.5 AEROSOL RESPIRATORY (INHALATION) at 20:39

## 2024-03-09 RX ADMIN — HYDROCODONE BITARTRATE AND ACETAMINOPHEN 1 TABLET: 5; 325 TABLET ORAL at 17:48

## 2024-03-09 RX ADMIN — Medication 2000 MG: at 13:30

## 2024-03-09 RX ADMIN — METRONIDAZOLE 500 MG: 500 INJECTION, SOLUTION INTRAVENOUS at 09:31

## 2024-03-09 RX ADMIN — LOSARTAN POTASSIUM 25 MG: 50 TABLET, FILM COATED ORAL at 20:34

## 2024-03-09 RX ADMIN — BUDESONIDE AND FORMOTEROL FUMARATE DIHYDRATE 2 PUFF: 160; 4.5 AEROSOL RESPIRATORY (INHALATION) at 08:10

## 2024-03-09 RX ADMIN — HYDROCODONE BITARTRATE AND ACETAMINOPHEN 2 TABLET: 5; 325 TABLET ORAL at 06:19

## 2024-03-09 RX ADMIN — METRONIDAZOLE 500 MG: 500 INJECTION, SOLUTION INTRAVENOUS at 17:52

## 2024-03-09 RX ADMIN — Medication 2000 MG: at 21:47

## 2024-03-09 RX ADMIN — SODIUM CHLORIDE: 9 INJECTION, SOLUTION INTRAVENOUS at 02:44

## 2024-03-09 RX ADMIN — METRONIDAZOLE 500 MG: 500 INJECTION, SOLUTION INTRAVENOUS at 02:44

## 2024-03-09 RX ADMIN — HYDROCODONE BITARTRATE AND ACETAMINOPHEN 1 TABLET: 5; 325 TABLET ORAL at 13:30

## 2024-03-09 RX ADMIN — Medication 2000 MG: at 06:12

## 2024-03-09 RX ADMIN — TIOTROPIUM BROMIDE INHALATION SPRAY 2 PUFF: 3.12 SPRAY, METERED RESPIRATORY (INHALATION) at 08:10

## 2024-03-09 RX ADMIN — CYCLOBENZAPRINE HYDROCHLORIDE 5 MG: 5 TABLET, FILM COATED ORAL at 09:29

## 2024-03-09 RX ADMIN — CYCLOBENZAPRINE HYDROCHLORIDE 5 MG: 5 TABLET, FILM COATED ORAL at 17:49

## 2024-03-09 ASSESSMENT — PAIN DESCRIPTION - LOCATION
LOCATION: ABDOMEN;BACK
LOCATION: ABDOMEN;BACK

## 2024-03-09 ASSESSMENT — PAIN DESCRIPTION - ORIENTATION: ORIENTATION: MID

## 2024-03-09 ASSESSMENT — PAIN SCALES - GENERAL
PAINLEVEL_OUTOF10: 4
PAINLEVEL_OUTOF10: 7
PAINLEVEL_OUTOF10: 6
PAINLEVEL_OUTOF10: 0

## 2024-03-09 ASSESSMENT — PAIN DESCRIPTION - DESCRIPTORS: DESCRIPTORS: ACHING

## 2024-03-09 NOTE — PROGRESS NOTES
[facility]  PROGRESS NOTE    Shift date: 03/09/2024  Shift day: Saturday   Shift # 1    Room # 0446/0446-01   Name: Irais Max                Holiness: [employer]ist   Place of Evangelical: unk    Referral: Routine Visit    Admit Date & Time: 3/6/2024 11:31 AM    Assessment:  Irais Max is a 68 y.o. female in the hospital because of a colocutaneous fistula. Upon entering the room writer observes pt in recliner chair, prepping for some rest. Pt was wanting the lights out in her room.      Intervention:  Writer introduced self and title as  Writer offered space for pt  to express feelings, needs, and concerns and provided a ministry presence. Pt expressed concern that a  would visit, writer assured her it was routine and nothing to be worried about.   Outcome:  With the help of the nurse, we figured out the light switches so she could get some rest.    Plan:  Chaplains will remain available to offer spiritual and emotional support as needed.      Electronically signed by Ayana Chan, Intern, on 3/9/2024 at 10:36 AM.  [facility]  [phone]

## 2024-03-09 NOTE — PLAN OF CARE
Problem: Respiratory - Adult  Goal: Achieves optimal ventilation and oxygenation  3/9/2024 0821 by Nieves Huerta, MAISHA  Outcome: Progressing   BRONCHOSPASM/BRONCHOCONSTRICTION     [x]         IMPROVE AERATION/BREATH SOUNDS  [x]   ADMINISTER BRONCHODILATOR THERAPY AS APPROPRIATE  [x]   ASSESS BREATH SOUNDS  []   IMPLEMENT AEROSOL/MDI PROTOCOL  [x]   PATIENT EDUCATION AS NEEDED

## 2024-03-09 NOTE — PLAN OF CARE
Problem: Discharge Planning  Goal: Discharge to home or other facility with appropriate resources  3/9/2024 0042 by Lesli Munguia RN  Outcome: Progressing     Problem: Chronic Conditions and Co-morbidities  Goal: Patient's chronic conditions and co-morbidity symptoms are monitored and maintained or improved  3/9/2024 0042 by Lesli Munguia RN  Outcome: Progressing     Problem: Pain  Goal: Verbalizes/displays adequate comfort level or baseline comfort level  3/9/2024 0042 by Lesli Munguia RN  Outcome: Progressing     Problem: Safety - Adult  Goal: Free from fall injury  3/9/2024 0042 by Lesli Munguia RN  Outcome: Progressing     Problem: Respiratory - Adult  Goal: Achieves optimal ventilation and oxygenation  3/9/2024 0042 by Lesli Munguia, RN  Outcome: Progressing

## 2024-03-09 NOTE — PROGRESS NOTES
[facility] General Surgery Progress Note            PATIENT NAME: Irais Max     TODAY'S DATE: 3/9/2024, 8:17 AM    CC: colocutaneous fistula    SUBJECTIVE:    Pt seen and examined. No acute events overnight, Tolerating liquids without nausea or emesis. No bowel function. Wound Vac changed yesterday. NG out.    OBJECTIVE:   Vitals:  BP (!) 132/55   Pulse 78   Temp 99 °F (37.2 °C) (Oral)   Resp 28   Ht 1.524 m (5')   Wt 101 kg (222 lb 10.6 oz)   SpO2 94%   BMI 43.49 kg/m²    TEMPERATURE:  Current - Temp: 99 °F (37.2 °C); Max - Temp  Av.3 °F (36.8 °C)  Min: 97.9 °F (36.6 °C)  Max: 99 °F (37.2 °C)    INTAKE/OUTPUT:    No intake or output data in the 24 hours ending 24 0817                General: AOx3, NAD  Lungs: Symmetrical chest rise bilaterally, no audible wheezes or rhonchi  Heart: S1S2  Abdomen: Soft, ND, vac intact with minimal SS drainage in collection bin.  Extremity: moves all extremities x4, No edema      Data Review:  CBC:   Recent Labs     24  0300 24  0541 24  0649   WBC 15.3* 17.0* 17.4*   HGB 12.4 11.1* 10.9*    273 295       BMP:    Recent Labs     24  1317 24  0541   NA  --  136   K  --  4.3   CL  --  101   CO2  --  27   BUN  --  8   CREATININE 0.4* 0.4*   GLUCOSE  --  111*       Hepatic: No results for input(s): \"AST\", \"ALT\", \"ALB\", \"ALKPHOS\", \"BILITOT\", \"BILIDIR\" in the last 72 hours.  Coagulation: No results for input(s): \"APTT\", \"PROT\", \"INR\" in the last 72 hours.          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

## 2024-03-09 NOTE — PLAN OF CARE
Problem: Respiratory - Adult  Goal: Achieves optimal ventilation and oxygenation  3/9/2024 0421 by Molly King, MAISHA  Outcome: Progressing

## 2024-03-09 NOTE — PLAN OF CARE
Problem: Discharge Planning  Goal: Discharge to home or other facility with appropriate resources  Outcome: Progressing     Problem: Chronic Conditions and Co-morbidities  Goal: Patient's chronic conditions and co-morbidity symptoms are monitored and maintained or improved  Outcome: Progressing     Problem: Pain  Goal: Verbalizes/displays adequate comfort level or baseline comfort level  Outcome: Progressing     Problem: Safety - Adult  Goal: Free from fall injury  Outcome: Progressing     Problem: Respiratory - Adult  Goal: Achieves optimal ventilation and oxygenation  3/9/2024 1553 by Martha Layne RN  Outcome: Progressing  3/9/2024 0821 by Nieves Huerta RCP  Outcome: Progressing  3/9/2024 0421 by Molly King RCP  Outcome: Progressing

## 2024-03-10 LAB
ANION GAP SERPL CALCULATED.3IONS-SCNC: 12 MMOL/L (ref 9–17)
BASOPHILS # BLD: 0 K/UL (ref 0–0.2)
BASOPHILS NFR BLD: 0 % (ref 0–2)
BUN SERPL-MCNC: 6 MG/DL (ref 8–23)
CALCIUM SERPL-MCNC: 9.6 MG/DL (ref 8.6–10.4)
CHLORIDE SERPL-SCNC: 99 MMOL/L (ref 98–107)
CO2 SERPL-SCNC: 25 MMOL/L (ref 20–31)
CREAT SERPL-MCNC: 0.4 MG/DL (ref 0.5–0.9)
EOSINOPHIL # BLD: 1.25 K/UL (ref 0–0.4)
EOSINOPHILS RELATIVE PERCENT: 8 % (ref 1–4)
ERYTHROCYTE [DISTWIDTH] IN BLOOD BY AUTOMATED COUNT: 13.8 % (ref 11.8–14.4)
GFR SERPL CREATININE-BSD FRML MDRD: >60 ML/MIN/1.73M2
GLUCOSE SERPL-MCNC: 92 MG/DL (ref 70–99)
HCT VFR BLD AUTO: 37.3 % (ref 36.3–47.1)
HGB BLD-MCNC: 11.1 G/DL (ref 11.9–15.1)
IMM GRANULOCYTES # BLD AUTO: 1.25 K/UL (ref 0–0.3)
IMM GRANULOCYTES NFR BLD: 8 %
LYMPHOCYTES NFR BLD: 1.25 K/UL (ref 1–4.8)
LYMPHOCYTES RELATIVE PERCENT: 8 % (ref 24–44)
MCH RBC QN AUTO: 28.5 PG (ref 25.2–33.5)
MCHC RBC AUTO-ENTMCNC: 29.8 G/DL (ref 28.4–34.8)
MCV RBC AUTO: 95.6 FL (ref 82.6–102.9)
MONOCYTES NFR BLD: 0.78 K/UL (ref 0.1–0.8)
MONOCYTES NFR BLD: 5 % (ref 1–7)
MORPHOLOGY: NORMAL
NEUTROPHILS NFR BLD: 71 % (ref 36–66)
NEUTS SEG NFR BLD: 11.07 K/UL (ref 1.8–7.7)
NRBC BLD-RTO: 0 PER 100 WBC
PLATELET # BLD AUTO: 284 K/UL (ref 138–453)
PLATELET, FLUORESCENCE: 284 K/UL (ref 138–453)
PMV BLD AUTO: 9.8 FL (ref 8.1–13.5)
POTASSIUM SERPL-SCNC: 3.7 MMOL/L (ref 3.7–5.3)
RBC # BLD AUTO: 3.9 M/UL (ref 3.95–5.11)
SODIUM SERPL-SCNC: 136 MMOL/L (ref 135–144)
WBC OTHER # BLD: 15.6 K/UL (ref 3.5–11.3)

## 2024-03-10 PROCEDURE — 36415 COLL VENOUS BLD VENIPUNCTURE: CPT

## 2024-03-10 PROCEDURE — 2580000003 HC RX 258: Performed by: SURGERY

## 2024-03-10 PROCEDURE — 6360000002 HC RX W HCPCS: Performed by: SURGERY

## 2024-03-10 PROCEDURE — 80048 BASIC METABOLIC PNL TOTAL CA: CPT

## 2024-03-10 PROCEDURE — 6370000000 HC RX 637 (ALT 250 FOR IP): Performed by: SURGERY

## 2024-03-10 PROCEDURE — 85025 COMPLETE CBC W/AUTO DIFF WBC: CPT

## 2024-03-10 PROCEDURE — 2700000000 HC OXYGEN THERAPY PER DAY

## 2024-03-10 PROCEDURE — 6370000000 HC RX 637 (ALT 250 FOR IP)

## 2024-03-10 PROCEDURE — 94640 AIRWAY INHALATION TREATMENT: CPT

## 2024-03-10 PROCEDURE — 2060000000 HC ICU INTERMEDIATE R&B

## 2024-03-10 PROCEDURE — 94761 N-INVAS EAR/PLS OXIMETRY MLT: CPT

## 2024-03-10 RX ADMIN — METRONIDAZOLE 500 MG: 500 INJECTION, SOLUTION INTRAVENOUS at 10:17

## 2024-03-10 RX ADMIN — LOSARTAN POTASSIUM 25 MG: 50 TABLET, FILM COATED ORAL at 20:24

## 2024-03-10 RX ADMIN — Medication 2000 MG: at 13:39

## 2024-03-10 RX ADMIN — METRONIDAZOLE 500 MG: 500 INJECTION, SOLUTION INTRAVENOUS at 03:11

## 2024-03-10 RX ADMIN — TIOTROPIUM BROMIDE INHALATION SPRAY 2 PUFF: 3.12 SPRAY, METERED RESPIRATORY (INHALATION) at 09:49

## 2024-03-10 RX ADMIN — SODIUM CHLORIDE, PRESERVATIVE FREE 10 ML: 5 INJECTION INTRAVENOUS at 20:23

## 2024-03-10 RX ADMIN — HYDROCODONE BITARTRATE AND ACETAMINOPHEN 2 TABLET: 5; 325 TABLET ORAL at 11:50

## 2024-03-10 RX ADMIN — HYDROCODONE BITARTRATE AND ACETAMINOPHEN 1 TABLET: 5; 325 TABLET ORAL at 20:29

## 2024-03-10 RX ADMIN — SODIUM CHLORIDE, PRESERVATIVE FREE 10 ML: 5 INJECTION INTRAVENOUS at 08:31

## 2024-03-10 RX ADMIN — Medication 2000 MG: at 05:53

## 2024-03-10 RX ADMIN — BUDESONIDE AND FORMOTEROL FUMARATE DIHYDRATE 2 PUFF: 160; 4.5 AEROSOL RESPIRATORY (INHALATION) at 20:50

## 2024-03-10 RX ADMIN — BUDESONIDE AND FORMOTEROL FUMARATE DIHYDRATE 2 PUFF: 160; 4.5 AEROSOL RESPIRATORY (INHALATION) at 09:49

## 2024-03-10 RX ADMIN — Medication 2000 MG: at 22:50

## 2024-03-10 RX ADMIN — METRONIDAZOLE 500 MG: 500 INJECTION, SOLUTION INTRAVENOUS at 17:39

## 2024-03-10 RX ADMIN — HYDROCODONE BITARTRATE AND ACETAMINOPHEN 1 TABLET: 5; 325 TABLET ORAL at 01:42

## 2024-03-10 ASSESSMENT — PAIN DESCRIPTION - DESCRIPTORS
DESCRIPTORS: ACHING
DESCRIPTORS: ACHING

## 2024-03-10 ASSESSMENT — PAIN DESCRIPTION - ORIENTATION: ORIENTATION: MID

## 2024-03-10 ASSESSMENT — PAIN DESCRIPTION - LOCATION
LOCATION: ABDOMEN;BACK
LOCATION: ABDOMEN
LOCATION: BACK;ABDOMEN

## 2024-03-10 ASSESSMENT — PAIN SCALES - GENERAL
PAINLEVEL_OUTOF10: 6
PAINLEVEL_OUTOF10: 5
PAINLEVEL_OUTOF10: 0
PAINLEVEL_OUTOF10: 7
PAINLEVEL_OUTOF10: 6

## 2024-03-10 ASSESSMENT — PAIN - FUNCTIONAL ASSESSMENT: PAIN_FUNCTIONAL_ASSESSMENT: PREVENTS OR INTERFERES SOME ACTIVE ACTIVITIES AND ADLS

## 2024-03-10 NOTE — PLAN OF CARE
Problem: Discharge Planning  Goal: Discharge to home or other facility with appropriate resources  3/10/2024 0023 by Lesli Munguia RN  Outcome: Progressing     Problem: Chronic Conditions and Co-morbidities  Goal: Patient's chronic conditions and co-morbidity symptoms are monitored and maintained or improved  3/10/2024 0023 by Lesli Munguia RN  Outcome: Progressing     Problem: Safety - Adult  Goal: Free from fall injury  3/10/2024 0023 by Lesli Munguia RN  Outcome: Progressing     Problem: Respiratory - Adult  Goal: Achieves optimal ventilation and oxygenation  3/10/2024 0023 by Lesli Munguia, RN  Outcome: Progressing

## 2024-03-10 NOTE — PROGRESS NOTES
[facility] Surgery Progress Note            PATIENT NAME: Irais Max     TODAY'S DATE: 3/10/2024, 7:17 AM    CC: colocutaneous fistula    SUBJECTIVE:    Pt seen and examined. No acute events overnight, patient tolerated clear liquid diet without nausea or vomiting, and she wants to try more food. WBC and BMP is pending as of this morning.  Patient has not yet passed bowel movement but she passed multiple gas yesterday.  Her abdominal pain has stayed similar compared to yesterday.    OBJECTIVE:   Vitals:  BP (!) 112/54 Comment: within order parameters  Pulse 84   Temp 98.6 °F (37 °C) (Oral)   Resp 18   Ht 1.524 m (5')   Wt 101.7 kg (224 lb 3.3 oz)   SpO2 92%   BMI 43.79 kg/m²    TEMPERATURE:  Current - Temp: 98.6 °F (37 °C); Max - Temp  Av.9 °F (37.2 °C)  Min: 98.4 °F (36.9 °C)  Max: 99.3 °F (37.4 °C)    INTAKE/OUTPUT:    No intake or output data in the 24 hours ending 03/10/24 0717                General: AOx3, NAD  Lungs: Symmetrical chest rise bilaterally, no audible wheezes or rhonchi  Heart: S1S2  Abdomen: Tender to palpate along the wound VAC.  Soft, ND, vac intact with minimal SS drainage in collection bin.  No rebound or guarding  Extremity: moves all extremities x4, No edema      Data Review:  CBC:   Recent Labs     24  0541 24  0649   WBC 17.0* 17.4*   HGB 11.1* 10.9*    295       BMP:    Recent Labs     24  0541 24  0649    135   K 4.3 4.0    98   CO2 27 28   BUN 8 7*   CREATININE 0.4* 0.4*   GLUCOSE 111* 97       Hepatic: No results for input(s): \"AST\", \"ALT\", \"ALB\", \"ALKPHOS\", \"BILITOT\", \"BILIDIR\" in the last 72 hours.  Coagulation: No results for input(s): \"APTT\", \"PROT\", \"INR\" in the last 72 hours.          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  If her WBC is trending down, we will advance her diet from clear liquid diet to full liquid diet.  WOCN changed wound vac Friday  Ambulate, IS while in bed  PT/OT/RT  Ancef/flagyl  Pain control prn, minimize narcotics as tolerated  Supportive care      Electronically signed by Mayito Jose DO on 3/10/2024 at 7:20 AM  General Surgery Resident, PGY-2       Attending Physician Statement  I have discussed the case with Dr Jose, including pertinent history and exam findings with the resident. I have seen and examined the patient and the key elements of the encounter have been performed by me.  I agree with the assessment, plan and orders as documented by the resident.      Patient is doing better this am.  Abdominal pain is resolving.  Denies nausea or vomiting.  Denies fevers, chills, or sweats. Passing flatus.  Patient does not have an acute surgical abdomen.  Will continue to follow.      Electronically signed by Vitor Frazier IV, DO  on 3/10/2024 at 8:11 AM

## 2024-03-10 NOTE — PLAN OF CARE
Problem: Discharge Planning  Goal: Discharge to home or other facility with appropriate resources  Outcome: Progressing     Problem: Chronic Conditions and Co-morbidities  Goal: Patient's chronic conditions and co-morbidity symptoms are monitored and maintained or improved  Outcome: Progressing     Problem: Pain  Goal: Verbalizes/displays adequate comfort level or baseline comfort level  Outcome: Progressing     Problem: Safety - Adult  Goal: Free from fall injury  Outcome: Progressing     Problem: Respiratory - Adult  Goal: Achieves optimal ventilation and oxygenation  Outcome: Progressing

## 2024-03-11 ENCOUNTER — APPOINTMENT (OUTPATIENT)
Dept: VASCULAR LAB | Age: 69
End: 2024-03-11
Attending: SURGERY
Payer: MEDICARE

## 2024-03-11 LAB
ANTI-XA UNFRAC HEPARIN: 0.6 IU/L
ANTI-XA UNFRAC HEPARIN: <0.1 IU/L
ECHO BSA: 1.96 M2
INR PPP: 1.2
PARTIAL THROMBOPLASTIN TIME: 27.6 SEC (ref 23–36.5)
PROTHROMBIN TIME: 15.3 SEC (ref 11.7–14.9)
SURGICAL PATHOLOGY REPORT: NORMAL

## 2024-03-11 PROCEDURE — 6370000000 HC RX 637 (ALT 250 FOR IP): Performed by: SURGERY

## 2024-03-11 PROCEDURE — 6360000002 HC RX W HCPCS: Performed by: SURGERY

## 2024-03-11 PROCEDURE — 99223 1ST HOSP IP/OBS HIGH 75: CPT | Performed by: SURGERY

## 2024-03-11 PROCEDURE — 93970 EXTREMITY STUDY: CPT

## 2024-03-11 PROCEDURE — 85610 PROTHROMBIN TIME: CPT

## 2024-03-11 PROCEDURE — 2580000003 HC RX 258: Performed by: SURGERY

## 2024-03-11 PROCEDURE — 93970 EXTREMITY STUDY: CPT | Performed by: SURGERY

## 2024-03-11 PROCEDURE — 99024 POSTOP FOLLOW-UP VISIT: CPT | Performed by: SURGERY

## 2024-03-11 PROCEDURE — 2060000000 HC ICU INTERMEDIATE R&B

## 2024-03-11 PROCEDURE — 97535 SELF CARE MNGMENT TRAINING: CPT

## 2024-03-11 PROCEDURE — 36415 COLL VENOUS BLD VENIPUNCTURE: CPT

## 2024-03-11 PROCEDURE — 85520 HEPARIN ASSAY: CPT

## 2024-03-11 PROCEDURE — 85730 THROMBOPLASTIN TIME PARTIAL: CPT

## 2024-03-11 PROCEDURE — 97606 NEG PRS WND THER DME>50 SQCM: CPT

## 2024-03-11 PROCEDURE — 94640 AIRWAY INHALATION TREATMENT: CPT

## 2024-03-11 PROCEDURE — 2500000003 HC RX 250 WO HCPCS: Performed by: STUDENT IN AN ORGANIZED HEALTH CARE EDUCATION/TRAINING PROGRAM

## 2024-03-11 PROCEDURE — 6360000002 HC RX W HCPCS: Performed by: STUDENT IN AN ORGANIZED HEALTH CARE EDUCATION/TRAINING PROGRAM

## 2024-03-11 PROCEDURE — 6370000000 HC RX 637 (ALT 250 FOR IP)

## 2024-03-11 PROCEDURE — 97110 THERAPEUTIC EXERCISES: CPT

## 2024-03-11 PROCEDURE — 97530 THERAPEUTIC ACTIVITIES: CPT

## 2024-03-11 PROCEDURE — 94761 N-INVAS EAR/PLS OXIMETRY MLT: CPT

## 2024-03-11 RX ORDER — DEXTROSE MONOHYDRATE, SODIUM CHLORIDE, AND POTASSIUM CHLORIDE 50; 1.49; 4.5 G/1000ML; G/1000ML; G/1000ML
INJECTION, SOLUTION INTRAVENOUS CONTINUOUS
Status: DISCONTINUED | OUTPATIENT
Start: 2024-03-12 | End: 2024-03-11

## 2024-03-11 RX ORDER — HEPARIN SODIUM 1000 [USP'U]/ML
40 INJECTION, SOLUTION INTRAVENOUS; SUBCUTANEOUS PRN
Status: DISCONTINUED | OUTPATIENT
Start: 2024-03-11 | End: 2024-03-13

## 2024-03-11 RX ORDER — HEPARIN SODIUM 1000 [USP'U]/ML
80 INJECTION, SOLUTION INTRAVENOUS; SUBCUTANEOUS ONCE
Status: COMPLETED | OUTPATIENT
Start: 2024-03-11 | End: 2024-03-11

## 2024-03-11 RX ORDER — CEPHALEXIN 500 MG/1
500 CAPSULE ORAL 2 TIMES DAILY
Qty: 20 CAPSULE | Refills: 0 | Status: SHIPPED | OUTPATIENT
Start: 2024-03-11 | End: 2024-03-21

## 2024-03-11 RX ORDER — OXYCODONE HYDROCHLORIDE AND ACETAMINOPHEN 5; 325 MG/1; MG/1
1 TABLET ORAL EVERY 6 HOURS PRN
Qty: 15 TABLET | Refills: 0 | Status: SHIPPED | OUTPATIENT
Start: 2024-03-11 | End: 2024-03-15 | Stop reason: HOSPADM

## 2024-03-11 RX ORDER — HEPARIN SODIUM 1000 [USP'U]/ML
80 INJECTION, SOLUTION INTRAVENOUS; SUBCUTANEOUS PRN
Status: DISCONTINUED | OUTPATIENT
Start: 2024-03-11 | End: 2024-03-13

## 2024-03-11 RX ORDER — DEXTROSE MONOHYDRATE, SODIUM CHLORIDE, AND POTASSIUM CHLORIDE 50; 1.49; 4.5 G/1000ML; G/1000ML; G/1000ML
INJECTION, SOLUTION INTRAVENOUS CONTINUOUS
Status: DISCONTINUED | OUTPATIENT
Start: 2024-03-11 | End: 2024-03-12

## 2024-03-11 RX ORDER — METRONIDAZOLE 500 MG/1
500 TABLET ORAL 3 TIMES DAILY
Qty: 30 TABLET | Refills: 0 | Status: SHIPPED | OUTPATIENT
Start: 2024-03-11 | End: 2024-03-21

## 2024-03-11 RX ORDER — HEPARIN SODIUM 10000 [USP'U]/100ML
5-30 INJECTION, SOLUTION INTRAVENOUS CONTINUOUS
Status: DISCONTINUED | OUTPATIENT
Start: 2024-03-11 | End: 2024-03-13

## 2024-03-11 RX ADMIN — METRONIDAZOLE 500 MG: 500 INJECTION, SOLUTION INTRAVENOUS at 10:04

## 2024-03-11 RX ADMIN — HYDROCODONE BITARTRATE AND ACETAMINOPHEN 1 TABLET: 5; 325 TABLET ORAL at 21:20

## 2024-03-11 RX ADMIN — BUDESONIDE AND FORMOTEROL FUMARATE DIHYDRATE 2 PUFF: 160; 4.5 AEROSOL RESPIRATORY (INHALATION) at 20:18

## 2024-03-11 RX ADMIN — Medication 2000 MG: at 06:25

## 2024-03-11 RX ADMIN — SODIUM CHLORIDE, PRESERVATIVE FREE 10 ML: 5 INJECTION INTRAVENOUS at 21:22

## 2024-03-11 RX ADMIN — ACETAMINOPHEN 650 MG: 325 TABLET ORAL at 02:00

## 2024-03-11 RX ADMIN — Medication 2000 MG: at 23:19

## 2024-03-11 RX ADMIN — BUDESONIDE AND FORMOTEROL FUMARATE DIHYDRATE 2 PUFF: 160; 4.5 AEROSOL RESPIRATORY (INHALATION) at 09:10

## 2024-03-11 RX ADMIN — FUROSEMIDE 10 MG: 20 TABLET ORAL at 08:52

## 2024-03-11 RX ADMIN — LOSARTAN POTASSIUM 25 MG: 50 TABLET, FILM COATED ORAL at 21:20

## 2024-03-11 RX ADMIN — METRONIDAZOLE 500 MG: 500 INJECTION, SOLUTION INTRAVENOUS at 18:32

## 2024-03-11 RX ADMIN — TIOTROPIUM BROMIDE INHALATION SPRAY 2 PUFF: 3.12 SPRAY, METERED RESPIRATORY (INHALATION) at 09:10

## 2024-03-11 RX ADMIN — HEPARIN SODIUM 18 UNITS/KG/HR: 10000 INJECTION, SOLUTION INTRAVENOUS at 15:46

## 2024-03-11 RX ADMIN — METRONIDAZOLE 500 MG: 500 INJECTION, SOLUTION INTRAVENOUS at 02:03

## 2024-03-11 RX ADMIN — Medication 2000 MG: at 14:43

## 2024-03-11 RX ADMIN — SODIUM CHLORIDE, PRESERVATIVE FREE 10 ML: 5 INJECTION INTRAVENOUS at 08:52

## 2024-03-11 RX ADMIN — POTASSIUM CHLORIDE, DEXTROSE MONOHYDRATE AND SODIUM CHLORIDE: 150; 5; 450 INJECTION, SOLUTION INTRAVENOUS at 23:19

## 2024-03-11 RX ADMIN — HYDROCODONE BITARTRATE AND ACETAMINOPHEN 1 TABLET: 5; 325 TABLET ORAL at 02:01

## 2024-03-11 RX ADMIN — HYDROCODONE BITARTRATE AND ACETAMINOPHEN 2 TABLET: 5; 325 TABLET ORAL at 17:29

## 2024-03-11 RX ADMIN — HEPARIN SODIUM 8140 UNITS: 1000 INJECTION INTRAVENOUS; SUBCUTANEOUS at 15:44

## 2024-03-11 RX ADMIN — MORPHINE SULFATE 4 MG: 4 INJECTION INTRAVENOUS at 10:23

## 2024-03-11 ASSESSMENT — ENCOUNTER SYMPTOMS
SHORTNESS OF BREATH: 0
ABDOMINAL DISTENTION: 0
EYES NEGATIVE: 1
ABDOMINAL PAIN: 1
WHEEZING: 0
CHEST TIGHTNESS: 0
COLOR CHANGE: 0

## 2024-03-11 ASSESSMENT — PAIN DESCRIPTION - ORIENTATION
ORIENTATION: MID

## 2024-03-11 ASSESSMENT — PAIN SCALES - GENERAL
PAINLEVEL_OUTOF10: 5
PAINLEVEL_OUTOF10: 4
PAINLEVEL_OUTOF10: 6
PAINLEVEL_OUTOF10: 8

## 2024-03-11 ASSESSMENT — PAIN DESCRIPTION - LOCATION
LOCATION: ABDOMEN

## 2024-03-11 ASSESSMENT — PAIN DESCRIPTION - DESCRIPTORS
DESCRIPTORS: ACHING;DISCOMFORT
DESCRIPTORS: ACHING

## 2024-03-11 NOTE — PROGRESS NOTES
Reason for [facility] Nurse Evaluation and Assessment:   NPWT dressing changes to abdominal incision.    Wound History:   3/6/2024 : Exploratory laparotomy, Takedown transverse colocutaneous fistula, Takedown of small bowel adhesions to abdominal wall mesh  Explant of abdominal wall mesh x2, Muscle fascia debridement ~20 sq cm, placement of wound vac >50sqcm with  for abdominal wall abscess secondary to transverse colocutaneous fistula, fascial dehiscence, early signs of fistulization of small bowel to mesh x2  Current Wound Care Treatment:  NPWT at -125 mmHg     03/11/24 1120   Negative Pressure Wound Therapy Abdomen Lower;Medial   No placement date or time found.   Location: Abdomen  Wound Location Orientation: Lower;Medial   Wound Type Surgical   Unit Type VAC ulta   Dressing Type White Foam;Black Foam   Number of pieces used 3  (2 white; 1 black)   Number of pieces removed 3   Cycle Continuous;On   Target Pressure (mmHg) 125   Canister changed? No   Dressing Status New dressing applied   Dressing Changed (S)  Changed/New   Drainage Amount Large   Drainage Description Thick;Serosanguinous  (milky red)   Dressing Change Due 03/13/24   Wound Assessment Fibrinous;Exposed structure fascia;Subcutaneous  (fascial suture in base; slough edudate in base)   Mireille-wound Assessment Intact  (dermatac drape used;  removal was atraumatic)   Incision 03/06/24 Abdomen Lower;Medial   Date First Assessed/Time First Assessed: 03/06/24 1409   Present on Original Admission: No  Location: Abdomen  Incision Location Orientation: Lower;Medial   Wound Image    Dressing Status New dressing applied   Dressing Change Due 03/13/24   Incision Cleansed Cleansed with saline   Dressing/Treatment Negative pressure wound therapy   Drainage Amount Large (50-75% saturated)  (large volume in canister)   Drainage Description Thick;Serosanguinous  (milky red)   Mireille-incision Assessment Intact     NPWT dressing changed.  Medicated for pain prior to start

## 2024-03-11 NOTE — PLAN OF CARE
Problem: Discharge Planning  Goal: Discharge to home or other facility with appropriate resources  3/11/2024 0213 by Alex De La Torre RN  Outcome: Progressing  3/10/2024 1804 by Anna Perez RN  Outcome: Progressing     Problem: Chronic Conditions and Co-morbidities  Goal: Patient's chronic conditions and co-morbidity symptoms are monitored and maintained or improved  3/11/2024 0213 by Alex De La Torre RN  Outcome: Progressing  3/10/2024 1804 by Anna Perez RN  Outcome: Progressing     Problem: Pain  Goal: Verbalizes/displays adequate comfort level or baseline comfort level  3/11/2024 0213 by Alex De La Torre RN  Outcome: Progressing  Flowsheets  Taken 3/10/2024 2302  Verbalizes/displays adequate comfort level or baseline comfort level: Encourage patient to monitor pain and request assistance  Taken 3/10/2024 2014  Verbalizes/displays adequate comfort level or baseline comfort level: Encourage patient to monitor pain and request assistance  3/10/2024 1804 by Anna Perez RN  Outcome: Progressing     Problem: Safety - Adult  Goal: Free from fall injury  3/11/2024 0213 by Alex De La Torre RN  Outcome: Progressing  Flowsheets (Taken 3/10/2024 2000)  Free From Fall Injury: Instruct family/caregiver on patient safety  3/10/2024 1804 by Anna Perez RN  Outcome: Progressing     Problem: Respiratory - Adult  Goal: Achieves optimal ventilation and oxygenation  3/11/2024 0213 by Alex De La Torre RN  Outcome: Progressing  3/10/2024 1804 by Anna Perez RN  Outcome: Progressing

## 2024-03-11 NOTE — CARE COORDINATION
Transitional planning    Writer had MD complete vac paperwork, faxed to [facility]. Patient provided SNF list and chooses [facility], referrals sent.        1530 call from Aleisha with [facility], can  accept, updated that patient has wound vac        1630 Artis dutton started.

## 2024-03-11 NOTE — PROGRESS NOTES
[facility] General Surgery Progress Note            PATIENT NAME: Irais Max     TODAY'S DATE: 3/11/2024, 6:56 AM    No chief complaint on file.      SUBJECTIVE:    Pt seen and examined. No acute events overnight, pain well controlled and tolerated, passing flatus and has had several BM. No new issues otherwise.    OBJECTIVE:   Vitals:  BP (!) 125/58   Pulse 69   Temp 97.7 °F (36.5 °C) (Oral)   Resp 20   Ht 1.524 m (5')   Wt 101.7 kg (224 lb 3.3 oz)   SpO2 95%   BMI 43.79 kg/m²    TEMPERATURE:  Current - Temp: 97.7 °F (36.5 °C); Max - Temp  Av.7 °F (37.1 °C)  Min: 97.7 °F (36.5 °C)  Max: 99.6 °F (37.6 °C)    INTAKE/OUTPUT:      Intake/Output Summary (Last 24 hours) at 3/11/2024 0656  Last data filed at 3/10/2024 0800  Gross per 24 hour   Intake 505.84 ml   Output --   Net 505.84 ml                 General: AOx3, NAD  Lungs: Symmetrical chest rise bilaterally, no audible wheezes or rhonchi  Heart: S1S2  Abdomen: Soft, ND, vac intact without leak, mainly serous output in vac container  Extremity: moves all extremities x4, No edema      Data Review:  CBC:   Recent Labs     24  0649 03/10/24  0635   WBC 17.4* 15.6*   HGB 10.9* 11.1*    284     BMP:    Recent Labs     24  0649 03/10/24  0635    136   K 4.0 3.7   CL 98 99   CO2 28 25   BUN 7* 6*   CREATININE 0.4* 0.4*   GLUCOSE 97 92     Hepatic: No results for input(s): \"AST\", \"ALT\", \"ALB\", \"ALKPHOS\", \"BILITOT\", \"BILIDIR\" in the last 72 hours.  Coagulation: No results for input(s): \"APTT\", \"PROT\", \"INR\" in the last 72 hours.          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

## 2024-03-11 NOTE — PROGRESS NOTES
Physical Therapy  Facility/Department: [address] ONC/MED SURG  Daily Treatment Note    Name: Irais Max  : 1955  MRN: [id number]  Date of Service: 3/11/2024    Discharge Recommendations:  Patient would benefit from continued therapy after discharge          Patient Diagnosis(es): The primary encounter diagnosis was Colocutaneous fistula. Diagnoses of Abdominal wall pain and Post-operative pain were also pertinent to this visit.  Past Medical History:  has a past medical history of Abdominal pain, Anxiety, Asthma, COPD (chronic obstructive pulmonary disease) ([facility]), COVID-19 vaccine series completed, Diabetes mellitus ([facility]), Full dentures, Goiter, Hyperlipidemia, Hyperparathyroidism ([facility]), Hypertension, Lives alone, Obesity, Oxygen dependent, Recurrent ventral hernia, Sleep apnea, Under care of team, Under care of team, Wears glasses, and Wellness examination.  Past Surgical History:  has a past surgical history that includes Hysterectomy ();  section (); ventral hernia repair; Tubal ligation (); hernia repair (N/A, 2024); Exploratory laparotomy w/ bowel resection (2024); and laparotomy (N/A, 3/6/2024).    Assessment   Body Structures, Functions, Activity Limitations Requiring Skilled Therapeutic Intervention: Decreased functional mobility ;Increased pain;Decreased strength;Decreased balance  Assessment: Pt ambulated 50ft x 2 with RW and CGA. Pt required CGA for sit<>stands from toilet and EOB. Pt would benefit from PT following d/c to facilitate a return to prior level of function.  Therapy Prognosis: Good  Activity Tolerance  Activity Tolerance: Patient limited by fatigue;Patient limited by pain     Plan   Physical Therapy Plan  General Plan:  (5-6x/wk)  Current Treatment Recommendations: Strengthening, Gait training, Stair training, Functional mobility training, Transfer training, Safety education & training, Patient/Caregiver education & training, Pain management, Home exercise     Time Out 1021         Minutes 32         Timed Code Treatment Minutes: 25 Minutes       Rufino Junior PTA

## 2024-03-11 NOTE — PROGRESS NOTES
Occupational Therapy  Facility/Department: [address] ONC/MED SURG  Occupational Therapy Daily Treatment Note    Name: Irais Max  : 1955  MRN: [id number]  Date of Service: 3/11/2024    Discharge Recommendations: Further therapy recommended at discharge.    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 Diagnoses of Abdominal wall pain and Post-operative pain were also pertinent to this visit.  Past Medical History:  has a past medical history of Abdominal pain, Anxiety, Asthma, COPD (chronic obstructive pulmonary disease) ([facility]), COVID-19 vaccine series completed, Diabetes mellitus ([facility]), Full dentures, Goiter, Hyperlipidemia, Hyperparathyroidism ([facility]), Hypertension, Lives alone, Obesity, Oxygen dependent, Recurrent ventral hernia, Sleep apnea, Under care of team, Under care of team, Wears glasses, and Wellness examination.  Past Surgical History:  has a past surgical history that includes Hysterectomy ();  section (); ventral hernia repair; Tubal ligation (); hernia repair (N/A, 2024); Exploratory laparotomy w/ bowel resection (2024); and laparotomy (N/A, 3/6/2024).       Assessment   Performance deficits / Impairments: Decreased functional mobility ;Decreased ADL status;Decreased safe awareness;Decreased endurance;Decreased balance;Decreased high-level IADLs  Assessment: Pt dem above deficits impacting participation/independence in ADLs. Pt would benefit from continued acute OT as well as upon discharge to increase independence during functional tasks and increase endurance/balance with functional activities. Pt verbalized concerns w/ safety during completion of ADLs independently at home at this time, and reports interest in receiving further therapy upon discharge prior to returning to home.  Prognosis: Good  Decision Making: Medium Complexity  REQUIRES OT FOLLOW-UP: Yes  Activity Tolerance  Activity Tolerance: Patient Tolerated treatment  0901  10:46         Minutes 52  4  Total = 56 min.         Timed Code Treatment Minutes: 56 Minutes       Georgette Bradshaw S/OT

## 2024-03-11 NOTE — PLAN OF CARE
Problem: Discharge Planning  Goal: Discharge to home or other facility with appropriate resources  Outcome: Progressing     Problem: Chronic Conditions and Co-morbidities  Goal: Patient's chronic conditions and co-morbidity symptoms are monitored and maintained or improved  Outcome: Progressing     Problem: Pain  Goal: Verbalizes/displays adequate comfort level or baseline comfort level  Outcome: Progressing     Problem: Safety - Adult  Goal: Free from fall injury  Outcome: Progressing     Problem: Respiratory - Adult  Goal: Achieves optimal ventilation and oxygenation  Outcome: Progressing     Problem: ABCDS Injury Assessment  Goal: Absence of physical injury  Outcome: Progressing

## 2024-03-11 NOTE — DISCHARGE INSTRUCTIONS
Patient Discharge Instructions  Discharge Date:  3/11/2024    Wound care: white foam base with black foam covering to midline abdomen, wound vac setting -125mmH continuous, change vac dressings every 48-72hrs    HYGEINE: Ok to shower, no soaking in a tub/pool until seen at your follow up appointment.     DRIVING: No driving while taking narcotic medications or while in pain    ACTIVITY: No lifting greater than 20lbs for 6 weeks or any strenuous activity. Otherwise okay to continue all appropriate PT/OT/RT activities.      DIET: Resume your normal diet as advised by your PCP.    MEDICATIONS: Take all medications as prescribed.     SPECIAL INSTRUCTIONS:     Follow up with Dr. Purdy in 10-14 days after discharge, call the clinic for appointment. Call sooner if fever above 100.4 degrees Farenheit, increase in swelling or redness, thick purulent discharge or pain not controlled with medications.

## 2024-03-11 NOTE — CONSULTS
Division of Vascular Surgery        New Consult      Physician Requesting Consult:  Dr. Purdy    Reason for Consult:   Right lower extremity DVT    Chief Complaint:      Right leg swelling    History of Present Illness:      Irais Max is a 68 y.o. woman s/p exploratory laparotomy with, fascial closure and wound vac placement, who was found to have swelling and pain in the lower right extremity. Patient underwent venous duplex imaging showing acute DVT extending from the right external iliac distal, throughout all the named veins of the leg excluding the great saphenous.  Patient refused DVT ppx for the last 4 days. Patient states no chest pain or shortness of breath. Patient reports abdomina [place] foam was changed today and had some tenderness. Patient states she quit smoking 9 years ago, does not have cancer, does not take hormone based medication, and has not had a clot in the past.     Medical History:     Past Medical History:   Diagnosis Date    Abdominal pain 2024    with feve and malaise , CT scan done and Dr. Purdy recommended immediate surgery but pt refused, see notes    Anxiety     Asthma     COPD (chronic obstructive pulmonary disease) (HCC)     COVID-19 vaccine series completed     Diabetes mellitus ([facility])     Full dentures     upper and lower, does not want anyone to see her without her dentures in    Goiter     Hyperlipidemia     Hyperparathyroidism (HCC)     Hypertension     Lives alone     Obesity     Oxygen dependent     2L / NC prn    Recurrent ventral hernia 2023    Sleep apnea     CPAP    Under care of team     Pulmonologist,   Dr. Crawford, last seen 10/31/2023, clearance on chart, high risk    Under care of team     Gen Surg, Dr. Purdy, last seen 2024    Wears glasses     Wellness examination     PCP, Dr. Jones, last seen 2023 , clearance on chart       Surgical History:     Past Surgical History:   Procedure Laterality Date     SECTION  1976    EXPLORATORY  10 mg  10 mg Oral Daily Christo Purdy LAURA, DO   10 mg at 03/11/24 0852    cyclobenzaprine (FLEXERIL) tablet 5 mg  5 mg Oral TID PRN Christo Purdy LAURA, DO   5 mg at 03/09/24 1749    budesonide-formoterol (SYMBICORT) 160-4.5 MCG/ACT inhaler 2 puff  2 puff Inhalation BID RT Christo Purdy LAURA, DO   2 puff at 03/11/24 0910    And    tiotropium (SPIRIVA RESPIMAT) 2.5 MCG/ACT inhaler 2 puff  2 puff Inhalation Daily RT Christo Purdy, DO   2 puff at 03/11/24 0910       Social History:     Tobacco:    reports that she quit smoking about 9 years ago. Her smoking use included cigarettes. She started smoking about 50 years ago. She has a 61.5 pack-year smoking history. She has never used smokeless tobacco.  Alcohol:      reports no history of alcohol use.  Drug Use:  reports no history of drug use.      Review of Systems:     Review of Systems   Constitutional: Negative.    HENT: Negative.     Eyes: Negative.    Respiratory:  Negative for chest tightness, shortness of breath and wheezing.    Cardiovascular:  Positive for leg swelling. Negative for chest pain.   Gastrointestinal:  Positive for abdominal pain. Negative for abdominal distention.   Musculoskeletal:         Calf pain   Skin:  Negative for color change, rash and wound.       Physical Exam:     Vitals:  BP (!) 144/69   Pulse 87   Temp 98.9 °F (37.2 °C) (Oral)   Resp 20   Ht 1.524 m (5')   Wt 101.7 kg (224 lb 3.3 oz)   SpO2 90%   BMI 43.79 kg/m²     Physical Exam  Constitutional:       General: She is not in acute distress.     Appearance: Normal appearance. She is obese. She is not ill-appearing, toxic-appearing or diaphoretic.   HENT:      Head: Normocephalic and atraumatic.      Right Ear: External ear normal.      Left Ear: External ear normal.      Nose: Nose normal.      Mouth/Throat:      Mouth: Mucous membranes are moist.      Pharynx: Oropharynx is clear.   Eyes:      General: No scleral icterus.     Conjunctiva/sclera: Conjunctivae normal.   Cardiovascular:      Rate

## 2024-03-12 ENCOUNTER — OFFICE VISIT (OUTPATIENT)
Dept: OPERATING ROOM | Age: 69
End: 2024-03-12
Attending: SURGERY

## 2024-03-12 ENCOUNTER — ANESTHESIA EVENT (OUTPATIENT)
Dept: OPERATING ROOM | Age: 69
End: 2024-03-12
Payer: MEDICARE

## 2024-03-12 ENCOUNTER — ANESTHESIA (OUTPATIENT)
Dept: OPERATING ROOM | Age: 69
End: 2024-03-12
Payer: MEDICARE

## 2024-03-12 PROBLEM — I82.421 ACUTE DEEP VEIN THROMBOSIS (DVT) OF ILIAC VEIN OF RIGHT LOWER EXTREMITY (HCC): Status: ACTIVE | Noted: 2024-03-12

## 2024-03-12 LAB
ANION GAP SERPL CALCULATED.3IONS-SCNC: 8 MMOL/L (ref 9–16)
ANTI-XA UNFRAC HEPARIN: 0.47 IU/L
BASOPHILS # BLD: 0.14 K/UL (ref 0–0.2)
BASOPHILS NFR BLD: 1 % (ref 0–2)
BUN SERPL-MCNC: 6 MG/DL (ref 8–23)
CALCIUM SERPL-MCNC: 9.1 MG/DL (ref 8.6–10.4)
CHLORIDE SERPL-SCNC: 100 MMOL/L (ref 98–107)
CO2 SERPL-SCNC: 29 MMOL/L (ref 20–31)
CREAT SERPL-MCNC: 0.4 MG/DL (ref 0.5–0.9)
ECHO BSA: 1.96 M2
EOSINOPHIL # BLD: 1.14 K/UL (ref 0–0.44)
EOSINOPHILS RELATIVE PERCENT: 8 % (ref 1–4)
ERYTHROCYTE [DISTWIDTH] IN BLOOD BY AUTOMATED COUNT: 14.1 % (ref 11.8–14.4)
GFR SERPL CREATININE-BSD FRML MDRD: >60 ML/MIN/1.73M2
GLUCOSE SERPL-MCNC: 179 MG/DL (ref 74–99)
HCT VFR BLD AUTO: 37.1 % (ref 36.3–47.1)
HGB BLD-MCNC: 11.3 G/DL (ref 11.9–15.1)
IMM GRANULOCYTES # BLD AUTO: 0.85 K/UL (ref 0–0.3)
IMM GRANULOCYTES NFR BLD: 6 %
LYMPHOCYTES NFR BLD: 2.13 K/UL (ref 1.1–3.7)
LYMPHOCYTES RELATIVE PERCENT: 15 % (ref 24–43)
MAGNESIUM SERPL-MCNC: 2.2 MG/DL (ref 1.6–2.4)
MCH RBC QN AUTO: 28.4 PG (ref 25.2–33.5)
MCHC RBC AUTO-ENTMCNC: 30.5 G/DL (ref 28.4–34.8)
MCV RBC AUTO: 93.2 FL (ref 82.6–102.9)
MONOCYTES NFR BLD: 1.14 K/UL (ref 0.1–1.2)
MONOCYTES NFR BLD: 8 % (ref 3–12)
MORPHOLOGY: NORMAL
NEUTROPHILS NFR BLD: 62 % (ref 36–65)
NEUTS SEG NFR BLD: 8.8 K/UL (ref 1.5–8.1)
NRBC BLD-RTO: 0 PER 100 WBC
PLATELET # BLD AUTO: 272 K/UL (ref 138–453)
PMV BLD AUTO: 9.6 FL (ref 8.1–13.5)
POTASSIUM SERPL-SCNC: 3.2 MMOL/L (ref 3.7–5.3)
RBC # BLD AUTO: 3.98 M/UL (ref 3.95–5.11)
SODIUM SERPL-SCNC: 137 MMOL/L (ref 136–145)
WBC OTHER # BLD: 14.2 K/UL (ref 3.5–11.3)

## 2024-03-12 PROCEDURE — 94640 AIRWAY INHALATION TREATMENT: CPT

## 2024-03-12 PROCEDURE — 7100000010 HC PHASE II RECOVERY - FIRST 15 MIN: Performed by: SURGERY

## 2024-03-12 PROCEDURE — 1200000000 HC SEMI PRIVATE

## 2024-03-12 PROCEDURE — C1757 CATH, THROMBECTOMY/EMBOLECT: HCPCS | Performed by: SURGERY

## 2024-03-12 PROCEDURE — 6360000002 HC RX W HCPCS: Performed by: SURGERY

## 2024-03-12 PROCEDURE — 75820 VEIN X-RAY ARM/LEG: CPT | Performed by: SURGERY

## 2024-03-12 PROCEDURE — 80048 BASIC METABOLIC PNL TOTAL CA: CPT

## 2024-03-12 PROCEDURE — 3700000000 HC ANESTHESIA ATTENDED CARE: Performed by: SURGERY

## 2024-03-12 PROCEDURE — 2500000003 HC RX 250 WO HCPCS

## 2024-03-12 PROCEDURE — 85025 COMPLETE CBC W/AUTO DIFF WBC: CPT

## 2024-03-12 PROCEDURE — 3600000007 HC SURGERY HYBRID BASE: Performed by: SURGERY

## 2024-03-12 PROCEDURE — 06CC3ZZ EXTIRPATION OF MATTER FROM RIGHT COMMON ILIAC VEIN, PERCUTANEOUS APPROACH: ICD-10-PCS | Performed by: SURGERY

## 2024-03-12 PROCEDURE — 2580000003 HC RX 258: Performed by: SURGERY

## 2024-03-12 PROCEDURE — C1894 INTRO/SHEATH, NON-LASER: HCPCS | Performed by: SURGERY

## 2024-03-12 PROCEDURE — C1887 CATHETER, GUIDING: HCPCS | Performed by: SURGERY

## 2024-03-12 PROCEDURE — 37187 VENOUS MECH THROMBECTOMY: CPT | Performed by: SURGERY

## 2024-03-12 PROCEDURE — 6370000000 HC RX 637 (ALT 250 FOR IP): Performed by: SURGERY

## 2024-03-12 PROCEDURE — 7100000000 HC PACU RECOVERY - FIRST 15 MIN: Performed by: SURGERY

## 2024-03-12 PROCEDURE — 85520 HEPARIN ASSAY: CPT

## 2024-03-12 PROCEDURE — 2709999900 HC NON-CHARGEABLE SUPPLY: Performed by: SURGERY

## 2024-03-12 PROCEDURE — 6360000002 HC RX W HCPCS

## 2024-03-12 PROCEDURE — C1725 CATH, TRANSLUMIN NON-LASER: HCPCS | Performed by: SURGERY

## 2024-03-12 PROCEDURE — A4217 STERILE WATER/SALINE, 500 ML: HCPCS | Performed by: SURGERY

## 2024-03-12 PROCEDURE — 2500000003 HC RX 250 WO HCPCS: Performed by: SURGERY

## 2024-03-12 PROCEDURE — 2580000003 HC RX 258

## 2024-03-12 PROCEDURE — 06CM3ZZ EXTIRPATION OF MATTER FROM RIGHT FEMORAL VEIN, PERCUTANEOUS APPROACH: ICD-10-PCS | Performed by: SURGERY

## 2024-03-12 PROCEDURE — 3700000001 HC ADD 15 MINUTES (ANESTHESIA): Performed by: SURGERY

## 2024-03-12 PROCEDURE — 36005 INJECTION EXT VENOGRAPHY: CPT | Performed by: SURGERY

## 2024-03-12 PROCEDURE — 6360000004 HC RX CONTRAST MEDICATION: Performed by: SURGERY

## 2024-03-12 PROCEDURE — C1769 GUIDE WIRE: HCPCS | Performed by: SURGERY

## 2024-03-12 PROCEDURE — 36415 COLL VENOUS BLD VENIPUNCTURE: CPT

## 2024-03-12 PROCEDURE — 7100000001 HC PACU RECOVERY - ADDTL 15 MIN: Performed by: SURGERY

## 2024-03-12 PROCEDURE — 3600000017 HC SURGERY HYBRID ADDL 15MIN: Performed by: SURGERY

## 2024-03-12 PROCEDURE — 83735 ASSAY OF MAGNESIUM: CPT

## 2024-03-12 RX ORDER — FENTANYL CITRATE 50 UG/ML
25 INJECTION, SOLUTION INTRAMUSCULAR; INTRAVENOUS EVERY 5 MIN PRN
Status: DISCONTINUED | OUTPATIENT
Start: 2024-03-12 | End: 2024-03-12 | Stop reason: HOSPADM

## 2024-03-12 RX ORDER — ONDANSETRON 2 MG/ML
INJECTION INTRAMUSCULAR; INTRAVENOUS PRN
Status: DISCONTINUED | OUTPATIENT
Start: 2024-03-12 | End: 2024-03-12 | Stop reason: SDUPTHER

## 2024-03-12 RX ORDER — ROCURONIUM BROMIDE 10 MG/ML
INJECTION, SOLUTION INTRAVENOUS PRN
Status: DISCONTINUED | OUTPATIENT
Start: 2024-03-12 | End: 2024-03-12 | Stop reason: SDUPTHER

## 2024-03-12 RX ORDER — LIDOCAINE HYDROCHLORIDE 10 MG/ML
INJECTION, SOLUTION EPIDURAL; INFILTRATION; INTRACAUDAL; PERINEURAL PRN
Status: DISCONTINUED | OUTPATIENT
Start: 2024-03-12 | End: 2024-03-12 | Stop reason: SDUPTHER

## 2024-03-12 RX ORDER — SODIUM CHLORIDE 9 MG/ML
INJECTION, SOLUTION INTRAVENOUS PRN
Status: DISCONTINUED | OUTPATIENT
Start: 2024-03-12 | End: 2024-03-12 | Stop reason: HOSPADM

## 2024-03-12 RX ORDER — SODIUM CHLORIDE 0.9 % (FLUSH) 0.9 %
5-40 SYRINGE (ML) INJECTION EVERY 12 HOURS SCHEDULED
Status: DISCONTINUED | OUTPATIENT
Start: 2024-03-12 | End: 2024-03-12 | Stop reason: HOSPADM

## 2024-03-12 RX ORDER — ONDANSETRON 2 MG/ML
4 INJECTION INTRAMUSCULAR; INTRAVENOUS
Status: DISCONTINUED | OUTPATIENT
Start: 2024-03-12 | End: 2024-03-12 | Stop reason: HOSPADM

## 2024-03-12 RX ORDER — IODIXANOL 320 MG/ML
INJECTION, SOLUTION INTRAVASCULAR PRN
Status: DISCONTINUED | OUTPATIENT
Start: 2024-03-12 | End: 2024-03-12 | Stop reason: ALTCHOICE

## 2024-03-12 RX ORDER — DIPHENHYDRAMINE HYDROCHLORIDE 50 MG/ML
12.5 INJECTION INTRAMUSCULAR; INTRAVENOUS
Status: DISCONTINUED | OUTPATIENT
Start: 2024-03-12 | End: 2024-03-12 | Stop reason: HOSPADM

## 2024-03-12 RX ORDER — PROPOFOL 10 MG/ML
INJECTION, EMULSION INTRAVENOUS PRN
Status: DISCONTINUED | OUTPATIENT
Start: 2024-03-12 | End: 2024-03-12 | Stop reason: SDUPTHER

## 2024-03-12 RX ORDER — SODIUM CHLORIDE 0.9 % (FLUSH) 0.9 %
5-40 SYRINGE (ML) INJECTION PRN
Status: DISCONTINUED | OUTPATIENT
Start: 2024-03-12 | End: 2024-03-12 | Stop reason: HOSPADM

## 2024-03-12 RX ORDER — FENTANYL CITRATE 50 UG/ML
50 INJECTION, SOLUTION INTRAMUSCULAR; INTRAVENOUS EVERY 5 MIN PRN
Status: DISCONTINUED | OUTPATIENT
Start: 2024-03-12 | End: 2024-03-12 | Stop reason: HOSPADM

## 2024-03-12 RX ORDER — NALOXONE HYDROCHLORIDE 0.4 MG/ML
INJECTION, SOLUTION INTRAMUSCULAR; INTRAVENOUS; SUBCUTANEOUS PRN
Status: DISCONTINUED | OUTPATIENT
Start: 2024-03-12 | End: 2024-03-12 | Stop reason: HOSPADM

## 2024-03-12 RX ORDER — FENTANYL CITRATE 50 UG/ML
INJECTION, SOLUTION INTRAMUSCULAR; INTRAVENOUS PRN
Status: DISCONTINUED | OUTPATIENT
Start: 2024-03-12 | End: 2024-03-12 | Stop reason: SDUPTHER

## 2024-03-12 RX ORDER — POTASSIUM CHLORIDE 7.45 MG/ML
10 INJECTION INTRAVENOUS
Status: DISPENSED | OUTPATIENT
Start: 2024-03-12 | End: 2024-03-12

## 2024-03-12 RX ORDER — IODIXANOL 320 MG/ML
INJECTION, SOLUTION INTRAVASCULAR
Status: DISPENSED
Start: 2024-03-12 | End: 2024-03-12

## 2024-03-12 RX ORDER — HEPARIN SODIUM 1000 [USP'U]/ML
INJECTION, SOLUTION INTRAVENOUS; SUBCUTANEOUS PRN
Status: DISCONTINUED | OUTPATIENT
Start: 2024-03-12 | End: 2024-03-12 | Stop reason: SDUPTHER

## 2024-03-12 RX ORDER — SODIUM CHLORIDE, SODIUM LACTATE, POTASSIUM CHLORIDE, CALCIUM CHLORIDE 600; 310; 30; 20 MG/100ML; MG/100ML; MG/100ML; MG/100ML
INJECTION, SOLUTION INTRAVENOUS CONTINUOUS PRN
Status: DISCONTINUED | OUTPATIENT
Start: 2024-03-12 | End: 2024-03-12 | Stop reason: SDUPTHER

## 2024-03-12 RX ADMIN — SUGAMMADEX 200 MG: 100 INJECTION, SOLUTION INTRAVENOUS at 12:22

## 2024-03-12 RX ADMIN — POTASSIUM CHLORIDE 10 MEQ: 7.46 INJECTION, SOLUTION INTRAVENOUS at 14:35

## 2024-03-12 RX ADMIN — METRONIDAZOLE 500 MG: 500 INJECTION, SOLUTION INTRAVENOUS at 10:33

## 2024-03-12 RX ADMIN — HYDROCODONE BITARTRATE AND ACETAMINOPHEN 1 TABLET: 5; 325 TABLET ORAL at 04:47

## 2024-03-12 RX ADMIN — POTASSIUM CHLORIDE 10 MEQ: 7.46 INJECTION, SOLUTION INTRAVENOUS at 18:26

## 2024-03-12 RX ADMIN — HEPARIN SODIUM 18 UNITS/KG/HR: 10000 INJECTION, SOLUTION INTRAVENOUS at 22:09

## 2024-03-12 RX ADMIN — Medication 2000 MG: at 23:58

## 2024-03-12 RX ADMIN — HYDROCODONE BITARTRATE AND ACETAMINOPHEN 1 TABLET: 5; 325 TABLET ORAL at 16:10

## 2024-03-12 RX ADMIN — Medication 2000 MG: at 06:38

## 2024-03-12 RX ADMIN — SODIUM CHLORIDE, POTASSIUM CHLORIDE, SODIUM LACTATE AND CALCIUM CHLORIDE: 600; 310; 30; 20 INJECTION, SOLUTION INTRAVENOUS at 11:31

## 2024-03-12 RX ADMIN — ROCURONIUM BROMIDE 50 MG: 10 INJECTION, SOLUTION INTRAVENOUS at 11:38

## 2024-03-12 RX ADMIN — TIOTROPIUM BROMIDE INHALATION SPRAY 2 PUFF: 3.12 SPRAY, METERED RESPIRATORY (INHALATION) at 09:00

## 2024-03-12 RX ADMIN — HEPARIN SODIUM 6000 UNITS: 1000 INJECTION INTRAVENOUS; SUBCUTANEOUS at 12:01

## 2024-03-12 RX ADMIN — BUDESONIDE AND FORMOTEROL FUMARATE DIHYDRATE 2 PUFF: 160; 4.5 AEROSOL RESPIRATORY (INHALATION) at 21:44

## 2024-03-12 RX ADMIN — POTASSIUM CHLORIDE 10 MEQ: 7.46 INJECTION, SOLUTION INTRAVENOUS at 09:05

## 2024-03-12 RX ADMIN — LOSARTAN POTASSIUM 25 MG: 50 TABLET, FILM COATED ORAL at 22:05

## 2024-03-12 RX ADMIN — Medication 2000 MG: at 15:52

## 2024-03-12 RX ADMIN — POTASSIUM CHLORIDE 10 MEQ: 7.46 INJECTION, SOLUTION INTRAVENOUS at 07:47

## 2024-03-12 RX ADMIN — FENTANYL CITRATE 50 MCG: 50 INJECTION, SOLUTION INTRAMUSCULAR; INTRAVENOUS at 12:08

## 2024-03-12 RX ADMIN — ONDANSETRON 4 MG: 2 INJECTION INTRAMUSCULAR; INTRAVENOUS at 12:17

## 2024-03-12 RX ADMIN — METRONIDAZOLE 500 MG: 500 INJECTION, SOLUTION INTRAVENOUS at 18:35

## 2024-03-12 RX ADMIN — POTASSIUM CHLORIDE 10 MEQ: 7.46 INJECTION, SOLUTION INTRAVENOUS at 15:58

## 2024-03-12 RX ADMIN — BUDESONIDE AND FORMOTEROL FUMARATE DIHYDRATE 2 PUFF: 160; 4.5 AEROSOL RESPIRATORY (INHALATION) at 09:01

## 2024-03-12 RX ADMIN — POTASSIUM CHLORIDE, DEXTROSE MONOHYDRATE AND SODIUM CHLORIDE: 150; 5; 450 INJECTION, SOLUTION INTRAVENOUS at 14:33

## 2024-03-12 RX ADMIN — LIDOCAINE HYDROCHLORIDE 50 MG: 10 INJECTION, SOLUTION EPIDURAL; INFILTRATION; INTRACAUDAL; PERINEURAL at 11:38

## 2024-03-12 RX ADMIN — POTASSIUM CHLORIDE 10 MEQ: 7.46 INJECTION, SOLUTION INTRAVENOUS at 10:19

## 2024-03-12 RX ADMIN — PROPOFOL 100 MG: 10 INJECTION, EMULSION INTRAVENOUS at 11:38

## 2024-03-12 RX ADMIN — FENTANYL CITRATE 25 MCG: 50 INJECTION, SOLUTION INTRAMUSCULAR; INTRAVENOUS at 11:38

## 2024-03-12 RX ADMIN — METRONIDAZOLE 500 MG: 500 INJECTION, SOLUTION INTRAVENOUS at 02:04

## 2024-03-12 ASSESSMENT — PAIN SCALES - GENERAL
PAINLEVEL_OUTOF10: 5
PAINLEVEL_OUTOF10: 4

## 2024-03-12 ASSESSMENT — PAIN DESCRIPTION - LOCATION
LOCATION: ABDOMEN
LOCATION: ABDOMEN
LOCATION: ABDOMEN;LEG
LOCATION: ABDOMEN

## 2024-03-12 ASSESSMENT — PAIN DESCRIPTION - DESCRIPTORS
DESCRIPTORS: ACHING;DISCOMFORT
DESCRIPTORS: ACHING

## 2024-03-12 ASSESSMENT — PAIN DESCRIPTION - ORIENTATION
ORIENTATION: RIGHT;MID
ORIENTATION: RIGHT;MID

## 2024-03-12 NOTE — PROGRESS NOTES
abdominal wall mesh  Explant of abdominal wall mesh x2  Muscle fascia debridement ~20 sq cm  Placement of wound vac >50sqcm    3/11/24    Acute deep vein thrombosis in the right external iliac, femoral, popliteal, gastrocnemius, posterior tibial, peroneal veins.    No evidence of deep vein or superficial vein thrombosis in the left lower extremity.    PLAN    N.p.o. for thrombectomy of DVT today.  Patient can resume regular diet after surgery  PT/OT. SNF/rehab on discharge, pending placement per   Continue to encourage ambulation, continue to monitor bowel movement.  WOCN for vac change yesterday, will change again on Wednesday  Continue abx therapy for colocutaneous fistula and abdominal wall abscess present at time of surgery  Vascular surgery is consulted for right lower extremity DVT.  Heparin drip is prescribed for DVT, will transition to oral anticoagulation after thrombectomy per vascular team.  Supportive care    Dispo: Anticipate to discharge patient in next 24 or 48 hours after mechanical thrombectomy of right lower extremity.  Follow-up vascular surgery recommendation choice of medication of oral anticoagulation for DVT.  From surgery standpoint, patient can be discharged after there placement is ready, and VAC supplies ready.      Electronically signed by Mayito Jose DO on 3/12/2024 at 8:05 AM

## 2024-03-12 NOTE — ANESTHESIA POSTPROCEDURE EVALUATION
Department of Anesthesiology  Postprocedure Note    Patient: Irais Max  MRN: [id number]  YOB: 1955  Date of evaluation: 3/12/2024    Procedure Summary       Date: 03/12/24 Room / Location: [place] [address] / [facility]    Anesthesia Start: 1131 Anesthesia Stop: 1241    Procedure: RIGHT MECHANICAL THROMBECTOMY OF LOWER EXTREMITY (Right: Leg Lower) Diagnosis:       DVT (deep vein thrombosis) in pregnancy      (DVT (deep vein thrombosis) in pregnancy [O22.30])    Surgeons: Delos Reyes, Arthur, MD Responsible Provider: Darren Steele MD    Anesthesia Type: general ASA Status: 3            Anesthesia Type: No value filed.    Patricio Phase I: Patricio Score: 9    Patricio Phase II:      Anesthesia Post Evaluation    Patient location during evaluation: PACU  Patient participation: complete - patient participated  Level of consciousness: awake  Pain score: 1  Airway patency: patent  Nausea & Vomiting: no nausea and no vomiting  Cardiovascular status: blood pressure returned to baseline and hemodynamically stable  Respiratory status: acceptable  Hydration status: euvolemic  Pain management: adequate        No notable events documented.

## 2024-03-12 NOTE — CARE COORDINATION
Transitional planning    [facility] auth pending #[id number] for [facility].          1420 call from Lauren with [facility] , insurance is attempting to deny, requesting peer to peer, number to call [phone] option 5 to be done by 9am tomorrow, PS sent to Dr Purdy.          1600 call from Nicol with [facility], denying placement. If patient chooses to appeal, she's to call [phone], if she wants to open a Medicaid case she's to call [phone]. Any needed faxed clinicals to go to [phone]. Provided info to patient.      1630 writer to room discussed with patient assisted her with calling appeal line, faxed updated clinicals.

## 2024-03-12 NOTE — PLAN OF CARE
Problem: Discharge Planning  Goal: Discharge to home or other facility with appropriate resources  3/11/2024 2233 by Alex De La Torre RN  Outcome: Progressing  Flowsheets (Taken 3/11/2024 1938)  Discharge to home or other facility with appropriate resources: Identify barriers to discharge with patient and caregiver  3/11/2024 1805 by Anna Perez RN  Outcome: Progressing     Problem: Chronic Conditions and Co-morbidities  Goal: Patient's chronic conditions and co-morbidity symptoms are monitored and maintained or improved  3/11/2024 2233 by Alex De La Torre RN  Outcome: Progressing  Flowsheets (Taken 3/11/2024 1938)  Care Plan - Patient's Chronic Conditions and Co-Morbidity Symptoms are Monitored and Maintained or Improved: Monitor and assess patient's chronic conditions and comorbid symptoms for stability, deterioration, or improvement  3/11/2024 1805 by Anna Perez RN  Outcome: Progressing     Problem: Pain  Goal: Verbalizes/displays adequate comfort level or baseline comfort level  3/11/2024 2233 by Aelx De La Torre RN  Outcome: Progressing  Flowsheets (Taken 3/11/2024 1930)  Verbalizes/displays adequate comfort level or baseline comfort level: Encourage patient to monitor pain and request assistance  3/11/2024 1805 by Anna Perez RN  Outcome: Progressing     Problem: Safety - Adult  Goal: Free from fall injury  3/11/2024 2233 by Alex De La Torre RN  Outcome: Progressing  Flowsheets (Taken 3/11/2024 2000)  Free From Fall Injury: Instruct family/caregiver on patient safety  3/11/2024 1805 by Anna Perez RN  Outcome: Progressing     Problem: Respiratory - Adult  Goal: Achieves optimal ventilation and oxygenation  3/11/2024 2233 by Alex De La Torre RN  Outcome: Progressing  Flowsheets (Taken 3/11/2024 1938)  Achieves optimal ventilation and oxygenation: Assess for changes in respiratory status  3/11/2024 1805 by Anna Perez RN  Outcome: Progressing     Problem: ABCDS Injury

## 2024-03-12 NOTE — ANESTHESIA PRE PROCEDURE
Nebulization Q6H PRN Christo Purdy, DO        albuterol sulfate HFA (PROVENTIL;VENTOLIN;PROAIR) 108 (90 Base) MCG/ACT inhaler 1 puff  1 puff Inhalation Q4H PRN Christo Purdy DO        labetalol (NORMODYNE;TRANDATE) injection 10 mg  10 mg IntraVENous Q4H PRN Christo Purdy,         morphine injection 4 mg  4 mg IntraVENous Q2H PRN Christo Purdy, DO   4 mg at 03/11/24 1023    Or    morphine (PF) injection 6 mg  6 mg IntraVENous Q2H PRN Christo Purdy DO   6 mg at 03/06/24 2123    sodium chloride flush 0.9 % injection 10 mL  10 mL IntraVENous 2 times per day Christo Purdy DO   10 mL at 03/11/24 2122    sodium chloride flush 0.9 % injection 10 mL  10 mL IntraVENous PRN Christo Purdy, DO   10 mL at 03/07/24 0617    0.9 % sodium chloride infusion   IntraVENous PRN Christo Purdy, DO   Stopped at 03/09/24 0423    potassium chloride 20 mEq/50 mL IVPB (Central Line)  20 mEq IntraVENous PRN Christo Purdy DO        Or    potassium chloride 10 mEq/100 mL IVPB (Peripheral Line)  10 mEq IntraVENous PRN Christo Purdy DO        magnesium sulfate 2000 mg in 50 mL IVPB premix  2,000 mg IntraVENous PRN Christo Purdy DO        [Held by provider] enoxaparin (LOVENOX) injection 40 mg  40 mg SubCUTAneous Daily Christo Purdy DO   40 mg at 03/07/24 0854    ondansetron (ZOFRAN) injection 4 mg  4 mg IntraVENous Q6H PRN Christo Purdy DO        acetaminophen (TYLENOL) tablet 650 mg  650 mg Oral Q4H PRN Christo Purdy DO   650 mg at 03/11/24 0200    losartan (COZAAR) tablet 25 mg  25 mg Oral Nightly Christo Purdy DO   25 mg at 03/11/24 2120    furosemide (LASIX) tablet 10 mg  10 mg Oral Daily Christo Purdy, DO   10 mg at 03/11/24 0852    cyclobenzaprine (FLEXERIL) tablet 5 mg  5 mg Oral TID PRN Christo Purdy DO   5 mg at 03/09/24 1749    budesonide-formoterol (SYMBICORT) 160-4.5 MCG/ACT inhaler 2 puff  2 puff Inhalation BID RT Christo Purdy, DO   2 puff at 03/11/24 2018    And    tiotropium (SPIRIVA RESPIMAT) 2.5 MCG/ACT inhaler 2 puff  2 puff Inhalation

## 2024-03-12 NOTE — OP NOTE
Operative Note      Patient: Irais Max  YOB: 1955  MRN: [id number]    Date of Procedure: 3/12/2024    Pre-Op Diagnosis Codes:     * DVT (deep vein thrombosis) in pregnancy [O22.30]    Post-Op Diagnosis: Same       Procedure(s):  RIGHT MECHANICAL THROMBECTOMY OF LOWER EXTREMITY, percutaneous    Surgeon(s):  Delos Reyes, Arthur, MD    Assistant:   * No surgical staff found *    Anesthesia: General    Estimated Blood Loss (mL): Minimal    Complications: None    Specimens:   * No specimens in log *    Implants:  * No implants in log *      Drains:   Negative Pressure Wound Therapy Abdomen Lower;Medial (Active)   Wound Type Surgical 03/12/24 0800   Unit Type VAC ulta 03/12/24 0800   Dressing Type White Foam;Black Foam 03/12/24 0800   Number of pieces used 3 03/11/24 1120   Number of pieces removed 3 03/11/24 1120   Cycle Continuous 03/12/24 0800   Target Pressure (mmHg) 125 03/12/24 0800   Canister changed? Yes 03/12/24 0400   Dressing Status Clean, dry & intact 03/12/24 0800   Dressing Changed Changed/New 03/11/24 1120   Drainage Amount Scant 03/12/24 0800   Drainage Description Serosanguinous 03/12/24 0800   Dressing Change Due 03/13/24 03/12/24 0800   Output (ml) 200 ml 03/11/24 1600   Wound Assessment Other (Comment) 03/12/24 0800   Mireille-wound Assessment Other (Comment) 03/12/24 0800   Odor None 03/12/24 0400       [REMOVED] NG/OG/NJ/NE Tube Nasogastric 18 fr Right nostril (Removed)   Surrounding Skin Clean, dry & intact 03/08/24 1600   Securement device Adhesive based mares 03/08/24 1600   Status Clamped 03/08/24 1600   Placement Verified External Catheter Length 03/08/24 1600   NG/OG/NJ/NE External Measurement (cm) 58 cm 03/08/24 1600   Drainage Appearance Bile 03/08/24 0415   Output (mL) 0 ml 03/08/24 0415   Action Taken Placement verified (comment) 03/08/24 1600   Residual Volume (ml) 0 ml 03/07/24 1400       [REMOVED] Urinary Catheter 2 Way (Removed)   Catheter Indications Perioperative use for  cava and AngioJet percutaneous thrombectomy was performed.  After several passes were made a clot John device was used along the entire length of the above-knee popliteal femoral common femoral and iliac venous system.  Multiple passes were made and upon repeat venography there is essentially complete resolution of the thrombus in the common femoral and iliac system.  Balloon angioplasty was performed with an 8 mm x 60 mm balloon along the entire length of the femoral vein and there was some residual filling defects but this does not appear flow-limiting.  The wire and sheath was removed and pressure was held at the site.  A light compression dressing was then applied.  The procedure was tolerated well without complication.    Electronically signed by Arthur Delos Reyes, MD on 3/12/2024 at 12:24 PM

## 2024-03-12 NOTE — PROGRESS NOTES
Division of Vascular Surgery             Progress Note      Name: Irais Max  MRN: [id number]         Overnight Events:      No acute events overnight      Subjective:     Patient seen at bedside overnight. Patient complaining of abdominal pain around incision and improved leg pain on right side. Pt denied feeling short of breath. Discussion was had about mechanical thrombectomy scheduled for today.     Physical Exam:     Vitals:  BP (!) 119/47   Pulse 75   Temp 97.7 °F (36.5 °C) (Oral)   Resp 20   Ht 1.524 m (5')   Wt 101.7 kg (224 lb 3.3 oz)   SpO2 91%   BMI 43.79 kg/m²     Physical Exam  Constitutional:       General: She is not in acute distress.     Appearance: Normal appearance. She is obese. She is not ill-appearing, toxic-appearing or diaphoretic.   HENT:      Head: Normocephalic and atraumatic.      Right Ear: External ear normal.      Left Ear: External ear normal.      Nose: Nose normal.      Mouth/Throat:      Mouth: Mucous membranes are moist.      Pharynx: Oropharynx is clear.   Eyes:      General: No scleral icterus.     Conjunctiva/sclera: Conjunctivae normal.   Cardiovascular:      Rate and Rhythm: Normal rate and regular rhythm.      Pulses: Normal pulses.      Comments: B/L Palpable DP/PT pulses  Pulmonary:      Effort: Pulmonary effort is normal. No respiratory distress.   Abdominal:      General: There is no distension.      Tenderness: There is abdominal tenderness. There is no guarding.      Comments: Midline surgical wound with wound vac   Musculoskeletal:         General: Swelling and tenderness present. No deformity or signs of injury.      Cervical back: No rigidity.      Right lower leg: Edema present.      Left lower leg: No edema.      Comments: Right thigh, Right calf, right foot swelling.    Skin:     General: Skin is warm and dry.      Coloration: Skin is not jaundiced.   Neurological:      Mental Status: She is alert and oriented to person, place, and time. Mental status  is at baseline.   Psychiatric:         Thought Content: Thought content normal.     Imaging:     No results found.       Assessment:     67 yo female with acute DVT extending from external iliac to the distal named veins of the foot       Plan:     Planning for mechanical thrombectomy of right lower extremity  Planned procedure explained in detail including discussion of all associated risks/benefits.  All questions/concerns were addressed and informed consent was obtained, witnessed by nurse, and placed on the pt's chart.   Recommend continuing heparin gtt  Continue compression wrappings.   Further plan pending operative findings.      [facility] [place]  O: [phone]

## 2024-03-12 NOTE — PROGRESS NOTES
Received post procedure to [facility] to room 9. Assessment obtained. Restrictions reviewed with patient. Post procedure pathway initiated.  Right lower leg ace dressing dry and intact .Patient without complaints.

## 2024-03-12 NOTE — PROGRESS NOTES
Occupational Therapy    [facility]  Occupational Therapy Not Seen Note    DATE: 3/12/2024    NAME: Irais Max  MRN: [id number]   : 1955      Patient not seen this date for Occupational Therapy due to:    Surgery/Procedure: mechanical thrombectomy of right lower extremity     Next Scheduled Treatment: 3/13    Electronically signed by BROOKS Gill on 3/12/2024 at 8:58 AM

## 2024-03-12 NOTE — PLAN OF CARE
Problem: Discharge Planning  Goal: Discharge to home or other facility with appropriate resources  3/12/2024 0802 by Yasmin Perez RN  Outcome: Progressing  3/11/2024 2233 by Alex De La Torre RN  Outcome: Progressing  Flowsheets (Taken 3/11/2024 1938)  Discharge to home or other facility with appropriate resources: Identify barriers to discharge with patient and caregiver  3/11/2024 1805 by Anna Perez RN  Outcome: Progressing     Problem: Chronic Conditions and Co-morbidities  Goal: Patient's chronic conditions and co-morbidity symptoms are monitored and maintained or improved  3/12/2024 0802 by Yasmin Perez RN  Outcome: Progressing  3/11/2024 2233 by Alex De La Torre RN  Outcome: Progressing  Flowsheets (Taken 3/11/2024 1938)  Care Plan - Patient's Chronic Conditions and Co-Morbidity Symptoms are Monitored and Maintained or Improved: Monitor and assess patient's chronic conditions and comorbid symptoms for stability, deterioration, or improvement  3/11/2024 1805 by Anna Perez RN  Outcome: Progressing     Problem: Pain  Goal: Verbalizes/displays adequate comfort level or baseline comfort level  3/12/2024 0802 by Yasmin Perez RN  Outcome: Progressing  Flowsheets (Taken 3/12/2024 0323 by Alex De La Torre RN)  Verbalizes/displays adequate comfort level or baseline comfort level: Encourage patient to monitor pain and request assistance  3/11/2024 2233 by Alex De La Torre RN  Outcome: Progressing  Flowsheets (Taken 3/11/2024 1930)  Verbalizes/displays adequate comfort level or baseline comfort level: Encourage patient to monitor pain and request assistance  3/11/2024 1805 by Anna Perez RN  Outcome: Progressing     Problem: Safety - Adult  Goal: Free from fall injury  3/12/2024 0802 by Yasmin Perez RN  Outcome: Progressing  3/11/2024 2233 by Alex De La Torre RN  Outcome: Progressing  Flowsheets (Taken 3/11/2024 2000)  Free From Fall Injury: Instruct family/caregiver on patient

## 2024-03-13 LAB
ANION GAP SERPL CALCULATED.3IONS-SCNC: 8 MMOL/L (ref 9–17)
ANTI-XA UNFRAC HEPARIN: 0.41 IU/L
BASOPHILS # BLD: 0 K/UL (ref 0–0.2)
BASOPHILS NFR BLD: 0 % (ref 0–2)
BUN SERPL-MCNC: 4 MG/DL (ref 8–23)
CALCIUM SERPL-MCNC: 9.2 MG/DL (ref 8.6–10.4)
CHLORIDE SERPL-SCNC: 102 MMOL/L (ref 98–107)
CO2 SERPL-SCNC: 28 MMOL/L (ref 20–31)
CREAT SERPL-MCNC: 0.5 MG/DL (ref 0.5–0.9)
EOSINOPHIL # BLD: 0.58 K/UL (ref 0–0.4)
EOSINOPHILS RELATIVE PERCENT: 5 % (ref 1–4)
ERYTHROCYTE [DISTWIDTH] IN BLOOD BY AUTOMATED COUNT: 14.4 % (ref 11.8–14.4)
GFR SERPL CREATININE-BSD FRML MDRD: >60 ML/MIN/1.73M2
GLUCOSE SERPL-MCNC: 146 MG/DL (ref 74–99)
HCT VFR BLD AUTO: 36.7 % (ref 36.3–47.1)
HGB BLD-MCNC: 10.7 G/DL (ref 11.9–15.1)
IMM GRANULOCYTES # BLD AUTO: 0.46 K/UL (ref 0–0.3)
IMM GRANULOCYTES NFR BLD: 4 %
LYMPHOCYTES NFR BLD: 1.16 K/UL (ref 1–4.8)
LYMPHOCYTES RELATIVE PERCENT: 10 % (ref 24–44)
MCH RBC QN AUTO: 28.1 PG (ref 25.2–33.5)
MCHC RBC AUTO-ENTMCNC: 29.2 G/DL (ref 28.4–34.8)
MCV RBC AUTO: 96.3 FL (ref 82.6–102.9)
MONOCYTES NFR BLD: 0.23 K/UL (ref 0.1–0.8)
MONOCYTES NFR BLD: 2 % (ref 1–7)
MORPHOLOGY: ABNORMAL
NEUTROPHILS NFR BLD: 79 % (ref 36–66)
NEUTS SEG NFR BLD: 9.17 K/UL (ref 1.8–7.7)
NRBC BLD-RTO: 0 PER 100 WBC
PLATELET # BLD AUTO: 254 K/UL (ref 138–453)
PLATELET, FLUORESCENCE: 254 K/UL (ref 138–453)
PMV BLD AUTO: 9.8 FL (ref 8.1–13.5)
POTASSIUM SERPL-SCNC: 4.2 MMOL/L (ref 3.7–5.3)
RBC # BLD AUTO: 3.81 M/UL (ref 3.95–5.11)
SODIUM SERPL-SCNC: 138 MMOL/L (ref 136–145)
WBC OTHER # BLD: 11.6 K/UL (ref 3.5–11.3)

## 2024-03-13 PROCEDURE — 6370000000 HC RX 637 (ALT 250 FOR IP): Performed by: SURGERY

## 2024-03-13 PROCEDURE — 85520 HEPARIN ASSAY: CPT

## 2024-03-13 PROCEDURE — 1200000000 HC SEMI PRIVATE

## 2024-03-13 PROCEDURE — 85025 COMPLETE CBC W/AUTO DIFF WBC: CPT

## 2024-03-13 PROCEDURE — 80048 BASIC METABOLIC PNL TOTAL CA: CPT

## 2024-03-13 PROCEDURE — 94640 AIRWAY INHALATION TREATMENT: CPT

## 2024-03-13 PROCEDURE — 97606 NEG PRS WND THER DME>50 SQCM: CPT

## 2024-03-13 PROCEDURE — 94761 N-INVAS EAR/PLS OXIMETRY MLT: CPT

## 2024-03-13 PROCEDURE — 6360000002 HC RX W HCPCS: Performed by: SURGERY

## 2024-03-13 PROCEDURE — 2580000003 HC RX 258: Performed by: SURGERY

## 2024-03-13 PROCEDURE — 6370000000 HC RX 637 (ALT 250 FOR IP)

## 2024-03-13 PROCEDURE — 2700000000 HC OXYGEN THERAPY PER DAY

## 2024-03-13 PROCEDURE — 36415 COLL VENOUS BLD VENIPUNCTURE: CPT

## 2024-03-13 PROCEDURE — 99024 POSTOP FOLLOW-UP VISIT: CPT | Performed by: SURGERY

## 2024-03-13 RX ORDER — CEPHALEXIN 500 MG/1
500 CAPSULE ORAL EVERY 6 HOURS SCHEDULED
Status: COMPLETED | OUTPATIENT
Start: 2024-03-13 | End: 2024-03-16

## 2024-03-13 RX ORDER — METRONIDAZOLE 500 MG/1
500 TABLET ORAL EVERY 8 HOURS SCHEDULED
Status: DISCONTINUED | OUTPATIENT
Start: 2024-03-13 | End: 2024-03-16 | Stop reason: HOSPADM

## 2024-03-13 RX ADMIN — APIXABAN 10 MG: 5 TABLET, FILM COATED ORAL at 21:41

## 2024-03-13 RX ADMIN — HYDROCODONE BITARTRATE AND ACETAMINOPHEN 2 TABLET: 5; 325 TABLET ORAL at 15:58

## 2024-03-13 RX ADMIN — Medication 2000 MG: at 14:07

## 2024-03-13 RX ADMIN — MORPHINE SULFATE 4 MG: 4 INJECTION INTRAVENOUS at 09:49

## 2024-03-13 RX ADMIN — METRONIDAZOLE 500 MG: 500 INJECTION, SOLUTION INTRAVENOUS at 02:25

## 2024-03-13 RX ADMIN — Medication 2000 MG: at 06:24

## 2024-03-13 RX ADMIN — APIXABAN 10 MG: 5 TABLET, FILM COATED ORAL at 07:57

## 2024-03-13 RX ADMIN — BUDESONIDE AND FORMOTEROL FUMARATE DIHYDRATE 2 PUFF: 160; 4.5 AEROSOL RESPIRATORY (INHALATION) at 08:43

## 2024-03-13 RX ADMIN — SODIUM CHLORIDE, PRESERVATIVE FREE 10 ML: 5 INJECTION INTRAVENOUS at 07:58

## 2024-03-13 RX ADMIN — HYDROCODONE BITARTRATE AND ACETAMINOPHEN 1 TABLET: 5; 325 TABLET ORAL at 04:11

## 2024-03-13 RX ADMIN — TIOTROPIUM BROMIDE INHALATION SPRAY 2 PUFF: 3.12 SPRAY, METERED RESPIRATORY (INHALATION) at 09:24

## 2024-03-13 RX ADMIN — METRONIDAZOLE 500 MG: 500 TABLET ORAL at 21:41

## 2024-03-13 RX ADMIN — LOSARTAN POTASSIUM 25 MG: 50 TABLET, FILM COATED ORAL at 21:41

## 2024-03-13 RX ADMIN — METRONIDAZOLE 500 MG: 500 INJECTION, SOLUTION INTRAVENOUS at 17:18

## 2024-03-13 RX ADMIN — FUROSEMIDE 10 MG: 20 TABLET ORAL at 07:57

## 2024-03-13 RX ADMIN — BUDESONIDE AND FORMOTEROL FUMARATE DIHYDRATE 2 PUFF: 160; 4.5 AEROSOL RESPIRATORY (INHALATION) at 21:22

## 2024-03-13 RX ADMIN — HYDROCODONE BITARTRATE AND ACETAMINOPHEN 1 TABLET: 5; 325 TABLET ORAL at 00:16

## 2024-03-13 RX ADMIN — CEPHALEXIN 500 MG: 500 CAPSULE ORAL at 21:41

## 2024-03-13 RX ADMIN — METRONIDAZOLE 500 MG: 500 INJECTION, SOLUTION INTRAVENOUS at 09:05

## 2024-03-13 ASSESSMENT — PAIN SCALES - GENERAL
PAINLEVEL_OUTOF10: 0
PAINLEVEL_OUTOF10: 6
PAINLEVEL_OUTOF10: 7
PAINLEVEL_OUTOF10: 0
PAINLEVEL_OUTOF10: 5
PAINLEVEL_OUTOF10: 7

## 2024-03-13 ASSESSMENT — PAIN DESCRIPTION - ORIENTATION
ORIENTATION: MID

## 2024-03-13 ASSESSMENT — PAIN DESCRIPTION - LOCATION
LOCATION: ABDOMEN

## 2024-03-13 ASSESSMENT — PAIN - FUNCTIONAL ASSESSMENT
PAIN_FUNCTIONAL_ASSESSMENT: ACTIVITIES ARE NOT PREVENTED
PAIN_FUNCTIONAL_ASSESSMENT: PREVENTS OR INTERFERES SOME ACTIVE ACTIVITIES AND ADLS
PAIN_FUNCTIONAL_ASSESSMENT: ACTIVITIES ARE NOT PREVENTED

## 2024-03-13 ASSESSMENT — PAIN DESCRIPTION - DESCRIPTORS
DESCRIPTORS: ACHING

## 2024-03-13 NOTE — PROGRESS NOTES
Reason for C Nurse Evaluation and Assessment:   NPWT dressing changes to abdominal incision.     Wound History:   3/6/2024 : Exploratory laparotomy, Takedown transverse colocutaneous fistula, Takedown of small bowel adhesions to abdominal wall mesh  Explant of abdominal wall mesh x2, Muscle fascia debridement ~20 sq cm, placement of wound vac >50sqcm with  for abdominal wall abscess secondary to transverse colocutaneous fistula, fascial dehiscence, early signs of fistulization of small bowel to mesh x2  Current Wound Care Treatment:  NPWT at -125 mmHg         03/13/24 1056   Negative Pressure Wound Therapy Abdomen Lower;Medial   No placement date or time found.   Location: Abdomen  Wound Location Orientation: Lower;Medial   Wound Type Surgical   Unit Type VAC ulta   Dressing Type White Foam;Black Foam   Number of pieces used 3   Number of pieces removed 3   Cycle Continuous;Off   Target Pressure (mmHg) 125   Canister changed? No   Dressing Status New dressing applied   Dressing Changed (S)  Changed/New   Drainage Amount Small   Drainage Description Serosanguinous   Dressing Change Due 03/15/24   Wound Assessment Exposed structure fascia;Subcutaneous;Granulation tissue;Slough  (fascial sutures to base with scant fibrinous slough exudate;)   Mireille-wound Assessment Intact  (barrier film spray applied)   Incision 03/06/24 Abdomen Lower;Medial   Date First Assessed/Time First Assessed: 03/06/24 1409   Present on Original Admission: No  Location: Abdomen  Incision Location Orientation: Lower;Medial   Wound Image    Dressing Status New dressing applied   Dressing Change Due 03/15/24   Incision Cleansed Cleansed with saline   Dressing/Treatment Negative pressure wound therapy   Incision Length (cm) 16   Incision Width (cm) 5 cm   Incision Depth (cm) 3 cm   Incision Volume (cm^3) 240 cm^3   Incision Assessment Other (Comment)  (fascial sutures; small slough exudate; granulation noted)   Drainage Amount Small (< 25%)

## 2024-03-13 NOTE — PLAN OF CARE
Problem: Respiratory - Adult  Goal: Achieves optimal ventilation and oxygenation  3/13/2024 0848 by Opal Cool RCP  Outcome: Progressing

## 2024-03-13 NOTE — PROGRESS NOTES
[facility] General Surgery Progress Note            PATIENT NAME: Irais Max     TODAY'S DATE: 3/13/2024, 6:38 AM    CC: Colocutaneous fistula    SUBJECTIVE:    Pt seen and examined. No acute events overnight, some residual R knee soreness from thrombectomy but overall feels she is continuing to make progress. No new complaints otherwise.    OBJECTIVE:   Vitals:  /62   Pulse 76   Temp 98.2 °F (36.8 °C) (Oral)   Resp 20   Ht 1.524 m (5')   Wt 101.7 kg (224 lb 3.3 oz)   SpO2 96% Comment: 1L NC  BMI 43.79 kg/m²    TEMPERATURE:  Current - Temp: 98.2 °F (36.8 °C); Max - Temp  Av.2 °F (32.9 °C)  Min: 33.8 °F (1 °C)  Max: 98.7 °F (37.1 °C)    INTAKE/OUTPUT:      Intake/Output Summary (Last 24 hours) at 3/13/2024 0638  Last data filed at 3/12/2024 0741  Gross per 24 hour   Intake --   Output 1 ml   Net -1 ml                 General: AOx3, NAD  Lungs: Symmetrical chest rise bilaterally, no audible wheezes or rhonchi  Heart: S1S2  Abdomen: Soft, ND, midline vac without leak, no output in vac container.   Extremity: moves all extremities x4, No edema. RLE with dressing intact.      Data Review:  CBC:   Recent Labs     24  0410   WBC 14.2*   HGB 11.3*        BMP:    Recent Labs     24  0410      K 3.2*      CO2 29   BUN 6*   CREATININE 0.4*   GLUCOSE 179*     Hepatic: No results for input(s): \"AST\", \"ALT\", \"ALB\", \"ALKPHOS\", \"BILITOT\", \"BILIDIR\" in the last 72 hours.  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

## 2024-03-13 NOTE — PROGRESS NOTES
Division of Vascular Surgery             Progress Note      Name: Irais Max  MRN: [id number]         Overnight Events:     Patient was seen and examined at bedside.  No acute vents overnight.  Afebrile vital signs normal limits.  Pain well-controlled.      Subjective:     Irais Max, 70-year-old female status post mechanical thrombectomy of the right lower extremity external iliac DVT.    Physical Exam:     Vitals:  /64   Pulse 84   Temp 98.4 °F (36.9 °C) (Oral)   Resp 16   Ht 1.524 m (5')   Wt 101.7 kg (224 lb 3.3 oz)   SpO2 91%   BMI 43.79 kg/m²       General appearance - alert, well appearing and in no acute distress  Mental status - oriented to person, place and time with normal affect  Head - normocephalic and atraumatic  Neck - supple  Chest - clear to auscultation, normal effort  Heart - normal rate, regular rhythm  Abdomen - soft, non-tender, non-distended  Neurological - normal speech, no focal findings or movement disorder noted  Extremities - peripheral pulses palpable, swelling to the right lower extremity, significant improved from yesterday.  Skin - no gross lesions, rashes, or induration noted  Vascular Exam -palpable bilateral DP and PT pulses.      Imaging:   Vascular duplex lower extremity venous bilateral    Result Date: 3/11/2024    Acute deep vein thrombosis in the right external iliac, femoral, popliteal, gastrocnemius, posterior tibial, peroneal veins.   No evidence of deep vein or superficial vein thrombosis in the left lower extremity.          Assessment:     Irais Max, 68-year-old female who presents with a right-sided iliac DVT status post thrombectomy.      Plan:     Patient is doing well status post thrombectomy of the right lower extremity DVT.  Transition to oral anticoagulation.  Patient will need anticoagulation for at least 3 months.    Vascular Surgery service will sign off at this time.  Thank you for the consult.  Please call/page us if you have any  questions/concerns.  We appreciate being involved in the care of your patient.

## 2024-03-13 NOTE — PROGRESS NOTES
Physical Therapy        Physical Therapy Cancel Note      DATE: 3/13/2024    NAME: Irais Max  MRN: [id number]   : 1955      Patient not seen this date for Physical Therapy due to:    Patient Declined: Pt decline participating in PT today due to being in to much pain from dressing change, will continue to pursue as able.       Electronically signed by Rufino Junior PTA on 3/13/2024 at 2:03 PM

## 2024-03-13 NOTE — PLAN OF CARE
Problem: Discharge Planning  Goal: Discharge to home or other facility with appropriate resources  3/13/2024 0810 by Reina Jose RN  Outcome: Progressing  3/13/2024 0533 by Alex De La Torre RN  Outcome: Progressing  Flowsheets (Taken 3/12/2024 2000)  Discharge to home or other facility with appropriate resources: Identify barriers to discharge with patient and caregiver     Problem: Chronic Conditions and Co-morbidities  Goal: Patient's chronic conditions and co-morbidity symptoms are monitored and maintained or improved  3/13/2024 0810 by Reina Jose RN  Outcome: Progressing  3/13/2024 0533 by Alex De La Torre RN  Outcome: Progressing  Flowsheets (Taken 3/12/2024 2000)  Care Plan - Patient's Chronic Conditions and Co-Morbidity Symptoms are Monitored and Maintained or Improved: Monitor and assess patient's chronic conditions and comorbid symptoms for stability, deterioration, or improvement     Problem: Pain  Goal: Verbalizes/displays adequate comfort level or baseline comfort level  3/13/2024 0810 by Reina Jose RN  Outcome: Progressing  3/13/2024 0533 by Alex De La Torre RN  Outcome: Progressing  Flowsheets  Taken 3/13/2024 0331  Verbalizes/displays adequate comfort level or baseline comfort level: Encourage patient to monitor pain and request assistance  Taken 3/13/2024 0047  Verbalizes/displays adequate comfort level or baseline comfort level: Encourage patient to monitor pain and request assistance  Taken 3/12/2024 2000  Verbalizes/displays adequate comfort level or baseline comfort level: Encourage patient to monitor pain and request assistance     Problem: Safety - Adult  Goal: Free from fall injury  3/13/2024 0810 by Reina Jose RN  Outcome: Progressing  3/13/2024 0533 by Alex De La Torre RN  Outcome: Progressing  Flowsheets (Taken 3/12/2024 2000)  Free From Fall Injury: Instruct family/caregiver on patient safety     Problem: Respiratory - Adult  Goal: Achieves optimal ventilation  and oxygenation  3/13/2024 0810 by Reina Jose RN  Outcome: Progressing  3/13/2024 0533 by Alex De La Torre RN  Outcome: Progressing  Flowsheets (Taken 3/12/2024 2000)  Achieves optimal ventilation and oxygenation: Assess for changes in respiratory status     Problem: ABCDS Injury Assessment  Goal: Absence of physical injury  3/13/2024 0810 by Reina Jose RN  Outcome: Progressing  3/13/2024 0533 by Alex De La Torre RN  Outcome: Progressing  Flowsheets (Taken 3/12/2024 2000)  Absence of Physical Injury: Implement safety measures based on patient assessment     Problem: Skin/Tissue Integrity  Goal: Absence of new skin breakdown  Description: 1.  Monitor for areas of redness and/or skin breakdown  2.  Assess vascular access sites hourly  3.  Every 4-6 hours minimum:  Change oxygen saturation probe site  4.  Every 4-6 hours:  If on nasal continuous positive airway pressure, respiratory therapy assess nares and determine need for appliance change or resting period.  3/13/2024 0810 by Reina Jose RN  Outcome: Progressing  3/13/2024 0533 by Alex De La Torre RN  Outcome: Progressing

## 2024-03-13 NOTE — PLAN OF CARE
Problem: Discharge Planning  Goal: Discharge to home or other facility with appropriate resources  Outcome: Progressing  Flowsheets (Taken 3/12/2024 2000)  Discharge to home or other facility with appropriate resources: Identify barriers to discharge with patient and caregiver     Problem: Chronic Conditions and Co-morbidities  Goal: Patient's chronic conditions and co-morbidity symptoms are monitored and maintained or improved  Outcome: Progressing  Flowsheets (Taken 3/12/2024 2000)  Care Plan - Patient's Chronic Conditions and Co-Morbidity Symptoms are Monitored and Maintained or Improved: Monitor and assess patient's chronic conditions and comorbid symptoms for stability, deterioration, or improvement     Problem: Pain  Goal: Verbalizes/displays adequate comfort level or baseline comfort level  Outcome: Progressing  Flowsheets  Taken 3/13/2024 0331  Verbalizes/displays adequate comfort level or baseline comfort level: Encourage patient to monitor pain and request assistance  Taken 3/13/2024 0047  Verbalizes/displays adequate comfort level or baseline comfort level: Encourage patient to monitor pain and request assistance  Taken 3/12/2024 2000  Verbalizes/displays adequate comfort level or baseline comfort level: Encourage patient to monitor pain and request assistance     Problem: Safety - Adult  Goal: Free from fall injury  Outcome: Progressing  Flowsheets (Taken 3/12/2024 2000)  Free From Fall Injury: Instruct family/caregiver on patient safety     Problem: Respiratory - Adult  Goal: Achieves optimal ventilation and oxygenation  Outcome: Progressing  Flowsheets (Taken 3/12/2024 2000)  Achieves optimal ventilation and oxygenation: Assess for changes in respiratory status     Problem: ABCDS Injury Assessment  Goal: Absence of physical injury  Outcome: Progressing  Flowsheets (Taken 3/12/2024 2000)  Absence of Physical Injury: Implement safety measures based on patient assessment     Problem: Skin/Tissue

## 2024-03-13 NOTE — PROGRESS NOTES
Pharmacy Intern New Medication Counseling Note    Medication counseling provided to Irais Max  New medications reviewed: Apixaban    Handouts provided to patient include: Medication Side Effects brochure, Pill organizer, and contact card for Pharmacy Services Medication Hotline    Discussed recently initiated medication therapy with patient/caregiver utilizing teachback method.  Reviewed uses and possible side effects of medication and answered all medication-related questions.  Patient/caregiver verbalized understanding.    Stefanie Huerta, Pharmacy Intern

## 2024-03-13 NOTE — CARE COORDINATION
Transitional planning    Writer to room to update Artis goddard under appeal and pending for Hayde Hdz , peer to peer was denied as per previous notes. Call to Hayde and melvin Kellogg in admissions.

## 2024-03-14 LAB — ANTI-XA UNFRAC HEPARIN: >2 IU/L

## 2024-03-14 PROCEDURE — 6370000000 HC RX 637 (ALT 250 FOR IP): Performed by: SURGERY

## 2024-03-14 PROCEDURE — 2580000003 HC RX 258: Performed by: SURGERY

## 2024-03-14 PROCEDURE — 1200000000 HC SEMI PRIVATE

## 2024-03-14 PROCEDURE — 6370000000 HC RX 637 (ALT 250 FOR IP)

## 2024-03-14 PROCEDURE — 97535 SELF CARE MNGMENT TRAINING: CPT

## 2024-03-14 PROCEDURE — 36415 COLL VENOUS BLD VENIPUNCTURE: CPT

## 2024-03-14 PROCEDURE — 97110 THERAPEUTIC EXERCISES: CPT

## 2024-03-14 PROCEDURE — 99024 POSTOP FOLLOW-UP VISIT: CPT | Performed by: SURGERY

## 2024-03-14 PROCEDURE — 94640 AIRWAY INHALATION TREATMENT: CPT

## 2024-03-14 PROCEDURE — 85520 HEPARIN ASSAY: CPT

## 2024-03-14 PROCEDURE — 2700000000 HC OXYGEN THERAPY PER DAY

## 2024-03-14 PROCEDURE — 94761 N-INVAS EAR/PLS OXIMETRY MLT: CPT

## 2024-03-14 PROCEDURE — 97530 THERAPEUTIC ACTIVITIES: CPT

## 2024-03-14 RX ADMIN — APIXABAN 10 MG: 5 TABLET, FILM COATED ORAL at 22:00

## 2024-03-14 RX ADMIN — HYDROCODONE BITARTRATE AND ACETAMINOPHEN 1 TABLET: 5; 325 TABLET ORAL at 13:18

## 2024-03-14 RX ADMIN — CEPHALEXIN 500 MG: 500 CAPSULE ORAL at 22:01

## 2024-03-14 RX ADMIN — CEPHALEXIN 500 MG: 500 CAPSULE ORAL at 17:51

## 2024-03-14 RX ADMIN — HYDROCODONE BITARTRATE AND ACETAMINOPHEN 2 TABLET: 5; 325 TABLET ORAL at 21:59

## 2024-03-14 RX ADMIN — FUROSEMIDE 10 MG: 20 TABLET ORAL at 08:26

## 2024-03-14 RX ADMIN — BUDESONIDE AND FORMOTEROL FUMARATE DIHYDRATE 2 PUFF: 160; 4.5 AEROSOL RESPIRATORY (INHALATION) at 20:17

## 2024-03-14 RX ADMIN — LOSARTAN POTASSIUM 25 MG: 50 TABLET, FILM COATED ORAL at 22:02

## 2024-03-14 RX ADMIN — METRONIDAZOLE 500 MG: 500 TABLET ORAL at 04:56

## 2024-03-14 RX ADMIN — SODIUM CHLORIDE, PRESERVATIVE FREE 10 ML: 5 INJECTION INTRAVENOUS at 22:03

## 2024-03-14 RX ADMIN — CEPHALEXIN 500 MG: 500 CAPSULE ORAL at 04:56

## 2024-03-14 RX ADMIN — APIXABAN 10 MG: 5 TABLET, FILM COATED ORAL at 08:30

## 2024-03-14 RX ADMIN — HYDROCODONE BITARTRATE AND ACETAMINOPHEN 1 TABLET: 5; 325 TABLET ORAL at 17:52

## 2024-03-14 RX ADMIN — SODIUM CHLORIDE, PRESERVATIVE FREE 10 ML: 5 INJECTION INTRAVENOUS at 08:27

## 2024-03-14 RX ADMIN — METRONIDAZOLE 500 MG: 500 TABLET ORAL at 14:00

## 2024-03-14 RX ADMIN — CEPHALEXIN 500 MG: 500 CAPSULE ORAL at 12:08

## 2024-03-14 RX ADMIN — HYDROCODONE BITARTRATE AND ACETAMINOPHEN 1 TABLET: 5; 325 TABLET ORAL at 08:26

## 2024-03-14 RX ADMIN — METRONIDAZOLE 500 MG: 500 TABLET ORAL at 22:02

## 2024-03-14 ASSESSMENT — PAIN SCALES - GENERAL
PAINLEVEL_OUTOF10: 3
PAINLEVEL_OUTOF10: 3
PAINLEVEL_OUTOF10: 5
PAINLEVEL_OUTOF10: 4
PAINLEVEL_OUTOF10: 0
PAINLEVEL_OUTOF10: 4

## 2024-03-14 ASSESSMENT — PAIN DESCRIPTION - DESCRIPTORS
DESCRIPTORS: ACHING;DISCOMFORT
DESCRIPTORS: ACHING;DULL
DESCRIPTORS: SHARP;DULL;ACHING

## 2024-03-14 ASSESSMENT — PAIN DESCRIPTION - LOCATION
LOCATION: ABDOMEN;INCISION

## 2024-03-14 ASSESSMENT — PAIN DESCRIPTION - ORIENTATION
ORIENTATION: MID
ORIENTATION: MID

## 2024-03-14 NOTE — PLAN OF CARE
Problem: Respiratory - Adult  Goal: Achieves optimal ventilation and oxygenation  3/13/2024 2128 by Shanthi Headley RCP  Flowsheets (Taken 3/13/2024 2128)  Achieves optimal ventilation and oxygenation: Respiratory therapy support as indicated

## 2024-03-14 NOTE — PROGRESS NOTES
[facility] General Surgery Progress Note            PATIENT NAME: Irais Max     TODAY'S DATE: 3/14/2024, 7:40 AM    CC: Colocutaneous fistula    SUBJECTIVE:    Pt seen and examined. No acute events overnight, she reports her right lower extremity no longer has pain, but it is still swollen.  Unfortunately, physical therapy did not work with patient yesterday due to her pain after her wound VAC was changed.  Otherwise, she had some diarrhea yesterday, continue to pass gas.    OBJECTIVE:   Vitals:  /64   Pulse 72   Temp 98.8 °F (37.1 °C) (Oral)   Resp 16   Ht 1.524 m (5')   Wt 101.7 kg (224 lb 3.3 oz)   SpO2 94%   BMI 43.79 kg/m²    TEMPERATURE:  Current - Temp: 98.8 °F (37.1 °C); Max - Temp  Av.4 °F (36.9 °C)  Min: 98.1 °F (36.7 °C)  Max: 98.8 °F (37.1 °C)    INTAKE/OUTPUT:      Intake/Output Summary (Last 24 hours) at 3/14/2024 0740  Last data filed at 3/14/2024 0600  Gross per 24 hour   Intake --   Output 400 ml   Net -400 ml                   General: AOx3, NAD  Lungs: Symmetrical chest rise bilaterally, no audible wheezes or rhonchi  Heart: S1S2  Abdomen: Soft, ND, midline vac without leak  Extremity: moves all extremities x4, No edema. RLE with dressing intact.      Data Review:  CBC:   Recent Labs     24  0410 24  0828   WBC 14.2* 11.6*   HGB 11.3* 10.7*    254       BMP:    Recent Labs     24  0410 24  0828    138   K 3.2* 4.2    102   CO2 29 28   BUN 6* 4*   CREATININE 0.4* 0.5   GLUCOSE 179* 146*       Hepatic: No results for input(s): \"AST\", \"ALT\", \"ALB\", \"ALKPHOS\", \"BILITOT\", \"BILIDIR\" in the last 72 hours.  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

## 2024-03-14 NOTE — PLAN OF CARE
Problem: Discharge Planning  Goal: Discharge to home or other facility with appropriate resources  Outcome: Progressing     Problem: Chronic Conditions and Co-morbidities  Goal: Patient's chronic conditions and co-morbidity symptoms are monitored and maintained or improved  Outcome: Progressing     Problem: Pain  Goal: Verbalizes/displays adequate comfort level or baseline comfort level  Outcome: Progressing  Flowsheets (Taken 3/14/2024 1615)  Verbalizes/displays adequate comfort level or baseline comfort level:   Encourage patient to monitor pain and request assistance   Assess pain using appropriate pain scale   Administer analgesics based on type and severity of pain and evaluate response   Implement non-pharmacological measures as appropriate and evaluate response   Notify Licensed Independent Practitioner if interventions unsuccessful or patient reports new pain     Problem: Safety - Adult  Goal: Free from fall injury  Outcome: Progressing  Flowsheets (Taken 3/14/2024 1616)  Free From Fall Injury: Instruct family/caregiver on patient safety     Problem: Respiratory - Adult  Goal: Achieves optimal ventilation and oxygenation  Outcome: Progressing     Problem: ABCDS Injury Assessment  Goal: Absence of physical injury  Outcome: Progressing  Flowsheets (Taken 3/14/2024 1616)  Absence of Physical Injury: Implement safety measures based on patient assessment     Problem: Skin/Tissue Integrity  Goal: Absence of new skin breakdown  Description: 1.  Monitor for areas of redness and/or skin breakdown  2.  Assess vascular access sites hourly  3.  Every 4-6 hours minimum:  Change oxygen saturation probe site  4.  Every 4-6 hours:  If on nasal continuous positive airway pressure, respiratory therapy assess nares and determine need for appliance change or resting period.  Outcome: Progressing     Problem: Nutrition Deficit:  Goal: Optimize nutritional status  Outcome: Progressing

## 2024-03-14 NOTE — PROGRESS NOTES
good implementation with functional tasks. Pt required Min A to complete hygiene tasks this date, unable to complete hygiene d/t decreased functional reach and pain with activity. Pt demo's mild balance deficits, requiring RW to safely complete mobility, although requires VCs to use safely during ADLs and increase awareness to obstacles to reduce fall risk with task engagement. Patient would benefit from continued acute OT services to address functional deficits through skilled intervention to promote independence and safety with ADL/IADLs and functional transfers/mobility for safe return to prior living environment and level of function.  Prognosis: Good  Decision Making: Medium Complexity  REQUIRES OT FOLLOW-UP: Yes  Activity Tolerance  Activity Tolerance: Patient Tolerated treatment well;Patient limited by pain        Plan   Occupational Therapy Plan  Times Per Week: 4x/wk  Current Treatment Recommendations: Balance training, Functional mobility training, Endurance training, Pain management, Safety education & training, Patient/Caregiver education & training, Equipment evaluation, education, & procurement, Self-Care / ADL, Home management training, Positioning, Strengthening     Restrictions  Restrictions/Precautions  Restrictions/Precautions: Fall Risk  Required Braces or Orthoses?: Yes  Required Braces or Orthoses  Other: Abdominal Binder (Abdominal binder for comfort/support. Remove when pt is sleeping)  Position Activity Restriction  Other position/activity restrictions: 2 L/O2 PRN, up with assist, s/p ExLap 3/6, hernia repair 2/2    Subjective   General  Patient assessed for rehabilitation services?: Yes  Family / Caregiver Present: No  Diagnosis: colocutaneous fistula  General Comment  Comments: RN ok'd patient for OT session. Pt pleasant, cooperative and agreeable. Pt reports 5/10 pain in the abdomen and declines intervention from therapist. Therapist provided patient education and demo for pursed lip  38.66  ADL Inpatient CMS 0-100% Score: 46.65  ADL Inpatient CMS G-Code Modifier : CK    Goals  Short Term Goals  Time Frame for Short Term Goals: By discharge, pt will:  Short Term Goal 1: demo UB ADLs Independently, including abdominal binder.  Short Term Goal 2: demo LB ADLs with SBA, adaptive techniques PRN.  Short Term Goal 3: demo functional trasnfers/mobility with SUP, LRD PRN for engagement in ADLs.  Short Term Goal 4: demo dynamic standing for 12+ mins with SUP,  LRD PRN for increased independence with ADLs.  Short Term Goal 5: demo use of 2 non-pharm pain relieving techniques during session independently with no vc's.     Therapy Time   Individual Concurrent Group Co-treatment   Time In 1328         Time Out 1410         Minutes 42         Timed Code Treatment Minutes: 38 Minutes     Daina Lambert, OTR/L

## 2024-03-14 NOTE — PROGRESS NOTES
Comprehensive Nutrition Assessment    Type and Reason for Visit:  RD Nutrition Re-Screen/LOS    Nutrition Recommendations/Plan:   Continue current diet, [facility]  Will send Diabetic ONS BID  Monitor/encourage PO intake     Malnutrition Assessment:  Malnutrition Status:  At risk for malnutrition (Comment) (03/14/24 1028)    Context:  Acute Illness     Findings of the 6 clinical characteristics of malnutrition:  Energy Intake:  50% or less of estimated energy requirements for 5 or more days  Weight Loss:  No significant weight loss     Body Fat Loss:  No significant body fat loss     Muscle Mass Loss:  No significant muscle mass loss    Fluid Accumulation:  Mild     Strength:  Not Performed    Nutrition Assessment:    Pt seen for LOS. 67 yo F adm for planned ex-lap for suspected colo or enterocutaneous fistula. PMH significant for COPD, DM, HLD, HTN. Pt s/p ex-lap with takedown transverse colocutaneous fistula (3/6) and s/p thrombectomy of RLE (3/12). Pt was NPO/CLD x 4 days this admission. Pt reports improving appetite, breakfast tray observed with 25-50% PO intake. Pt is agreeable to ONS, no flavor preference. LBM 3/17. +1 RLE edema noted. No wt loss noted per chart review. Per pt NKFA.    Nutrition Related Findings:    labs/meds reviewed Wound Type: Surgical Incision, Wound Vac       Current Nutrition Intake & Therapies:    Average Meal Intake: 26-50%  Average Supplements Intake: None Ordered  ADULT DIET; Regular; 4 carb choices (60 gm/meal)  ADULT ORAL NUTRITION SUPPLEMENT; Breakfast, Dinner; Diabetic Oral Supplement    Anthropometric Measures:  Height: 152.4 cm (5')  Ideal Body Weight (IBW): 100 lbs (45 kg)       Current Body Weight: 101.7 kg (224 lb 3.3 oz) (3/10/24), 224.2 % IBW. Weight Source: Other (Comment)  Current BMI (kg/m2): 43.8  Usual Body Weight: 97.5 kg (214 lb 15.2 oz) (5/8/23)  % Weight Change (Calculated): 4.3                    BMI Categories: Obese Class 3 (BMI 40.0 or  greater)    Estimated Daily Nutrient Needs:  Energy Requirements Based On: Formula  Weight Used for Energy Requirements: Current  Energy (kcal/day): 1900 to 2100 kcal/day  Weight Used for Protein Requirements: Ideal  Protein (g/day): 70 to 90 g/day  Method Used for Fluid Requirements: 1 ml/kcal  Fluid (ml/day): 1900 to 2100 ml/day    Nutrition Diagnosis:   Inadequate oral intake related to early satiety, pain as evidenced by intake 26-50%    Nutrition Interventions:   Food and/or Nutrient Delivery: Continue Current Diet, Start Oral Nutrition Supplement  Nutrition Education/Counseling: No recommendation at this time  Coordination of Nutrition Care: Continue to monitor while inpatient       Goals:  Previous Goal Met:  (goal set)  Goals: Meet at least 75% of estimated needs, prior to discharge       Nutrition Monitoring and Evaluation:   Behavioral-Environmental Outcomes: None Identified  Food/Nutrient Intake Outcomes: Food and Nutrient Intake, Supplement Intake  Physical Signs/Symptoms Outcomes: Biochemical Data, GI Status, Fluid Status or Edema, Nutrition Focused Physical Findings, Weight, Skin    Discharge Planning:    Too soon to determine     Hayley Littlejohn MS, RD, LD  Contact:   Floor Mobile: [phone]  Desk Phone: [phone]  RD Weekend Mobile: [phone]

## 2024-03-14 NOTE — PROGRESS NOTES
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 Diagnoses of Abdominal wall pain, Post-operative pain, and DVT (deep venous thrombosis) ([facility]) were also pertinent to this visit.  Past Medical History:  has a past medical history of Abdominal pain, Anxiety, Asthma, COPD (chronic obstructive pulmonary disease) ([facility]), COVID-19 vaccine series completed, Diabetes mellitus ([facility]), Full dentures, Goiter, Hyperlipidemia, Hyperparathyroidism ([facility]), Hypertension, Lives alone, Obesity, Oxygen dependent, Recurrent ventral hernia, Sleep apnea, Under care of team, Under care of team, Wears glasses, and Wellness examination.  Past Surgical History:  has a past surgical history that includes Hysterectomy ();  section (); ventral hernia repair; Tubal ligation (); hernia repair (N/A, 2024); Exploratory laparotomy w/ bowel resection (2024); laparotomy (N/A, 3/6/2024); and vascular surgery (Right, 3/12/2024).    Assessment   Body Structures, Functions, Activity Limitations Requiring Skilled Therapeutic Intervention: Decreased functional mobility ;Increased pain;Decreased strength;Decreased balance  Assessment: Pt ambulated 60ft seated rest break then 40ft with RW and CGA. Pt required CGA for sit<>stand transfers. Pt would benefit from PT following d/c to facilitate a return to independant life style.  Therapy Prognosis: Good  Activity Tolerance  Activity Tolerance: Patient limited by fatigue;Patient limited by pain     Plan   Physical Therapy Plan  General Plan:  (5-6x/wk)  Current Treatment Recommendations: Strengthening, Gait training, Stair training, Functional mobility training, Transfer training, Safety

## 2024-03-15 PROCEDURE — 94640 AIRWAY INHALATION TREATMENT: CPT

## 2024-03-15 PROCEDURE — 6370000000 HC RX 637 (ALT 250 FOR IP): Performed by: SURGERY

## 2024-03-15 PROCEDURE — 2700000000 HC OXYGEN THERAPY PER DAY

## 2024-03-15 PROCEDURE — 99024 POSTOP FOLLOW-UP VISIT: CPT | Performed by: SURGERY

## 2024-03-15 PROCEDURE — 6360000002 HC RX W HCPCS: Performed by: SURGERY

## 2024-03-15 PROCEDURE — 1200000000 HC SEMI PRIVATE

## 2024-03-15 PROCEDURE — 2580000003 HC RX 258: Performed by: SURGERY

## 2024-03-15 PROCEDURE — 94761 N-INVAS EAR/PLS OXIMETRY MLT: CPT

## 2024-03-15 PROCEDURE — 36415 COLL VENOUS BLD VENIPUNCTURE: CPT

## 2024-03-15 PROCEDURE — 99213 OFFICE O/P EST LOW 20 MIN: CPT

## 2024-03-15 PROCEDURE — 6370000000 HC RX 637 (ALT 250 FOR IP)

## 2024-03-15 PROCEDURE — 97110 THERAPEUTIC EXERCISES: CPT

## 2024-03-15 RX ORDER — GABAPENTIN 100 MG/1
100 CAPSULE ORAL 3 TIMES DAILY
Status: DISCONTINUED | OUTPATIENT
Start: 2024-03-15 | End: 2024-03-15

## 2024-03-15 RX ORDER — GABAPENTIN 100 MG/1
100 CAPSULE ORAL 3 TIMES DAILY
Status: DISCONTINUED | OUTPATIENT
Start: 2024-03-15 | End: 2024-03-16 | Stop reason: HOSPADM

## 2024-03-15 RX ORDER — GABAPENTIN 100 MG/1
100 CAPSULE ORAL 3 TIMES DAILY
Qty: 90 CAPSULE | Refills: 3 | Status: SHIPPED | OUTPATIENT
Start: 2024-03-15 | End: 2024-03-15

## 2024-03-15 RX ORDER — HYDROCODONE BITARTRATE AND ACETAMINOPHEN 5; 325 MG/1; MG/1
1 TABLET ORAL EVERY 6 HOURS PRN
Qty: 28 TABLET | Refills: 0 | Status: SHIPPED | OUTPATIENT
Start: 2024-03-15 | End: 2024-03-22

## 2024-03-15 RX ORDER — GABAPENTIN 300 MG/1
300 CAPSULE ORAL 3 TIMES DAILY
Status: DISCONTINUED | OUTPATIENT
Start: 2024-03-15 | End: 2024-03-15

## 2024-03-15 RX ORDER — GABAPENTIN 100 MG/1
100 CAPSULE ORAL 3 TIMES DAILY
Qty: 90 CAPSULE | Refills: 0 | Status: SHIPPED | OUTPATIENT
Start: 2024-03-15 | End: 2024-04-14

## 2024-03-15 RX ORDER — METHOCARBAMOL 750 MG/1
750 TABLET, FILM COATED ORAL 3 TIMES DAILY
Status: DISCONTINUED | OUTPATIENT
Start: 2024-03-15 | End: 2024-03-15

## 2024-03-15 RX ORDER — HYDROCODONE BITARTRATE AND ACETAMINOPHEN 5; 325 MG/1; MG/1
1 TABLET ORAL EVERY 6 HOURS PRN
Qty: 28 TABLET | Refills: 0 | Status: SHIPPED | OUTPATIENT
Start: 2024-03-15 | End: 2024-03-15

## 2024-03-15 RX ADMIN — APIXABAN 10 MG: 5 TABLET, FILM COATED ORAL at 20:37

## 2024-03-15 RX ADMIN — GABAPENTIN 100 MG: 100 CAPSULE ORAL at 08:47

## 2024-03-15 RX ADMIN — METRONIDAZOLE 500 MG: 500 TABLET ORAL at 22:34

## 2024-03-15 RX ADMIN — METRONIDAZOLE 500 MG: 500 TABLET ORAL at 06:26

## 2024-03-15 RX ADMIN — METRONIDAZOLE 500 MG: 500 TABLET ORAL at 13:14

## 2024-03-15 RX ADMIN — LOSARTAN POTASSIUM 25 MG: 50 TABLET, FILM COATED ORAL at 20:36

## 2024-03-15 RX ADMIN — HYDROCODONE BITARTRATE AND ACETAMINOPHEN 2 TABLET: 5; 325 TABLET ORAL at 11:53

## 2024-03-15 RX ADMIN — BUDESONIDE AND FORMOTEROL FUMARATE DIHYDRATE 2 PUFF: 160; 4.5 AEROSOL RESPIRATORY (INHALATION) at 08:34

## 2024-03-15 RX ADMIN — APIXABAN 10 MG: 5 TABLET, FILM COATED ORAL at 08:46

## 2024-03-15 RX ADMIN — MORPHINE SULFATE 4 MG: 4 INJECTION INTRAVENOUS at 20:39

## 2024-03-15 RX ADMIN — GABAPENTIN 100 MG: 100 CAPSULE ORAL at 13:14

## 2024-03-15 RX ADMIN — HYDROCODONE BITARTRATE AND ACETAMINOPHEN 2 TABLET: 5; 325 TABLET ORAL at 06:29

## 2024-03-15 RX ADMIN — SODIUM CHLORIDE, PRESERVATIVE FREE 10 ML: 5 INJECTION INTRAVENOUS at 08:47

## 2024-03-15 RX ADMIN — SODIUM CHLORIDE, PRESERVATIVE FREE 10 ML: 5 INJECTION INTRAVENOUS at 20:50

## 2024-03-15 RX ADMIN — GABAPENTIN 100 MG: 100 CAPSULE ORAL at 20:37

## 2024-03-15 RX ADMIN — CEPHALEXIN 500 MG: 500 CAPSULE ORAL at 06:26

## 2024-03-15 RX ADMIN — TIOTROPIUM BROMIDE INHALATION SPRAY 2 PUFF: 3.12 SPRAY, METERED RESPIRATORY (INHALATION) at 08:35

## 2024-03-15 RX ADMIN — CEPHALEXIN 500 MG: 500 CAPSULE ORAL at 17:48

## 2024-03-15 RX ADMIN — METHOCARBAMOL 750 MG: 750 TABLET ORAL at 08:47

## 2024-03-15 RX ADMIN — BUDESONIDE AND FORMOTEROL FUMARATE DIHYDRATE 2 PUFF: 160; 4.5 AEROSOL RESPIRATORY (INHALATION) at 20:38

## 2024-03-15 RX ADMIN — FUROSEMIDE 10 MG: 20 TABLET ORAL at 08:47

## 2024-03-15 RX ADMIN — METHOCARBAMOL 750 MG: 750 TABLET ORAL at 13:14

## 2024-03-15 RX ADMIN — CEPHALEXIN 500 MG: 500 CAPSULE ORAL at 11:54

## 2024-03-15 RX ADMIN — MORPHINE SULFATE 4 MG: 4 INJECTION INTRAVENOUS at 01:23

## 2024-03-15 ASSESSMENT — PAIN SCALES - GENERAL
PAINLEVEL_OUTOF10: 10
PAINLEVEL_OUTOF10: 6

## 2024-03-15 ASSESSMENT — PAIN DESCRIPTION - LOCATION: LOCATION: ABDOMEN

## 2024-03-15 NOTE — PLAN OF CARE
Problem: Discharge Planning  Goal: Discharge to home or other facility with appropriate resources  Outcome: Progressing  Flowsheets (Taken 3/15/2024 0800)  Discharge to home or other facility with appropriate resources: Identify barriers to discharge with patient and caregiver     Problem: Chronic Conditions and Co-morbidities  Goal: Patient's chronic conditions and co-morbidity symptoms are monitored and maintained or improved  Outcome: Progressing  Flowsheets (Taken 3/15/2024 0800)  Care Plan - Patient's Chronic Conditions and Co-Morbidity Symptoms are Monitored and Maintained or Improved: Monitor and assess patient's chronic conditions and comorbid symptoms for stability, deterioration, or improvement     Problem: Pain  Goal: Verbalizes/displays adequate comfort level or baseline comfort level  Outcome: Progressing     Problem: Safety - Adult  Goal: Free from fall injury  Outcome: Progressing     Problem: Respiratory - Adult  Goal: Achieves optimal ventilation and oxygenation  3/15/2024 1357 by Shreyas Damon RN  Outcome: Progressing  3/15/2024 0836 by Nieves Huerta RCP  Outcome: Progressing  Flowsheets (Taken 3/15/2024 0800 by Shreyas Damon RN)  Achieves optimal ventilation and oxygenation: Assess for changes in respiratory status     Problem: ABCDS Injury Assessment  Goal: Absence of physical injury  Outcome: Progressing     Problem: Skin/Tissue Integrity  Goal: Absence of new skin breakdown  Description: 1.  Monitor for areas of redness and/or skin breakdown  2.  Assess vascular access sites hourly  3.  Every 4-6 hours minimum:  Change oxygen saturation probe site  4.  Every 4-6 hours:  If on nasal continuous positive airway pressure, respiratory therapy assess nares and determine need for appliance change or resting period.  Outcome: Progressing     Problem: Nutrition Deficit:  Goal: Optimize nutritional status  Outcome: Progressing

## 2024-03-15 NOTE — PLAN OF CARE
Problem: Respiratory - Adult  Goal: Achieves optimal ventilation and oxygenation  3/14/2024 2125 by Vanessa Higginbotham RCP  Flowsheets (Taken 3/14/2024 2125)  Achieves optimal ventilation and oxygenation:   Assess and instruct to report shortness of breath or any respiratory difficulty   Encourage broncho-pulmonary hygiene including cough, deep breathe, incentive spirometry   Oxygen supplementation based on oxygen saturation or arterial blood gases   Assess for changes in mentation and behavior   Assess for changes in respiratory status   Respiratory therapy support as indicated  3/14/2024 1622 by Linda Ga, RN  Outcome: Progressing

## 2024-03-15 NOTE — PROGRESS NOTES
Physical Therapy  Facility/Department: [address] ONC/MED SURG  Physical Therapy     Name: Irais Max  : 1955  MRN: [id number]  Date of Service: 3/15/2024    Discharge Recommendations:  Patient would benefit from continued therapy after discharge       Patient Diagnosis(es): The primary encounter diagnosis was Colocutaneous fistula. Diagnoses of Abdominal wall pain, Post-operative pain, and DVT (deep venous thrombosis) ([facility]) were also pertinent to this visit.  Past Medical History:  has a past medical history of Abdominal pain, Anxiety, Asthma, COPD (chronic obstructive pulmonary disease) ([facility]), COVID-19 vaccine series completed, Diabetes mellitus ([facility]), Full dentures, Goiter, Hyperlipidemia, Hyperparathyroidism ([facility]), Hypertension, Lives alone, Obesity, Oxygen dependent, Recurrent ventral hernia, Sleep apnea, Under care of team, Under care of team, Wears glasses, and Wellness examination.  Past Surgical History:  has a past surgical history that includes Hysterectomy ();  section (); ventral hernia repair; Tubal ligation (); hernia repair (N/A, 2024); Exploratory laparotomy w/ bowel resection (2024); laparotomy (N/A, 3/6/2024); and vascular surgery (Right, 3/12/2024).    Assessment   Assessment: pt kindly defers to amb d/t 5/10 pain @ area of wound vac. pt dave ther ex in bed. pt could benefit from cont therapy to improve pt dynamic functional mobility as pt is not functioning @ her baseline before recent hospital admit.  Requires PT Follow-Up: Yes  Activity Tolerance  Activity Tolerance: Patient limited by fatigue;Patient limited by pain  Activity Tolerance Comments: pt dave ther ex in bed     Plan   Physical Therapy Plan  General Plan:  (5-6x/wk)  Current Treatment Recommendations: Strengthening, Gait training, Stair training, Functional mobility training, Transfer training, Safety education & training, Patient/Caregiver education & training, Pain management, Home exercise

## 2024-03-15 NOTE — CARE COORDINATION
Patient is approved through [employer] to go to the [facility] and it is good to 3/19.  A copy of the approval is sent to Halley at [facility] [phone].  Auth number is [id number].    Halley is notified and the patient can come after 1900.  Writer arranged transport through [place] at [facility] and will pick the patient up at 2000.  Writer left message with Halley notifying her of the  time and writer requested a confirmation call.  HENS and AVS are complete.      1600 Halley from [place] at [facility] called and will not have a replacement wound vac until tomorrow and asks that the patient come tomorrow.  Transport is canceled tonight and arranged for 1200 3/16/24.  Will need the order for Norco addressed prior to leaving for SNF.  Dr. Purdy is notified and is aware.

## 2024-03-15 NOTE — PROGRESS NOTES
abdominal wall mesh x2  Muscle fascia debridement ~20 sq cm  Placement of wound vac >50sqcm    3/12/2024  Perc mechanical thrombectomy RLE    PLAN    Carb control diet  Pain control prn, gabapentin added, DC robaxin. Abdominal wall pain from exertion, continue to work with PT/OT  Insurance approval for SNF, plan DC in the next 24-38hrs  Plan outpt follow up in [facility] clinic in two weeks to monitor wound progress  DC instructions provided  Continue Eliquis, plan 3 month therapy, plan outpt follow up with Vascular Surgery service for evaluation  Supportive care    Electronically signed by Mayito Jose DO on 3/15/2024 at 7:55 AM      Electronically signed by Christo Purdy DO on 3/15/2024 at 2:10 PM

## 2024-03-15 NOTE — PLAN OF CARE
Problem: Discharge Planning  Goal: Discharge to home or other facility with appropriate resources  3/15/2024 1547 by Shreyas Damon RN  Outcome: Completed  3/15/2024 1357 by Shreyas Damon RN  Outcome: Progressing  Flowsheets (Taken 3/15/2024 0800)  Discharge to home or other facility with appropriate resources: Identify barriers to discharge with patient and caregiver     Problem: Chronic Conditions and Co-morbidities  Goal: Patient's chronic conditions and co-morbidity symptoms are monitored and maintained or improved  3/15/2024 1547 by Shreyas Damon RN  Outcome: Completed  3/15/2024 1357 by Shreyas Damon RN  Outcome: Progressing  Flowsheets (Taken 3/15/2024 0800)  Care Plan - Patient's Chronic Conditions and Co-Morbidity Symptoms are Monitored and Maintained or Improved: Monitor and assess patient's chronic conditions and comorbid symptoms for stability, deterioration, or improvement     Problem: Pain  Goal: Verbalizes/displays adequate comfort level or baseline comfort level  3/15/2024 1547 by Shreyas Damon RN  Outcome: Completed  3/15/2024 1357 by Shreyas Damon RN  Outcome: Progressing     Problem: Safety - Adult  Goal: Free from fall injury  3/15/2024 1547 by Shreyas Damon RN  Outcome: Completed  3/15/2024 1357 by Shreyas Damon RN  Outcome: Progressing     Problem: Respiratory - Adult  Goal: Achieves optimal ventilation and oxygenation  3/15/2024 1547 by Shreyas Damon RN  Outcome: Completed  3/15/2024 1357 by Shreyas Damon RN  Outcome: Progressing  3/15/2024 0836 by Nieves Huerta RCP  Outcome: Progressing  Flowsheets (Taken 3/15/2024 0800 by Shreyas Damon RN)  Achieves optimal ventilation and oxygenation: Assess for changes in respiratory status     Problem: ABCDS Injury Assessment  Goal: Absence of physical injury  3/15/2024 1547 by Shreyas Damon RN  Outcome: Completed  3/15/2024 1357 by Shreyas Damon RN  Outcome: Progressing     Problem:

## 2024-03-15 NOTE — PLAN OF CARE
Problem: Respiratory - Adult  Goal: Achieves optimal ventilation and oxygenation  3/15/2024 0836 by Nieves Huerta, MAISHA  Outcome: Progressing   BRONCHOSPASM/BRONCHOCONSTRICTION     [x]         IMPROVE AERATION/BREATH SOUNDS  [x]   ADMINISTER BRONCHODILATOR THERAPY AS APPROPRIATE    [x] ASSESS BREATH SOUNDS  []   IMPLEMENT AEROSOL/MDI PROTOCOL  [x]   PATIENT EDUCATION AS NEEDED

## 2024-03-15 NOTE — PROGRESS NOTES
[facility] Contin[facility] Nurse  Follow Up      NAME:  Irais Max  MEDICAL RECORD NUMBER:  [id number]  AGE: 68 y.o.   GENDER: female  : 1955  TODAY'S DATE:  3/15/2024    Subjective:   Reason for WOC Nurse Evaluation and Assessment:   NPWT dressing changes to abdominal incision.     Wound History:   3/6/2024 : Exploratory laparotomy, Takedown transverse colocutaneous fistula, Takedown of small bowel adhesions to abdominal wall mesh  Explant of abdominal wall mesh x2, Muscle fascia debridement ~20 sq cm, placement of wound vac >50sqcm with  for abdominal wall abscess secondary to transverse colocutaneous fistula, fascial dehiscence, early signs of fistulization of small bowel to mesh x2  Current Wound Care Treatment:  NPWT at -125 mmHg    Discharging to SNF at 1800 today per charge RN.         Objective:      BP (!) 126/59   Pulse 82   Temp 97.9 °F (36.6 °C) (Oral)   Resp 16   Ht 1.524 m (5')   Wt 101.7 kg (224 lb 3.3 oz)   SpO2 95%   BMI 43.79 kg/m²   Chris Risk Score: Chris Scale Score: 20    LABS    CBC:   Lab Results   Component Value Date/Time    WBC 11.6 2024 08:28 AM    RBC 3.81 2024 08:28 AM    HGB 10.7 2024 08:28 AM    HCT 36.7 2024 08:28 AM     CMP:  Albumin:    Lab Results   Component Value Date/Time    LABALBU 4.5 2024 08:11 AM     PT/INR:    Lab Results   Component Value Date/Time    PROTIME 15.3 2024 02:50 PM    INR 1.2 2024 02:50 PM     HgBA1c:    Lab Results   Component Value Date/Time    LABA1C 6.6 2024 08:11 AM     PTT: No components found for: \"LABPTT\"      Assessment:       Measurements:       03/15/24 1635   Negative Pressure Wound Therapy Abdomen Lower;Medial   No placement date or time found.   Location: Abdomen  Wound Location Orientation: Lower;Medial   Cycle Off   Incision 24 Abdomen Lower;Medial   Date First Assessed/Time First Assessed: 24 1409   Present on Original Admission: No  Location: Abdomen   Incision Location Orientation: Lower;Medial   Wound Image    Dressing Status New dressing applied   Dressing Change Due 03/16/24   Incision Cleansed Cleansed with saline   Dressing/Treatment Moisten with saline;Moist to moist;ABD pad   Incision Assessment Other (Comment)  (subcutaneous, granular, fibrinous)   Drainage Amount Small (< 25%)   Drainage Description Serosanguinous   Odor None   Mireille-incision Assessment Intact       NPWT dressing removed at 1545, NS moistened gauze dressing placed for transport to SNF.     Response to treatment:  Well tolerated by patient.         Plan:   Plan of Care:    ABDOMINAL INCISION:  NPWT using white foam under black granufoam with vacuum -125mm Hg.  Change dressing 3 times/week. Change canister weekly and if full.    If suction fails or at discharge, remove granufoam and place saline-moistened gauze; change every 12 hours until NPWT is resumed.       Update: patient now apparently discharging 3/16/2024 at 1200 per note of . Confirmed this plan with  via the telephone. .   Next dressing change due on morning of 3/16/2024. Re-apply saline moistened gauze packing and ABD pad.        Electronically signed by Lauren Narayanan RN, CWON on 3/15/2024 at 4:37 PM

## 2024-03-16 VITALS
HEIGHT: 60 IN | DIASTOLIC BLOOD PRESSURE: 53 MMHG | TEMPERATURE: 98.1 F | WEIGHT: 224.21 LBS | OXYGEN SATURATION: 92 % | RESPIRATION RATE: 18 BRPM | BODY MASS INDEX: 44.02 KG/M2 | HEART RATE: 101 BPM | SYSTOLIC BLOOD PRESSURE: 118 MMHG

## 2024-03-16 PROCEDURE — 94761 N-INVAS EAR/PLS OXIMETRY MLT: CPT

## 2024-03-16 PROCEDURE — 6370000000 HC RX 637 (ALT 250 FOR IP): Performed by: SURGERY

## 2024-03-16 PROCEDURE — 6360000002 HC RX W HCPCS: Performed by: SURGERY

## 2024-03-16 PROCEDURE — 2700000000 HC OXYGEN THERAPY PER DAY

## 2024-03-16 PROCEDURE — 94640 AIRWAY INHALATION TREATMENT: CPT

## 2024-03-16 PROCEDURE — 6370000000 HC RX 637 (ALT 250 FOR IP)

## 2024-03-16 PROCEDURE — 2580000003 HC RX 258: Performed by: SURGERY

## 2024-03-16 RX ADMIN — BUDESONIDE AND FORMOTEROL FUMARATE DIHYDRATE 2 PUFF: 160; 4.5 AEROSOL RESPIRATORY (INHALATION) at 08:48

## 2024-03-16 RX ADMIN — GABAPENTIN 100 MG: 100 CAPSULE ORAL at 08:25

## 2024-03-16 RX ADMIN — MORPHINE SULFATE 4 MG: 4 INJECTION INTRAVENOUS at 11:14

## 2024-03-16 RX ADMIN — SODIUM CHLORIDE, PRESERVATIVE FREE 10 ML: 5 INJECTION INTRAVENOUS at 08:25

## 2024-03-16 RX ADMIN — CEPHALEXIN 500 MG: 500 CAPSULE ORAL at 00:58

## 2024-03-16 RX ADMIN — METRONIDAZOLE 500 MG: 500 TABLET ORAL at 07:00

## 2024-03-16 RX ADMIN — APIXABAN 10 MG: 5 TABLET, FILM COATED ORAL at 08:25

## 2024-03-16 RX ADMIN — FUROSEMIDE 10 MG: 20 TABLET ORAL at 08:24

## 2024-03-16 RX ADMIN — CYCLOBENZAPRINE HYDROCHLORIDE 5 MG: 5 TABLET, FILM COATED ORAL at 01:02

## 2024-03-16 RX ADMIN — TIOTROPIUM BROMIDE INHALATION SPRAY 2 PUFF: 3.12 SPRAY, METERED RESPIRATORY (INHALATION) at 08:48

## 2024-03-16 ASSESSMENT — PAIN DESCRIPTION - LOCATION: LOCATION: INCISION

## 2024-03-16 ASSESSMENT — PAIN SCALES - GENERAL: PAINLEVEL_OUTOF10: 6

## 2024-03-16 NOTE — PROGRESS NOTES
[facility] Surgery Progress Note            PATIENT NAME: Irais Max     TODAY'S DATE: 3/16/2024, 5:58 AM    CC: Colocutaneous fistula    SUBJECTIVE:    Pt seen and examined at bedside.  No acute events overnight.  VSS, afebrile.  Patient with some abdominal soreness, however overall pain is well-controlled.  She is tolerating diet without nausea or vomiting.  Continues to pass flatus and had a bowel movement this morning.    OBJECTIVE:   Vitals:  BP (!) 124/51   Pulse 73   Temp 98.1 °F (36.7 °C) (Temporal)   Resp 16   Ht 1.524 m (5')   Wt 101.7 kg (224 lb 3.3 oz)   SpO2 95%   BMI 43.79 kg/m²    TEMPERATURE:  Current - Temp: 98.1 °F (36.7 °C); Max - Temp  Av °F (36.7 °C)  Min: 97.7 °F (36.5 °C)  Max: 98.2 °F (36.8 °C)    INTAKE/OUTPUT:    No intake or output data in the 24 hours ending 24 0558              General: Awake, alert, no apparent distress  Lungs: No acute respiratory distress, or accessory muscle use  Heart: Regular rate and rhythm  Abdomen: Soft, nondistended, nontender to palpation.  Dressing intact to midline without significant drainage.  VAC to be placed at facility today.  Extremity: moves all extremities x4      Data Review:  CBC:   No results for input(s): \"WBC\", \"HGB\", \"PLT\" in the last 72 hours.    BMP:    No results for input(s): \"NA\", \"K\", \"CL\", \"CO2\", \"BUN\", \"CREATININE\", \"GLUCOSE\" in the last 72 hours.    Hepatic: No results for input(s): \"AST\", \"ALT\", \"ALB\", \"ALKPHOS\", \"BILITOT\", \"BILIDIR\" in the last 72 hours.  Coagulation:   No results for input(s): \"APTT\", \"PROT\", \"INR\" in the last 72 hours.            ASSESSMENT     Active Hospital Problems    Diagnosis Date Noted    Acute deep vein thrombosis (DVT) of iliac vein of right lower extremity (HCC) [I82.421] 2024    Colocutaneous fistula [K63.2] 2024        3/6/2024  Exploratory laparotomy  Takedown transverse colocutaneous fistula  Takedown of small bowel adhesions to abdominal wall mesh  Explant of  abdominal wall mesh x2  Muscle fascia debridement ~20 sq cm  Placement of wound vac >50sqcm    3/12/2024  Perc mechanical thrombectomy RLE    PLAN    Carb control diet  PT/OT   Plan outpt follow up in [facility] clinic in two weeks to monitor wound progress. Vac therapy to continue at SNF.   DC instructions provided  Continue Eliquis, plan 3 month therapy, plan outpt follow up with Vascular Surgery service for evaluation  Anticipate discharge to SNF today. Transport arranged per .           Electronically signed by Georgette Andrade DO on 3/16/2024 at 5:58 AM

## 2024-03-16 NOTE — CARE COORDINATION
Report given to transporter. Patient ambulated to transport stretcher. All belongings sent with patient and transporters. Patient left with transporters, destination [facility] at [place].

## 2024-03-16 NOTE — CARE COORDINATION
Transitional planning    [facility] spoke with [facility] confirmed time for 12. Call to [facility], spoke with Alana, ok to come today at 12, they have the vac, primary RN updated and provided number for report and instructed to break down vac to WTD just prior to leaving.Faxed AVS, HENS and scripts, going to room 21.

## 2024-03-19 ENCOUNTER — HOSPITAL ENCOUNTER (OUTPATIENT)
Age: 69
Setting detail: SPECIMEN
Discharge: HOME | End: 2024-03-19

## 2024-03-19 LAB
ALBUMIN SERPL-MCNC: 3.2 G/DL (ref 3.5–5.2)
ALP SERPL-CCNC: 77 U/L (ref 35–104)
ALT SERPL-CCNC: 12 U/L (ref 5–33)
ANION GAP SERPL CALCULATED.3IONS-SCNC: 12 MMOL/L (ref 9–17)
AST SERPL-CCNC: 34 U/L
BILIRUB SERPL-MCNC: 0.5 MG/DL (ref 0.3–1.2)
BUN SERPL-MCNC: 7 MG/DL (ref 8–23)
BUN/CREAT SERPL: 14 (ref 9–20)
CALCIUM SERPL-MCNC: 9.7 MG/DL (ref 8.6–10.4)
CHLORIDE SERPL-SCNC: 99 MMOL/L (ref 98–107)
CO2 SERPL-SCNC: 26 MMOL/L (ref 20–31)
CREAT SERPL-MCNC: 0.5 MG/DL (ref 0.5–0.9)
ERYTHROCYTE [DISTWIDTH] IN BLOOD BY AUTOMATED COUNT: 15.8 % (ref 11.8–14.4)
GFR SERPL CREATININE-BSD FRML MDRD: >60 ML/MIN/1.73M2
GLUCOSE SERPL-MCNC: 103 MG/DL (ref 70–99)
HCT VFR BLD AUTO: 39.8 % (ref 36.3–47.1)
HGB BLD-MCNC: 11.9 G/DL (ref 11.9–15.1)
MCH RBC QN AUTO: 28.4 PG (ref 25.2–33.5)
MCHC RBC AUTO-ENTMCNC: 29.9 G/DL (ref 28.4–34.8)
MCV RBC AUTO: 95 FL (ref 82.6–102.9)
NRBC BLD-RTO: 0 PER 100 WBC
PLATELET # BLD AUTO: 452 K/UL (ref 138–453)
PMV BLD AUTO: 11.1 FL (ref 8.1–13.5)
POTASSIUM SERPL-SCNC: 4.2 MMOL/L (ref 3.7–5.3)
PROT SERPL-MCNC: 6.9 G/DL (ref 6.4–8.3)
RBC # BLD AUTO: 4.19 M/UL (ref 3.95–5.11)
SODIUM SERPL-SCNC: 137 MMOL/L (ref 135–144)
TSH SERPL DL<=0.05 MIU/L-ACNC: 1.77 UIU/ML (ref 0.3–5)
WBC OTHER # BLD: 11.2 K/UL (ref 3.5–11.3)

## 2024-03-19 PROCEDURE — 80053 COMPREHEN METABOLIC PANEL: CPT

## 2024-03-19 PROCEDURE — 85027 COMPLETE CBC AUTOMATED: CPT

## 2024-03-19 PROCEDURE — P9603 ONE-WAY ALLOW PRORATED MILES: HCPCS

## 2024-03-19 PROCEDURE — 36415 COLL VENOUS BLD VENIPUNCTURE: CPT

## 2024-03-19 PROCEDURE — 84443 ASSAY THYROID STIM HORMONE: CPT

## 2024-03-19 NOTE — DISCHARGE SUMMARY
Surgery Discharge Summary     Patient Identification  Irais Max is a 68 y.o. female.  :  1955  Admit Date:  3/6/2024    Discharge date:   3/16/2024 12:19 PM                                   Disposition: home    Discharge Diagnoses:   Patient Active Problem List   Diagnosis    Incarcerated incisional hernia    Recurrent umbilical hernia    Colocutaneous fistula    Acute deep vein thrombosis (DVT) of iliac vein of right lower extremity (HCC)       Condition on discharge: stable    Consults: Vascular Surgery service    Surgery:   Exploratory laparotomy  Excision of old mesh x2  Segmental resection of transverse colon involving colocutaneous fistula with stapled anastomosis  Repair of small bowel serosal injury x2  Primary closure of abdominal wall  Abdominal wall wound vac placement\  Percutaneous thrombectomy of the RLE    Patient Instructions:   Activity: no heavy lifting, pushing, pulling for 6 weeks, no driving for 2 weeks or while on analgesics  Diet: As tolerated  Follow-up with Dr Purdy in 10-14 days.    See pre-printed instructions in chart and given to patient upon discharge.    Discharge Medications:        Medication List        START taking these medications      * apixaban 5 MG Tabs tablet  Commonly known as: ELIQUIS  Take 2 tablets by mouth 2 times daily for 9 doses     * apixaban 5 MG Tabs tablet  Commonly known as: ELIQUIS  Take 1 tablet by mouth 2 times daily  Start taking on: 2024     cephALEXin 500 MG capsule  Commonly known as: Keflex  Take 1 capsule by mouth 2 times daily for 10 days     gabapentin 100 MG capsule  Commonly known as: NEURONTIN  Take 1 capsule by mouth 3 times daily for 30 days.     HYDROcodone-acetaminophen 5-325 MG per tablet  Commonly known as: NORCO  Take 1 tablet by mouth every 6 hours as needed for Pain for up to 7 days. Max Daily Amount: 4 tablets     metroNIDAZOLE 500 MG tablet  Commonly known as: FLAGYL  Take 1 tablet by mouth 3 times daily for 10 days

## 2024-03-25 ENCOUNTER — OFFICE VISIT (OUTPATIENT)
Dept: SURGERY | Age: 69
End: 2024-03-25

## 2024-03-25 VITALS
RESPIRATION RATE: 16 BRPM | HEIGHT: 60 IN | WEIGHT: 224 LBS | BODY MASS INDEX: 43.98 KG/M2 | SYSTOLIC BLOOD PRESSURE: 137 MMHG | OXYGEN SATURATION: 100 % | DIASTOLIC BLOOD PRESSURE: 77 MMHG | HEART RATE: 94 BPM

## 2024-03-25 DIAGNOSIS — Z98.890 STATUS POST EXPLORATORY LAPAROTOMY: Primary | ICD-10-CM

## 2024-03-25 PROCEDURE — 99024 POSTOP FOLLOW-UP VISIT: CPT | Performed by: SURGERY

## 2024-03-25 NOTE — PROGRESS NOTES
[facility] General Surgery   Clinic Evaluation  Christo Purdy DO    PATIENT NAME: Irais Max     CLINIC VISIT DATE: 3/25/2024    SUBJECTIVE:  Irais Max is a 68 y.o. female who presents to the clinic today for follow up to surgery and hospital admission for colocutaneous fistula, pt underwent the following procedure:    Exploratory laparotomy  Takedown transverse colocutaneous fistula  Takedown of small bowel adhesions to abdominal wall mesh  Explant of abdominal wall mesh x2  Muscle fascia debridement ~20 sq cm  Placement of wound vac >50sqcm    Patient was discharged to SNF, she is making slow but steady progress. Continues to have wound vac changes. No new complaints, she is tolerating regular diet and having normal BM.      OBJECTIVE:    /77 (Site: Right Upper Arm, Position: Sitting, Cuff Size: Medium Adult)   Pulse 94   Resp 16   Ht 1.524 m (5')   Wt 101.6 kg (224 lb)   SpO2 100%   BMI 43.75 kg/m²     General Appearance:  awake, alert, no acute distress, well developed, well nourished   Skin:  Skin color, texture, turgor normal. No rashes or lesions.  Lungs:  Normal chest expansion, unlabored breathing without accessory muscle use.   No audible rales, rhonchi, or wheezing.  Cardiovascular: S1S2. No evidence of JVD. No evidence of pulsatile masses in abdomen  Abdomen:  wound vac was removed at [facility] and pt presents with saline soaked packing. The wound base and edges are healthy, the undermining around the periphery appears to continue to seal but this was not explored aggressively. No evidence of bleeding or infection. PDS sutures are visible as expected.  Musculoskeletal: No evidence of bony/muscular deformities, trauma, atrophy of either left/right upper/lower extremity. No evidence of digital clubbing or cyanosis.      ASSESSMENT:  1. Status post exploratory laparotomy        PLAN:  We discussed the treatment plan moving forward. Continue NPWT for now, I explained to the patient that this

## 2024-03-26 ENCOUNTER — TELEPHONE (OUTPATIENT)
Dept: SURGERY | Age: 69
End: 2024-03-26

## 2024-03-26 NOTE — TELEPHONE ENCOUNTER
Nat with the [facility] is asking if Dr Purdy will follow Irais for for her wound vac. DOS 3/6/24.    She is asking for a return call to [phone] to discuss.    Thank you.

## 2024-03-27 NOTE — TELEPHONE ENCOUNTER
Returned call to Nat at [facility] to call our office. Per Dr. Purdy, the patient is scheduled 4 weeks out for their procedure. Until then, the patient will need to be seen through Wound Care. We will need to know if they want to go to [facility] or [facility] to send a referral.

## 2024-04-10 ENCOUNTER — HOSPITAL ENCOUNTER (OUTPATIENT)
Age: 69
Setting detail: SPECIMEN
Discharge: HOME | End: 2024-04-10
Payer: MEDICARE

## 2024-04-10 LAB
BILIRUB UR QL STRIP: NEGATIVE
CLARITY UR: CLEAR
COLOR UR: YELLOW
GLUCOSE UR STRIP-MCNC: ABNORMAL MG/DL
HGB UR QL STRIP.AUTO: NEGATIVE
KETONES UR STRIP-MCNC: NEGATIVE MG/DL
LEUKOCYTE ESTERASE UR QL STRIP: ABNORMAL
NITRITE UR QL STRIP: NEGATIVE
PH UR STRIP: 6 [PH] (ref 5–8)
PROT UR STRIP-MCNC: NEGATIVE MG/DL
SP GR UR STRIP: 1.02 (ref 1–1.03)
UROBILINOGEN UR STRIP-ACNC: NORMAL EU/DL (ref 0–1)

## 2024-04-10 PROCEDURE — 87086 URINE CULTURE/COLONY COUNT: CPT

## 2024-04-10 PROCEDURE — 87186 SC STD MICRODIL/AGAR DIL: CPT

## 2024-04-10 PROCEDURE — 87077 CULTURE AEROBIC IDENTIFY: CPT

## 2024-04-10 PROCEDURE — 81001 URINALYSIS AUTO W/SCOPE: CPT

## 2024-04-11 LAB
BACTERIA URNS QL MICRO: NORMAL
CASTS #/AREA URNS LPF: NORMAL /LPF (ref 0–8)
EPI CELLS #/AREA URNS HPF: NORMAL /HPF (ref 0–5)
RBC #/AREA URNS HPF: NORMAL /HPF (ref 0–4)
WBC #/AREA URNS HPF: NORMAL /HPF (ref 0–5)

## 2024-04-12 ENCOUNTER — TELEPHONE (OUTPATIENT)
Age: 69
End: 2024-04-12

## 2024-04-12 LAB
MICROORGANISM SPEC CULT: ABNORMAL
SPECIMEN DESCRIPTION: ABNORMAL

## 2024-04-12 NOTE — TELEPHONE ENCOUNTER
Received call from KALEIGH Goodman w/[facility]. States patient has post op visit on 4/29/24.  She is calling to see if orders should be placed for home nursing to do wet to dry removal morning of visit in prep for wound vac removal at visit.  She is also requesting an additional order to replace dressing later that same.  There is concern that insurance will not cover 2 home visits in one day.  Writer advised to keep scheduled visit and any orders can be placed after she is seen in office.  Will notify Dr Purdy and he will discuss patient care at post op going forward.    Rex FARRIS [phone]  [facility] fax for orders [phone]

## 2024-04-22 ENCOUNTER — TELEPHONE (OUTPATIENT)
Dept: SURGERY | Age: 69
End: 2024-04-22

## 2024-04-22 NOTE — TELEPHONE ENCOUNTER
Pt called requesting to speak to Varsha. Writer unable to reach clinical staff. Please contact pt.

## 2024-04-24 NOTE — TELEPHONE ENCOUNTER
Patients original post op was Monday 4/26/24, patient called on 4/22/24 requesting to reschedule appt d/t her home nurse only being available on Friday's to replace patients wound vac after the post op. Writer explained to patient the first available would be 4/26/24 at 3:45pm. Patient checked with her home nurse and called writer back and confirmed 4/26/24 appt. Writer received message from Dr. Purdy on 4/24/24 requesting appt be rescheduled to either Thursday 4/25/24 or Monday 4/26/24. Writer called patient explaining we needed to reschedule her post op appt with Dr. Purdy and apologized that the office didn't realize Dr. Purdy wouldn't be available and offered both dates to patient. Patient because upset, stating \"this is a last minute change\" and \"she had to arrange transportation with everyone\" and \"it's ridiculous she's had to reschedule so many times\". Writer apologized multiple times and offered the two options for her post op. Patient continued on stating that it was too much for her as a patient to contact everyone to rearrange their schedule and stated she had to call back because she didn't know what to say. Call ended and writer will cancel patient's 4/26/24 appt and will follow up with patient on Thursday if patient hasn't called office.

## 2024-05-03 ENCOUNTER — OFFICE VISIT (OUTPATIENT)
Dept: SURGERY | Age: 69
End: 2024-05-03

## 2024-05-03 VITALS
DIASTOLIC BLOOD PRESSURE: 78 MMHG | BODY MASS INDEX: 43.98 KG/M2 | HEIGHT: 60 IN | HEART RATE: 78 BPM | SYSTOLIC BLOOD PRESSURE: 132 MMHG | WEIGHT: 224 LBS

## 2024-05-03 DIAGNOSIS — Z87.19 STATUS POST REPAIR OF VENTRAL HERNIA: ICD-10-CM

## 2024-05-03 DIAGNOSIS — R10.9 ABDOMINAL WALL PAIN: Primary | ICD-10-CM

## 2024-05-03 DIAGNOSIS — Z98.890 STATUS POST REPAIR OF VENTRAL HERNIA: ICD-10-CM

## 2024-05-03 PROCEDURE — 99024 POSTOP FOLLOW-UP VISIT: CPT | Performed by: SURGERY

## 2024-05-06 ENCOUNTER — TELEPHONE (OUTPATIENT)
Dept: SURGERY | Age: 69
End: 2024-05-06

## 2024-05-06 NOTE — PROGRESS NOTES
month to monitor progress. All questions were answered, pt is agreeable to this plan.      Electronically signed by Christo Purdy DO on 5/6/2024 at 7:53 AM

## 2024-05-06 NOTE — TELEPHONE ENCOUNTER
Call patient to check on status . Patient report wet to dry dressing is working fine, patient stated she feel confident to apply dressing.

## 2024-05-10 NOTE — DISCHARGE INSTRUCTIONS
[facility] -Phone: [phone] Fax: [phone]   Visit  Discharge Instructions / Physician Orders    DATE: 5/16/2024     Home Care: [facility]      SUPPLIES ORDERED THRU: Home Care     Wound Location: Mid Abdomen     Cleanse with: Liquid antibacterial soap and water, rinse well      Dressing Orders: Silver Alginate to wound (Silvercel), Silicone Border Bandage (Excel SAP)     Frequency: Every Other Day     Additional Orders: Increase protein to diet (meat, cheese, eggs, fish, peanut butter, nuts and beans)    Your next appointment with Wound Care Center is in 2 weeks     (Please note your next appointment above and if you are unable to keep, kindly give a 24 hour notice. Thank you.)  If more than 15 min late we cannot guarantee you will be seen due to clinician schedule  Per Policy, Excessive cancellation will call for dismissal from program.     If you experience any of the following, please call the Wound Care Center during business hours:  [phone]     * Increase in Pain  * Temperature over 101  * Increase in drainage from your wound  * Drainage with a foul odor  * Bleeding  * Increase in swelling  * Need for compression bandage changes due to slippage, breakthrough drainage.     If you need medical attention outside of the business hours of the Wound Care Centers please contact your PCP or go to the an urgent care or emergency department     The information contained in the After Visit Summary has been reviewed with me, the patient and/or responsible adult, by my health care provider(s). I had the opportunity to ask questions regarding this information. I have elected to receive;      []After Visit Summary  [x]Comprehensive Discharge Instruction      Patient signature______________________________________Date:________  Electronically signed by Shreyas Greene RN on 5/16/2024 at 9:40 AM  Electronically signed by GODWIN Hanna CNP on 5/16/2024 at 9:19 AM

## 2024-05-14 ENCOUNTER — APPOINTMENT (OUTPATIENT)
Dept: GENERAL RADIOLOGY | Age: 69
End: 2024-05-14
Payer: MEDICARE

## 2024-05-14 ENCOUNTER — HOSPITAL ENCOUNTER (EMERGENCY)
Age: 69
Discharge: HOME | End: 2024-05-14
Attending: EMERGENCY MEDICINE | Admitting: STUDENT IN AN ORGANIZED HEALTH CARE EDUCATION/TRAINING PROGRAM
Payer: MEDICARE

## 2024-05-14 VITALS
RESPIRATION RATE: 18 BRPM | BODY MASS INDEX: 39.39 KG/M2 | WEIGHT: 200.62 LBS | OXYGEN SATURATION: 94 % | DIASTOLIC BLOOD PRESSURE: 61 MMHG | TEMPERATURE: 99 F | SYSTOLIC BLOOD PRESSURE: 141 MMHG | HEART RATE: 80 BPM | HEIGHT: 60 IN
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
## 2024-05-14 DIAGNOSIS — S20.211A CHEST WALL CONTUSION, RIGHT, INITIAL ENCOUNTER: Primary | ICD-10-CM

## 2024-05-14 PROCEDURE — 71101 X-RAY EXAM UNILAT RIBS/CHEST: CPT

## 2024-05-14 PROCEDURE — 6370000000 HC RX 637 (ALT 250 FOR IP): Performed by: NURSE PRACTITIONER

## 2024-05-14 PROCEDURE — 99283 EMERGENCY DEPT VISIT LOW MDM: CPT

## 2024-05-14 RX ORDER — LIDOCAINE 4 G/G
1 PATCH TOPICAL DAILY
Status: DISCONTINUED | OUTPATIENT
Start: 2024-05-14 | End: 2024-05-14 | Stop reason: HOSPADM

## 2024-05-14 ASSESSMENT — ENCOUNTER SYMPTOMS
ABDOMINAL PAIN: 0
DIARRHEA: 0
BACK PAIN: 0
SORE THROAT: 0
SHORTNESS OF BREATH: 0
VOMITING: 0
COUGH: 0
NAUSEA: 0

## 2024-05-14 ASSESSMENT — PAIN DESCRIPTION - PAIN TYPE: TYPE: ACUTE PAIN

## 2024-05-14 ASSESSMENT — PAIN SCALES - GENERAL: PAINLEVEL_OUTOF10: 8

## 2024-05-14 ASSESSMENT — PAIN DESCRIPTION - ORIENTATION: ORIENTATION: RIGHT

## 2024-05-14 ASSESSMENT — PAIN DESCRIPTION - LOCATION: LOCATION: RIB CAGE

## 2024-05-14 ASSESSMENT — PAIN - FUNCTIONAL ASSESSMENT: PAIN_FUNCTIONAL_ASSESSMENT: 0-10

## 2024-05-14 ASSESSMENT — PAIN DESCRIPTION - DESCRIPTORS: DESCRIPTORS: SHARP

## 2024-05-14 NOTE — ED PROVIDER NOTES
[facility] Emergency Department  [address]  [place] [place] [facility] [address]  Phone: [phone]  Fax: [phone]      Attending Physician Attestation          CHIEF COMPLAINT       Chief Complaint   Patient presents with    Rib Injury     Stumble and fall 1 day ago. (Tripped over pet-cat-furniture)  Landing awkwardly onto right ribs.  Pt feels \"clicking\" in right rib area.  Pain with moving/breathing.       DIAGNOSTIC RESULTS     LABS:  Labs Reviewed - No data to display    All other labs were within normal range or not returned as of this dictation.    RADIOLOGY:  XR RIBS RIGHT INCLUDE CHEST (MIN 3 VIEWS)   Final Result   No acute abnormality of the right ribs.      No acute process in the lungs.               EMERGENCY DEPARTMENT COURSE:   Vitals:    Vitals:    05/14/24 1348   BP: (!) 141/61   Pulse: 80   Resp: 18   Temp: 99 °F (37.2 °C)   TempSrc: Oral   SpO2: 94%   Weight: 91 kg (200 lb 9.9 oz)   Height: 1.52 m (4' 11.84\")     -------------------------  BP: (!) 141/61, Temp: 99 °F (37.2 °C), Pulse: 80, Respirations: 18             PERTINENT ATTENDING PHYSICIAN COMMENTS:    I performed a history and physical examination of the patient and discussed management with the mid level provider. I reviewed the mid level provider's note and agree with the documented findings and plan of care. Any areas of disagreement are noted on the chart. I was personally present for the key portions of any procedures. I have documented in the chart those procedures where I was not present during the key portions. I have reviewed the emergency nurses triage note. I agree with the chief complaint, past medical history, past surgical history, allergies, medications, social and family history as documented unless otherwise noted below. Documentation of the HPI, Physical Exam and Medical Decision Making performed by mid level providers is based on my personal performance of the HPI, PE and MDM. For Residents, 
findings:        Interpretation per the Radiologist below, if available at the time of this note:    XR RIBS RIGHT INCLUDE CHEST (MIN 3 VIEWS)   Final Result   No acute abnormality of the right ribs.      No acute process in the lungs.               ED BEDSIDE ULTRASOUND:   Performed by ED Physician - none    LABS:  Labs Reviewed - No data to display    All other labs were within normal range or not returned as of this dictation.    EMERGENCY DEPARTMENT COURSE and DIFFERENTIAL DIAGNOSIS/MDM:   Vitals:    Vitals:    05/14/24 1348   BP: (!) 141/61   Pulse: 80   Resp: 18   Temp: 99 °F (37.2 °C)   TempSrc: Oral   SpO2: 94%   Weight: 91 kg (200 lb 9.9 oz)   Height: 1.52 m (4' 11.84\")           Medical Decision Making  This is a nontoxic-appearing 68-year-old female presenting to the emergency department reports yesterday afternoon she tripped over her cats, falling landing on her right side, she had no loss of consciousness denies a head injury; reports that she has been having right-sided rib pain and clicking noise, was concerned for rib fracture; the patient reports that she did take 1000 mg of acetaminophen yesterday which did help with the pain.  The patient is anticoagulated with Eliquis due to right DVT.    Patient was given Salonpas patch to help with pain.    Discussed with the patient completing CT of the brain without contrast due to the patient being anticoagulated with Eliquis and having a fall yesterday the patient reports that she has had no nausea or vomiting no headaches, and would like to politely declined having the CT of the brain without contrast at this time.    Right rib series with PA chest ordered to rule out rib fractures.    XR RIBS RIGHT INCLUDE CHEST (MIN 3 VIEWS)    Result Date: 5/14/2024  EXAMINATION: 4 XRAY VIEWS OF THE RIGHT RIBS WITH FRONTAL XRAY VIEW OF THE CHEST 5/14/2024 2:17 pm COMPARISON: None. HISTORY: ORDERING SYSTEM PROVIDED HISTORY: fall, right lower lateral-anterior rib

## 2024-05-14 NOTE — DISCHARGE INSTRUCTIONS
Continue with Tylenol over-the-counter as directed to help with pain.  Ice pack therapy as tolerated to help with pain.  Continue Salonpas patches over-the-counter as directed to help with pain.      Return to ER: Fevers, continued or worsening chest pain, shortness of breath or breathing difficulty, abdominal pain, vomiting, blood in the stool or urine, atypical headaches, mentation or behavior changes, weakness.

## 2024-05-16 ENCOUNTER — HOSPITAL ENCOUNTER (OUTPATIENT)
Dept: WOUND CARE | Age: 69
Discharge: HOME | End: 2024-05-16
Payer: MEDICARE

## 2024-05-16 VITALS
HEIGHT: 60 IN | BODY MASS INDEX: 39.27 KG/M2 | TEMPERATURE: 100 F | RESPIRATION RATE: 19 BRPM | DIASTOLIC BLOOD PRESSURE: 79 MMHG | SYSTOLIC BLOOD PRESSURE: 169 MMHG | WEIGHT: 200 LBS | HEART RATE: 79 BPM

## 2024-05-16 DIAGNOSIS — L98.492 ABDOMINAL WALL SKIN ULCER, WITH FAT LAYER EXPOSED (HCC): Primary | ICD-10-CM

## 2024-05-16 PROCEDURE — 99213 OFFICE O/P EST LOW 20 MIN: CPT

## 2024-05-16 PROCEDURE — 99203 OFFICE O/P NEW LOW 30 MIN: CPT

## 2024-05-16 PROCEDURE — 11042 DBRDMT SUBQ TIS 1ST 20SQCM/<: CPT

## 2024-05-16 RX ORDER — SODIUM CHLOR/HYPOCHLOROUS ACID 0.033 %
SOLUTION, IRRIGATION IRRIGATION ONCE
OUTPATIENT
Start: 2024-05-16 | End: 2024-05-16

## 2024-05-16 RX ORDER — CLOBETASOL PROPIONATE 0.5 MG/G
OINTMENT TOPICAL ONCE
OUTPATIENT
Start: 2024-05-16 | End: 2024-05-16

## 2024-05-16 RX ORDER — LIDOCAINE 40 MG/G
CREAM TOPICAL ONCE
OUTPATIENT
Start: 2024-05-16 | End: 2024-05-16

## 2024-05-16 RX ORDER — IBUPROFEN 200 MG
TABLET ORAL ONCE
OUTPATIENT
Start: 2024-05-16 | End: 2024-05-16

## 2024-05-16 RX ORDER — LIDOCAINE HYDROCHLORIDE 20 MG/ML
JELLY TOPICAL ONCE
OUTPATIENT
Start: 2024-05-16 | End: 2024-05-16

## 2024-05-16 RX ORDER — LIDOCAINE HYDROCHLORIDE 40 MG/ML
SOLUTION TOPICAL ONCE
OUTPATIENT
Start: 2024-05-16 | End: 2024-05-16

## 2024-05-16 RX ORDER — LIDOCAINE 50 MG/G
OINTMENT TOPICAL ONCE
OUTPATIENT
Start: 2024-05-16 | End: 2024-05-16

## 2024-05-16 RX ORDER — BETAMETHASONE DIPROPIONATE 0.5 MG/G
CREAM TOPICAL ONCE
OUTPATIENT
Start: 2024-05-16 | End: 2024-05-16

## 2024-05-16 RX ORDER — GENTAMICIN SULFATE 1 MG/G
OINTMENT TOPICAL ONCE
OUTPATIENT
Start: 2024-05-16 | End: 2024-05-16

## 2024-05-16 RX ORDER — GINSENG 100 MG
CAPSULE ORAL ONCE
OUTPATIENT
Start: 2024-05-16 | End: 2024-05-16

## 2024-05-16 RX ORDER — TRIAMCINOLONE ACETONIDE 1 MG/G
OINTMENT TOPICAL ONCE
OUTPATIENT
Start: 2024-05-16 | End: 2024-05-16

## 2024-05-16 RX ORDER — BACITRACIN ZINC AND POLYMYXIN B SULFATE 500; 1000 [USP'U]/G; [USP'U]/G
OINTMENT TOPICAL ONCE
OUTPATIENT
Start: 2024-05-16 | End: 2024-05-16

## 2024-05-16 NOTE — PROGRESS NOTES
[facility]   Progress Note and Procedure Note      Irais Max  MEDICAL RECORD NUMBER:  [id number]  AGE: 68 y.o.   GENDER: female  : 1955  EPISODE DATE:  2024    Subjective:     Chief Complaint   Patient presents with    Wound Check     abdomen         HISTORY of PRESENT ILLNESS HPI     Irais Max is a 68 y.o. female who presents today for wound/ulcer evaluation.     History of Wound Context: surgical wound s/p hernia mesh removal/transverse colon resection by Dr. Purdy 3/6/24. NPWT stopped 5/3 with referral to Wound Care Center by Dr. Purdy for delayed wound healing.    Wound/Ulcer Pain Timing/Severity: mild    Wound/Ulcer Identification:  Ulcer Type: non-healing surgical  Contributing Factors: diabetes and obesity          PAST MEDICAL HISTORY        Diagnosis Date    Abdominal pain 2024    with feve and malaise , CT scan done and Dr. Purdy recommended immediate surgery but pt refused, see notes    Anxiety     Asthma     COPD (chronic obstructive pulmonary disease) (HCC)     COVID-19 vaccine series completed     Diabetes mellitus (HCC)     Full dentures     upper and lower, does not want anyone to see her without her dentures in    Goiter     Hyperlipidemia     Hyperparathyroidism (HCC)     Hypertension     Lives alone     Obesity     Oxygen dependent     2L / NC prn    Recurrent ventral hernia 2023    Sleep apnea     CPAP    Under care of team     Pulmonologist,   Dr. Crawford, last seen 10/31/2023, clearance on chart, high risk    Under care of team     Gen Surg, Dr. Purdy, last seen 2024    Wears glasses     Wellness examination     PCP, Dr. Jones, last seen 2023 , clearance on chart       PAST SURGICAL HISTORY    Past Surgical History:   Procedure Laterality Date     SECTION  1976    EXPLORATORY LAPAROTOMY W/ BOWEL RESECTION  2024    EXPLORATORY LAPAROTOMY, BOWEL RESECTION    HERNIA REPAIR N/A 2024    XI ROBOTIC

## 2024-05-28 NOTE — DISCHARGE INSTRUCTIONS
[facility] -Phone: [phone] Fax: [phone]    Visit  Discharge Instructions / Physician Orders     DATE: 5/30/2024     Home Care: [facility]      SUPPLIES ORDERED THRU: Home Care     Wound Location: Mid Abdomen     Cleanse with: Liquid antibacterial soap and water, rinse well      Dressing Orders: Collagen with Silver to wound (Karime), Silicone Border Bandage (Excel SAP)                  Continue to use Silver Alginate until collagen with Silver is obtained     Frequency: Every Other Day     Additional Orders: Increase protein to diet (meat, cheese, eggs, fish, peanut butter, nuts and beans)     Your next appointment with Wound Care Center is in 2 weeks     (Please note your next appointment above and if you are unable to keep, kindly give a 24 hour notice. Thank you.)  If more than 15 min late we cannot guarantee you will be seen due to clinician schedule  Per Policy, Excessive cancellation will call for dismissal from program.     If you experience any of the following, please call the Wound Care Center during business hours:  [phone]     * Increase in Pain  * Temperature over 101  * Increase in drainage from your wound  * Drainage with a foul odor  * Bleeding  * Increase in swelling  * Need for compression bandage changes due to slippage, breakthrough drainage.     If you need medical attention outside of the business hours of the Wound Care Centers please contact your PCP or go to the an urgent care or emergency department     The information contained in the After Visit Summary has been reviewed with me, the patient and/or responsible adult, by my health care provider(s). I had the opportunity to ask questions regarding this information. I have elected to receive;      []After Visit Summary  [x]Comprehensive Discharge Instruction        Patient signature______________________________________Date:________  Electronically signed by Shreyas Greene RN on 5/30/2024 at 10:08

## 2024-05-30 ENCOUNTER — HOSPITAL ENCOUNTER (OUTPATIENT)
Dept: WOUND CARE | Age: 69
Discharge: HOME | End: 2024-05-30
Payer: MEDICARE

## 2024-05-30 VITALS
HEIGHT: 60 IN | WEIGHT: 200 LBS | HEART RATE: 63 BPM | DIASTOLIC BLOOD PRESSURE: 60 MMHG | SYSTOLIC BLOOD PRESSURE: 143 MMHG | RESPIRATION RATE: 18 BRPM | BODY MASS INDEX: 39.27 KG/M2 | TEMPERATURE: 99.1 F

## 2024-05-30 DIAGNOSIS — L98.492 ABDOMINAL WALL SKIN ULCER, WITH FAT LAYER EXPOSED (HCC): Primary | ICD-10-CM

## 2024-05-30 PROCEDURE — 11042 DBRDMT SUBQ TIS 1ST 20SQCM/<: CPT

## 2024-05-30 PROCEDURE — 11042 DBRDMT SUBQ TIS 1ST 20SQCM/<: CPT | Performed by: INTERNAL MEDICINE

## 2024-05-30 RX ORDER — LIDOCAINE HYDROCHLORIDE 20 MG/ML
JELLY TOPICAL ONCE
OUTPATIENT
Start: 2024-05-30 | End: 2024-05-30

## 2024-05-30 RX ORDER — BETAMETHASONE DIPROPIONATE 0.5 MG/G
CREAM TOPICAL ONCE
OUTPATIENT
Start: 2024-05-30 | End: 2024-05-30

## 2024-05-30 RX ORDER — LIDOCAINE HYDROCHLORIDE 40 MG/ML
SOLUTION TOPICAL ONCE
OUTPATIENT
Start: 2024-05-30 | End: 2024-05-30

## 2024-05-30 RX ORDER — CLOBETASOL PROPIONATE 0.5 MG/G
OINTMENT TOPICAL ONCE
OUTPATIENT
Start: 2024-05-30 | End: 2024-05-30

## 2024-05-30 RX ORDER — GINSENG 100 MG
CAPSULE ORAL ONCE
OUTPATIENT
Start: 2024-05-30 | End: 2024-05-30

## 2024-05-30 RX ORDER — LIDOCAINE 50 MG/G
OINTMENT TOPICAL ONCE
OUTPATIENT
Start: 2024-05-30 | End: 2024-05-30

## 2024-05-30 RX ORDER — IBUPROFEN 200 MG
TABLET ORAL ONCE
OUTPATIENT
Start: 2024-05-30 | End: 2024-05-30

## 2024-05-30 RX ORDER — LIDOCAINE HYDROCHLORIDE 20 MG/ML
JELLY TOPICAL ONCE
Status: COMPLETED | OUTPATIENT
Start: 2024-05-30 | End: 2024-05-30

## 2024-05-30 RX ORDER — GENTAMICIN SULFATE 1 MG/G
OINTMENT TOPICAL ONCE
OUTPATIENT
Start: 2024-05-30 | End: 2024-05-30

## 2024-05-30 RX ORDER — SODIUM CHLOR/HYPOCHLOROUS ACID 0.033 %
SOLUTION, IRRIGATION IRRIGATION ONCE
OUTPATIENT
Start: 2024-05-30 | End: 2024-05-30

## 2024-05-30 RX ORDER — BACITRACIN ZINC AND POLYMYXIN B SULFATE 500; 1000 [USP'U]/G; [USP'U]/G
OINTMENT TOPICAL ONCE
OUTPATIENT
Start: 2024-05-30 | End: 2024-05-30

## 2024-05-30 RX ORDER — TRIAMCINOLONE ACETONIDE 1 MG/G
OINTMENT TOPICAL ONCE
OUTPATIENT
Start: 2024-05-30 | End: 2024-05-30

## 2024-05-30 RX ORDER — LIDOCAINE 40 MG/G
CREAM TOPICAL ONCE
OUTPATIENT
Start: 2024-05-30 | End: 2024-05-30

## 2024-05-30 RX ADMIN — LIDOCAINE HYDROCHLORIDE 6 ML: 20 JELLY TOPICAL at 09:48

## 2024-05-30 NOTE — PROGRESS NOTES
[facility]   Progress Note and Procedure Note      Irais Max  MEDICAL RECORD NUMBER:  [id number]  AGE: 68 y.o.   GENDER: female  : 1955  EPISODE DATE:  2024    Subjective:     Chief Complaint   Patient presents with    Wound Check     abdomen         HISTORY of PRESENT ILLNESS HPI     Irais Max is a 68 y.o. female who presents today for wound/ulcer evaluation.     History of Wound Context: surgical wound s/p hernia mesh removal/transverse colon resection by Dr. Purdy 3/6/24.  The patient is here for right nonhealing abdominal wound, no purulent drainage, no surrounding or redness.  She denied significant pain, no fever or chills, no other complaints.    Wound/Ulcer Identification:  Ulcer Type: non-healing surgical  Contributing Factors: diabetes and obesity          PAST MEDICAL HISTORY        Diagnosis Date    Abdominal pain 2024    with feve and malaise , CT scan done and Dr. Purdy recommended immediate surgery but pt refused, see notes    Anxiety     Asthma     COPD (chronic obstructive pulmonary disease) (HCC)     COVID-19 vaccine series completed     Diabetes mellitus (HCC)     Full dentures     upper and lower, does not want anyone to see her without her dentures in    Goiter     Hyperlipidemia     Hyperparathyroidism (HCC)     Hypertension     Lives alone     Obesity     Oxygen dependent     2L / NC prn    Recurrent ventral hernia 2023    Sleep apnea     CPAP    Under care of team     Pulmonologist,   Dr. Crawford, last seen 10/31/2023, clearance on chart, high risk    Under care of team     Gen Surg, Dr. Puryd, last seen 2024    Wears glasses     Wellness examination     PCP, Dr. Jones, last seen 2023 , clearance on chart       PAST SURGICAL HISTORY    Past Surgical History:   Procedure Laterality Date     SECTION  1976    EXPLORATORY LAPAROTOMY W/ BOWEL RESECTION  2024    EXPLORATORY LAPAROTOMY, BOWEL RESECTION

## 2024-06-07 NOTE — DISCHARGE INSTRUCTIONS
[facility] -Phone: [phone] Fax: [phone]    Visit  Discharge Instructions / Physician Orders     DATE: 6/14/2024     Home Care: [facility]      SUPPLIES ORDERED THRU: Home Care     Wound Location: Mid Abdomen-healed     Cleanse with: Liquid antibacterial soap and water, rinse well      Dressing Orders: bandage to protect newly healed skin                       Frequency: change with showers for next week     Additional Orders: Increase protein to diet (meat, cheese, eggs, fish, peanut butter, nuts and beans)     Your next appointment with Wound Care Center is as needed     (Please note your next appointment above and if you are unable to keep, kindly give a 24 hour notice. Thank you.)  If more than 15 min late we cannot guarantee you will be seen due to clinician schedule  Per Policy, Excessive cancellation will call for dismissal from program.     If you experience any of the following, please call the Wound Care Center during business hours:  [phone]     * Increase in Pain  * Temperature over 101  * Increase in drainage from your wound  * Drainage with a foul odor  * Bleeding  * Increase in swelling  * Need for compression bandage changes due to slippage, breakthrough drainage.     If you need medical attention outside of the business hours of the Wound Care Centers please contact your PCP or go to the an urgent care or emergency department     The information contained in the After Visit Summary has been reviewed with me, the patient and/or responsible adult, by my health care provider(s). I had the opportunity to ask questions regarding this information. I have elected to receive;      []After Visit Summary  [x]Comprehensive Discharge Instruction        Patient signature______________________________________Date:________  Electronically signed by Adele Shepard RN on 6/14/2024 at 11:18 AM   Electronically signed by GODWIN MILLER - CNP on 6/14/2024 at 11:25

## 2024-06-14 ENCOUNTER — HOSPITAL ENCOUNTER (OUTPATIENT)
Dept: WOUND CARE | Age: 69
Discharge: HOME | End: 2024-06-14
Payer: MEDICARE

## 2024-06-14 VITALS
DIASTOLIC BLOOD PRESSURE: 50 MMHG | WEIGHT: 200 LBS | SYSTOLIC BLOOD PRESSURE: 138 MMHG | HEART RATE: 86 BPM | HEIGHT: 60 IN | TEMPERATURE: 99.4 F | BODY MASS INDEX: 39.27 KG/M2 | RESPIRATION RATE: 18 BRPM

## 2024-06-14 DIAGNOSIS — L98.492 ABDOMINAL WALL SKIN ULCER, WITH FAT LAYER EXPOSED (HCC): Primary | ICD-10-CM

## 2024-06-14 PROCEDURE — 99212 OFFICE O/P EST SF 10 MIN: CPT | Performed by: NURSE PRACTITIONER

## 2024-06-14 PROCEDURE — 99211 OFF/OP EST MAY X REQ PHY/QHP: CPT

## 2024-06-14 RX ORDER — LIDOCAINE HYDROCHLORIDE 40 MG/ML
SOLUTION TOPICAL ONCE
Status: CANCELLED | OUTPATIENT
Start: 2024-06-14 | End: 2024-06-14

## 2024-06-14 RX ORDER — LIDOCAINE 40 MG/G
CREAM TOPICAL ONCE
Status: CANCELLED | OUTPATIENT
Start: 2024-06-14 | End: 2024-06-14

## 2024-06-14 RX ORDER — CLOBETASOL PROPIONATE 0.5 MG/G
OINTMENT TOPICAL ONCE
Status: CANCELLED | OUTPATIENT
Start: 2024-06-14 | End: 2024-06-14

## 2024-06-14 RX ORDER — LIDOCAINE 50 MG/G
OINTMENT TOPICAL ONCE
Status: CANCELLED | OUTPATIENT
Start: 2024-06-14 | End: 2024-06-14

## 2024-06-14 RX ORDER — BETAMETHASONE DIPROPIONATE 0.5 MG/G
CREAM TOPICAL ONCE
Status: CANCELLED | OUTPATIENT
Start: 2024-06-14 | End: 2024-06-14

## 2024-06-14 RX ORDER — GENTAMICIN SULFATE 1 MG/G
OINTMENT TOPICAL ONCE
Status: CANCELLED | OUTPATIENT
Start: 2024-06-14 | End: 2024-06-14

## 2024-06-14 RX ORDER — LIDOCAINE HYDROCHLORIDE 20 MG/ML
JELLY TOPICAL ONCE
Status: DISCONTINUED | OUTPATIENT
Start: 2024-06-14 | End: 2024-06-14

## 2024-06-14 RX ORDER — IBUPROFEN 200 MG
TABLET ORAL ONCE
Status: CANCELLED | OUTPATIENT
Start: 2024-06-14 | End: 2024-06-14

## 2024-06-14 RX ORDER — LIDOCAINE HYDROCHLORIDE 20 MG/ML
JELLY TOPICAL ONCE
Status: CANCELLED | OUTPATIENT
Start: 2024-06-14 | End: 2024-06-14

## 2024-06-14 RX ORDER — BACITRACIN ZINC AND POLYMYXIN B SULFATE 500; 1000 [USP'U]/G; [USP'U]/G
OINTMENT TOPICAL ONCE
Status: CANCELLED | OUTPATIENT
Start: 2024-06-14 | End: 2024-06-14

## 2024-06-14 RX ORDER — SODIUM CHLOR/HYPOCHLOROUS ACID 0.033 %
SOLUTION, IRRIGATION IRRIGATION ONCE
Status: CANCELLED | OUTPATIENT
Start: 2024-06-14 | End: 2024-06-14

## 2024-06-14 RX ORDER — TRIAMCINOLONE ACETONIDE 1 MG/G
OINTMENT TOPICAL ONCE
Status: CANCELLED | OUTPATIENT
Start: 2024-06-14 | End: 2024-06-14

## 2024-06-14 RX ORDER — GINSENG 100 MG
CAPSULE ORAL ONCE
Status: CANCELLED | OUTPATIENT
Start: 2024-06-14 | End: 2024-06-14

## 2024-06-14 ASSESSMENT — ENCOUNTER SYMPTOMS
SHORTNESS OF BREATH: 0
COUGH: 0
DIARRHEA: 0
NAUSEA: 0
VOMITING: 0
RHINORRHEA: 0

## 2024-06-14 NOTE — PROGRESS NOTES
[facility]   Progress Note and Procedure Note      Irais Max  MEDICAL RECORD NUMBER:  [id number]  AGE: 68 y.o.   GENDER: female  : 1955  EPISODE DATE:  2024    Subjective:     Chief Complaint   Patient presents with    Wound Check     Abdomen         HISTORY of PRESENT ILLNESS HPI     Irais Max is a 68 y.o. female who presents today for wound/ulcer evaluation.   History of Wound Context: here to follow up on midline abdominal wound that is healed today. She will follow up here as needed.   Wound/Ulcer Pain Timing/Severity: none  Quality of pain: N/A  Severity:  0 / 10   Modifying Factors: None  Associated Signs/Symptoms: none    Ulcer Identification:  Ulcer Type: non-healing surgical  Contributing Factors: diabetes and obesity    Wound: N/A        PAST MEDICAL HISTORY        Diagnosis Date    Abdominal pain 2024    with feve and malaise , CT scan done and Dr. Purdy recommended immediate surgery but pt refused, see notes    Anxiety     Asthma     COPD (chronic obstructive pulmonary disease) (HCC)     COVID-19 vaccine series completed     Diabetes mellitus (HCC)     Full dentures     upper and lower, does not want anyone to see her without her dentures in    Goiter     Hyperlipidemia     Hyperparathyroidism (HCC)     Hypertension     Lives alone     Obesity     Oxygen dependent     2L / NC prn    Recurrent ventral hernia 2023    Sleep apnea     CPAP    Under care of team     Pulmonologist,   Dr. Crawford, last seen 10/31/2023, clearance on chart, high risk    Under care of team     Gen Surg, Dr. Purdy, last seen 2024    Wears glasses     Wellness examination     PCP, Dr. Jones, last seen 2023 , clearance on chart       PAST SURGICAL HISTORY    Past Surgical History:   Procedure Laterality Date     SECTION  1976    EXPLORATORY LAPAROTOMY W/ BOWEL RESECTION  2024    EXPLORATORY LAPAROTOMY, BOWEL RESECTION    HERNIA REPAIR N/A

## 2024-06-18 ENCOUNTER — HOSPITAL ENCOUNTER (OUTPATIENT)
Age: 69
Setting detail: SPECIMEN
Discharge: HOME | End: 2024-06-18
Payer: MEDICARE

## 2024-06-18 LAB
25(OH)D3 SERPL-MCNC: 24.9 NG/ML (ref 30–100)
ALBUMIN SERPL-MCNC: 4.3 G/DL (ref 3.5–5.2)
ALBUMIN/GLOB SERPL: 2 {RATIO} (ref 1–2.5)
ALP SERPL-CCNC: 92 U/L (ref 35–104)
ALT SERPL-CCNC: 12 U/L (ref 10–35)
ANION GAP SERPL CALCULATED.3IONS-SCNC: 13 MMOL/L (ref 9–16)
AST SERPL-CCNC: 26 U/L (ref 10–35)
BACTERIA URNS QL MICRO: NORMAL
BILIRUB SERPL-MCNC: 0.4 MG/DL (ref 0–1.2)
BILIRUB UR QL STRIP: NEGATIVE
BUN SERPL-MCNC: 17 MG/DL (ref 8–23)
CA-I BLD-SCNC: 1.34 MMOL/L (ref 1.13–1.33)
CALCIUM SERPL-MCNC: 10 MG/DL (ref 8.6–10.4)
CASTS #/AREA URNS LPF: NORMAL /LPF (ref 0–8)
CHLORIDE SERPL-SCNC: 100 MMOL/L (ref 98–107)
CLARITY UR: CLEAR
CO2 SERPL-SCNC: 27 MMOL/L (ref 20–31)
COLOR UR: YELLOW
CREAT SERPL-MCNC: 0.6 MG/DL (ref 0.5–0.9)
CREAT UR-MCNC: 21.8 MG/DL (ref 28–217)
EPI CELLS #/AREA URNS HPF: NORMAL /HPF (ref 0–5)
EST. AVERAGE GLUCOSE BLD GHB EST-MCNC: 131 MG/DL
FERRITIN SERPL-MCNC: 16 NG/ML (ref 13–150)
FOLATE SERPL-MCNC: 11.4 NG/ML (ref 4.8–24.2)
GFR, ESTIMATED: >90 ML/MIN/1.73M2
GLUCOSE SERPL-MCNC: 110 MG/DL (ref 74–99)
GLUCOSE UR STRIP-MCNC: ABNORMAL MG/DL
HBA1C MFR BLD: 6.2 % (ref 4–6)
HGB UR QL STRIP.AUTO: NEGATIVE
IRON SATN MFR SERPL: 14 % (ref 20–55)
IRON SERPL-MCNC: 58 UG/DL (ref 37–145)
KETONES UR STRIP-MCNC: NEGATIVE MG/DL
LEUKOCYTE ESTERASE UR QL STRIP: ABNORMAL
MAGNESIUM SERPL-MCNC: 2.3 MG/DL (ref 1.6–2.4)
MICROALBUMIN UR-MCNC: 52 MG/L (ref 0–20)
MICROALBUMIN/CREAT UR-RTO: 239 MCG/MG CREAT (ref 0–25)
NITRITE UR QL STRIP: NEGATIVE
PH UR STRIP: 6 [PH] (ref 5–8)
PHOSPHATE SERPL-MCNC: 3.9 MG/DL (ref 2.5–4.5)
POTASSIUM SERPL-SCNC: 3.9 MMOL/L (ref 3.7–5.3)
PROT SERPL-MCNC: 7.1 G/DL (ref 6.6–8.7)
PROT UR STRIP-MCNC: NEGATIVE MG/DL
PTH-INTACT SERPL-MCNC: 66 PG/ML (ref 15–65)
RBC #/AREA URNS HPF: NORMAL /HPF (ref 0–4)
SODIUM SERPL-SCNC: 140 MMOL/L (ref 136–145)
SP GR UR STRIP: 1.02 (ref 1–1.03)
T3FREE SERPL-MCNC: 2.5 PG/ML (ref 2–4.4)
T4 FREE SERPL-MCNC: 0.9 NG/DL (ref 0.92–1.68)
TIBC SERPL-MCNC: 422 UG/DL (ref 250–450)
TSH SERPL DL<=0.05 MIU/L-ACNC: 3 UIU/ML (ref 0.27–4.2)
UNSATURATED IRON BINDING CAPACITY: 364 UG/DL (ref 112–347)
UROBILINOGEN UR STRIP-ACNC: NORMAL EU/DL (ref 0–1)
VIT B12 SERPL-MCNC: 326 PG/ML (ref 232–1245)
WBC #/AREA URNS HPF: NORMAL /HPF (ref 0–5)

## 2024-06-18 PROCEDURE — 82607 VITAMIN B-12: CPT

## 2024-06-18 PROCEDURE — 83970 ASSAY OF PARATHORMONE: CPT

## 2024-06-18 PROCEDURE — 83550 IRON BINDING TEST: CPT

## 2024-06-18 PROCEDURE — 82306 VITAMIN D 25 HYDROXY: CPT

## 2024-06-18 PROCEDURE — 83540 ASSAY OF IRON: CPT

## 2024-06-18 PROCEDURE — 84481 FREE ASSAY (FT-3): CPT

## 2024-06-18 PROCEDURE — 84443 ASSAY THYROID STIM HORMONE: CPT

## 2024-06-18 PROCEDURE — 83735 ASSAY OF MAGNESIUM: CPT

## 2024-06-18 PROCEDURE — 82330 ASSAY OF CALCIUM: CPT

## 2024-06-18 PROCEDURE — 82728 ASSAY OF FERRITIN: CPT

## 2024-06-18 PROCEDURE — 82746 ASSAY OF FOLIC ACID SERUM: CPT

## 2024-06-18 PROCEDURE — 84439 ASSAY OF FREE THYROXINE: CPT

## 2024-06-18 PROCEDURE — 86376 MICROSOMAL ANTIBODY EACH: CPT

## 2024-06-18 PROCEDURE — 86800 THYROGLOBULIN ANTIBODY: CPT

## 2024-06-18 PROCEDURE — 83036 HEMOGLOBIN GLYCOSYLATED A1C: CPT

## 2024-06-18 PROCEDURE — 84100 ASSAY OF PHOSPHORUS: CPT

## 2024-06-18 PROCEDURE — 81001 URINALYSIS AUTO W/SCOPE: CPT

## 2024-06-18 PROCEDURE — 80053 COMPREHEN METABOLIC PANEL: CPT

## 2024-06-18 PROCEDURE — 82570 ASSAY OF URINE CREATININE: CPT

## 2024-06-18 PROCEDURE — 82043 UR ALBUMIN QUANTITATIVE: CPT

## 2024-06-20 LAB
THYROGLOBULIN AB: <12 IU/ML (ref 0–40)
THYROPEROXIDASE AB SERPL IA-ACNC: <4 IU/ML (ref 0–25)

## 2024-09-10 ENCOUNTER — HOSPITAL ENCOUNTER (OUTPATIENT)
Dept: VASCULAR LAB | Age: 69
Discharge: HOME | End: 2024-09-12
Attending: FAMILY MEDICINE
Payer: MEDICARE

## 2024-09-10 DIAGNOSIS — I82.421: ICD-10-CM

## 2024-09-10 PROCEDURE — 93971 EXTREMITY STUDY: CPT | Performed by: SURGERY

## 2024-09-10 PROCEDURE — 93971 EXTREMITY STUDY: CPT

## 2024-11-12 ENCOUNTER — HOSPITAL ENCOUNTER (OUTPATIENT)
Dept: VASCULAR LAB | Age: 69
Discharge: HOME | End: 2024-11-14
Attending: FAMILY MEDICINE
Payer: MEDICAID

## 2024-11-12 DIAGNOSIS — M79.605 PAIN IN LEFT LEG: ICD-10-CM

## 2024-11-12 PROCEDURE — 93971 EXTREMITY STUDY: CPT

## 2024-11-20 ENCOUNTER — HOSPITAL ENCOUNTER (OUTPATIENT)
Age: 69
Discharge: HOME | End: 2024-11-20
Payer: MEDICARE

## 2024-11-20 LAB
ALBUMIN SERPL-MCNC: 4.3 G/DL (ref 3.5–5.2)
ALBUMIN/GLOB SERPL: 1.5 {RATIO} (ref 1–2.5)
ALP SERPL-CCNC: 76 U/L (ref 35–104)
ALT SERPL-CCNC: 20 U/L (ref 10–35)
ANION GAP SERPL CALCULATED.3IONS-SCNC: 10 MMOL/L (ref 9–16)
AST SERPL-CCNC: 24 U/L (ref 10–35)
BACTERIA URNS QL MICRO: ABNORMAL
BASOPHILS # BLD: 0.05 K/UL (ref 0–0.2)
BASOPHILS NFR BLD: 1 % (ref 0–2)
BILIRUB SERPL-MCNC: 0.7 MG/DL (ref 0–1.2)
BILIRUB UR QL STRIP: NEGATIVE
BUN SERPL-MCNC: 17 MG/DL (ref 8–23)
CA-I BLD-SCNC: 1.37 MMOL/L (ref 1.13–1.33)
CALCIUM SERPL-MCNC: 9.9 MG/DL (ref 8.6–10.4)
CHLORIDE SERPL-SCNC: 102 MMOL/L (ref 98–107)
CHOLEST SERPL-MCNC: 264 MG/DL (ref 0–199)
CHOLESTEROL/HDL RATIO: 4.7
CLARITY UR: CLEAR
CO2 SERPL-SCNC: 26 MMOL/L (ref 20–31)
COLOR UR: YELLOW
CREAT SERPL-MCNC: 0.7 MG/DL (ref 0.6–0.9)
CREAT UR-MCNC: 47.9 MG/DL (ref 28–217)
EOSINOPHIL # BLD: 0.22 K/UL (ref 0–0.44)
EOSINOPHILS RELATIVE PERCENT: 2 % (ref 1–4)
EPI CELLS #/AREA URNS HPF: ABNORMAL /HPF (ref 0–5)
ERYTHROCYTE [DISTWIDTH] IN BLOOD BY AUTOMATED COUNT: 13.9 % (ref 11.8–14.4)
EST. AVERAGE GLUCOSE BLD GHB EST-MCNC: 148 MG/DL
FERRITIN SERPL-MCNC: 59 NG/ML
FOLATE SERPL-MCNC: 12.4 NG/ML (ref 4.8–24.2)
GFR, ESTIMATED: >90 ML/MIN/1.73M2
GLUCOSE SERPL-MCNC: 152 MG/DL (ref 74–99)
GLUCOSE UR STRIP-MCNC: ABNORMAL MG/DL
HBA1C MFR BLD: 6.8 % (ref 4–6)
HCT VFR BLD AUTO: 49.7 % (ref 36.3–47.1)
HDLC SERPL-MCNC: 56 MG/DL
HGB BLD-MCNC: 15.2 G/DL (ref 11.9–15.1)
HGB UR QL STRIP.AUTO: ABNORMAL
IMM GRANULOCYTES # BLD AUTO: 0.04 K/UL (ref 0–0.3)
IMM GRANULOCYTES NFR BLD: 0 %
IRON SATN MFR SERPL: 18 % (ref 20–55)
IRON SERPL-MCNC: 62 UG/DL (ref 37–145)
KETONES UR STRIP-MCNC: NEGATIVE MG/DL
LDLC SERPL CALC-MCNC: 164 MG/DL (ref 0–100)
LEUKOCYTE ESTERASE UR QL STRIP: NEGATIVE
LYMPHOCYTES NFR BLD: 1.94 K/UL (ref 1.1–3.7)
LYMPHOCYTES RELATIVE PERCENT: 21 % (ref 24–43)
MCH RBC QN AUTO: 28.1 PG (ref 25.2–33.5)
MCHC RBC AUTO-ENTMCNC: 30.6 G/DL (ref 28.4–34.8)
MCV RBC AUTO: 91.9 FL (ref 82.6–102.9)
MICROALBUMIN UR-MCNC: 15 MG/L (ref 0–20)
MICROALBUMIN/CREAT UR-RTO: 31 MCG/MG CREAT (ref 0–25)
MONOCYTES NFR BLD: 0.79 K/UL (ref 0.1–1.2)
MONOCYTES NFR BLD: 9 % (ref 3–12)
NEUTROPHILS NFR BLD: 67 % (ref 36–65)
NEUTS SEG NFR BLD: 6.29 K/UL (ref 1.5–8.1)
NITRITE UR QL STRIP: NEGATIVE
NRBC BLD-RTO: 0 PER 100 WBC
PH UR STRIP: 6 [PH] (ref 5–8)
PHOSPHATE SERPL-MCNC: 3.2 MG/DL (ref 2.5–4.5)
PLATELET # BLD AUTO: 238 K/UL (ref 138–453)
PMV BLD AUTO: 11.8 FL (ref 8.1–13.5)
POTASSIUM SERPL-SCNC: 4.3 MMOL/L (ref 3.7–5.3)
PROT SERPL-MCNC: 7.2 G/DL (ref 6.6–8.7)
PROT UR STRIP-MCNC: NEGATIVE MG/DL
PTH-INTACT SERPL-MCNC: 73 PG/ML (ref 15–65)
RBC # BLD AUTO: 5.41 M/UL (ref 3.95–5.11)
RBC #/AREA URNS HPF: ABNORMAL /HPF (ref 0–2)
SODIUM SERPL-SCNC: 138 MMOL/L (ref 136–145)
SP GR UR STRIP: 1.02 (ref 1–1.03)
T3FREE SERPL-MCNC: 2.9 PG/ML (ref 2–4.4)
T4 FREE SERPL-MCNC: 1 NG/DL (ref 0.9–1.7)
TIBC SERPL-MCNC: 354 UG/DL (ref 250–450)
TRIGL SERPL-MCNC: 220 MG/DL
TSH SERPL DL<=0.05 MIU/L-ACNC: 2.36 UIU/ML (ref 0.27–4.2)
UNSATURATED IRON BINDING CAPACITY: 292 UG/DL (ref 112–347)
UROBILINOGEN UR STRIP-ACNC: NORMAL EU/DL (ref 0–1)
VIT B12 SERPL-MCNC: 254 PG/ML (ref 232–1245)
VLDLC SERPL CALC-MCNC: 44 MG/DL (ref 1–30)
WBC #/AREA URNS HPF: ABNORMAL /HPF (ref 0–5)
WBC OTHER # BLD: 9.3 K/UL (ref 3.5–11.3)
YEAST URNS QL MICRO: ABNORMAL

## 2024-11-20 PROCEDURE — 83036 HEMOGLOBIN GLYCOSYLATED A1C: CPT

## 2024-11-20 PROCEDURE — 82728 ASSAY OF FERRITIN: CPT

## 2024-11-20 PROCEDURE — 82607 VITAMIN B-12: CPT

## 2024-11-20 PROCEDURE — 36415 COLL VENOUS BLD VENIPUNCTURE: CPT

## 2024-11-20 PROCEDURE — 83970 ASSAY OF PARATHORMONE: CPT

## 2024-11-20 PROCEDURE — 85025 COMPLETE CBC W/AUTO DIFF WBC: CPT

## 2024-11-20 PROCEDURE — 86800 THYROGLOBULIN ANTIBODY: CPT

## 2024-11-20 PROCEDURE — 84100 ASSAY OF PHOSPHORUS: CPT

## 2024-11-20 PROCEDURE — 80061 LIPID PANEL: CPT

## 2024-11-20 PROCEDURE — 83550 IRON BINDING TEST: CPT

## 2024-11-20 PROCEDURE — 82043 UR ALBUMIN QUANTITATIVE: CPT

## 2024-11-20 PROCEDURE — 84443 ASSAY THYROID STIM HORMONE: CPT

## 2024-11-20 PROCEDURE — 82746 ASSAY OF FOLIC ACID SERUM: CPT

## 2024-11-20 PROCEDURE — 84481 FREE ASSAY (FT-3): CPT

## 2024-11-20 PROCEDURE — 80053 COMPREHEN METABOLIC PANEL: CPT

## 2024-11-20 PROCEDURE — 83540 ASSAY OF IRON: CPT

## 2024-11-20 PROCEDURE — 84439 ASSAY OF FREE THYROXINE: CPT

## 2024-11-20 PROCEDURE — 82570 ASSAY OF URINE CREATININE: CPT

## 2024-11-20 PROCEDURE — 81001 URINALYSIS AUTO W/SCOPE: CPT

## 2024-11-20 PROCEDURE — 82330 ASSAY OF CALCIUM: CPT

## 2024-11-21 LAB — THYROGLOBULIN AB: 13 IU/ML (ref 0–40)

## 2024-12-20 ENCOUNTER — HOSPITAL ENCOUNTER (OUTPATIENT)
Dept: CT IMAGING | Age: 69
Discharge: HOME | End: 2024-12-22
Attending: FAMILY MEDICINE
Payer: MEDICARE

## 2024-12-20 DIAGNOSIS — R10.84 GENERALIZED ABDOMINAL PAIN: ICD-10-CM

## 2024-12-20 PROCEDURE — 6360000004 HC RX CONTRAST MEDICATION: Performed by: FAMILY MEDICINE

## 2024-12-20 PROCEDURE — 74177 CT ABD & PELVIS W/CONTRAST: CPT

## 2024-12-20 PROCEDURE — 2500000003 HC RX 250 WO HCPCS: Performed by: FAMILY MEDICINE

## 2024-12-20 PROCEDURE — 2580000003 HC RX 258: Performed by: FAMILY MEDICINE

## 2024-12-20 RX ORDER — SODIUM CHLORIDE 0.9 % (FLUSH) 0.9 %
10 SYRINGE (ML) INJECTION PRN
Status: DISCONTINUED | OUTPATIENT
Start: 2024-12-20 | End: 2024-12-23 | Stop reason: HOSPADM

## 2024-12-20 RX ORDER — 0.9 % SODIUM CHLORIDE 0.9 %
80 INTRAVENOUS SOLUTION INTRAVENOUS ONCE
Status: COMPLETED | OUTPATIENT
Start: 2024-12-20 | End: 2024-12-20

## 2024-12-20 RX ORDER — IOPAMIDOL 755 MG/ML
75 INJECTION, SOLUTION INTRAVASCULAR
Status: COMPLETED | OUTPATIENT
Start: 2024-12-20 | End: 2024-12-20

## 2024-12-20 RX ADMIN — SODIUM CHLORIDE, PRESERVATIVE FREE 10 ML: 5 INJECTION INTRAVENOUS at 10:18

## 2024-12-20 RX ADMIN — IOPAMIDOL 75 ML: 755 INJECTION, SOLUTION INTRAVENOUS at 10:18

## 2024-12-20 RX ADMIN — SODIUM CHLORIDE 80 ML: 9 INJECTION, SOLUTION INTRAVENOUS at 10:19

## 2024-12-26 ENCOUNTER — APPOINTMENT (OUTPATIENT)
Dept: GENERAL RADIOLOGY | Age: 69
End: 2024-12-26
Payer: MEDICARE

## 2024-12-26 ENCOUNTER — HOSPITAL ENCOUNTER (INPATIENT)
Age: 69
LOS: 4 days | Discharge: HOME HEALTH | End: 2024-12-30
Attending: EMERGENCY MEDICINE | Admitting: FAMILY MEDICINE
Payer: MEDICARE

## 2024-12-26 DIAGNOSIS — R09.02 HYPOXIA: ICD-10-CM

## 2024-12-26 DIAGNOSIS — J18.9 PNEUMONIA OF LEFT LUNG DUE TO INFECTIOUS ORGANISM, UNSPECIFIED PART OF LUNG: Primary | ICD-10-CM

## 2024-12-26 PROBLEM — J44.0: Status: ACTIVE | Noted: 2024-12-26

## 2024-12-26 PROBLEM — E11.65 TYPE 2 DIABETES MELLITUS WITH HYPERGLYCEMIA, WITHOUT LONG-TERM CURRENT USE OF INSULIN (HCC): Status: ACTIVE | Noted: 2024-12-26

## 2024-12-26 PROBLEM — I11.9 HYPERTENSIVE HEART DISEASE WITHOUT HEART FAILURE: Status: ACTIVE | Noted: 2024-12-26

## 2024-12-26 PROBLEM — Z86.718 HX OF DEEP VENOUS THROMBOSIS: Status: ACTIVE | Noted: 2024-12-26

## 2024-12-26 PROBLEM — I12.9 HYPERTENSIVE KIDNEY DISEASE WITH STAGE 2 CHRONIC KIDNEY DISEASE: Status: ACTIVE | Noted: 2024-12-26

## 2024-12-26 PROBLEM — E78.2 MIXED HYPERLIPIDEMIA: Status: ACTIVE | Noted: 2024-12-26

## 2024-12-26 PROBLEM — Z87.891 FORMER SMOKER: Status: ACTIVE | Noted: 2024-12-26

## 2024-12-26 PROBLEM — Z91.199 NONCOMPLIANCE WITH CPAP TREATMENT: Status: ACTIVE | Noted: 2024-12-26

## 2024-12-26 PROBLEM — J96.21 ACUTE ON CHRONIC HYPOXIC RESPIRATORY FAILURE: Status: ACTIVE | Noted: 2024-12-26

## 2024-12-26 PROBLEM — N18.2 HYPERTENSIVE KIDNEY DISEASE WITH STAGE 2 CHRONIC KIDNEY DISEASE: Status: ACTIVE | Noted: 2024-12-26

## 2024-12-26 PROBLEM — N25.81 SECONDARY HYPERPARATHYROIDISM (HCC): Status: ACTIVE | Noted: 2024-12-26

## 2024-12-26 PROBLEM — J44.1 COPD WITH ACUTE EXACERBATION (HCC): Status: ACTIVE | Noted: 2024-12-26

## 2024-12-26 PROBLEM — G47.33 OBSTRUCTIVE SLEEP APNEA SYNDROME: Status: ACTIVE | Noted: 2024-12-26

## 2024-12-26 LAB
ANION GAP SERPL CALCULATED.3IONS-SCNC: 14 MMOL/L (ref 9–17)
BASOPHILS # BLD: 0.16 K/UL (ref 0–0.2)
BASOPHILS NFR BLD: 1 % (ref 0–2)
BUN SERPL-MCNC: 12 MG/DL (ref 8–23)
CALCIUM SERPL-MCNC: 10.4 MG/DL (ref 8.6–10.4)
CHLORIDE SERPL-SCNC: 96 MMOL/L (ref 98–107)
CO2 SERPL-SCNC: 25 MMOL/L (ref 20–31)
CREAT SERPL-MCNC: 0.5 MG/DL (ref 0.5–0.9)
EOSINOPHIL # BLD: 0.47 K/UL (ref 0–0.4)
EOSINOPHILS RELATIVE PERCENT: 3 % (ref 1–4)
ERYTHROCYTE [DISTWIDTH] IN BLOOD BY AUTOMATED COUNT: 14.6 % (ref 12.5–15.4)
FLUAV AG SPEC QL: NEGATIVE
FLUBV AG SPEC QL: NEGATIVE
GFR, ESTIMATED: >90 ML/MIN/1.73M2
GLUCOSE BLD-MCNC: 183 MG/DL (ref 65–105)
GLUCOSE SERPL-MCNC: 122 MG/DL (ref 70–99)
HCT VFR BLD AUTO: 42.7 % (ref 36–46)
HGB BLD-MCNC: 13.9 G/DL (ref 12–16)
LACTATE BLDV-SCNC: 0.8 MMOL/L (ref 0.5–2.2)
LYMPHOCYTES NFR BLD: 2.36 K/UL (ref 1–4.8)
LYMPHOCYTES RELATIVE PERCENT: 15 % (ref 24–44)
MCH RBC QN AUTO: 29.1 PG (ref 26–34)
MCHC RBC AUTO-ENTMCNC: 32.5 G/DL (ref 31–37)
MCV RBC AUTO: 89.5 FL (ref 80–100)
MONOCYTES NFR BLD: 0.94 K/UL (ref 0.1–0.8)
MONOCYTES NFR BLD: 6 % (ref 1–7)
MORPHOLOGY: NORMAL
NEUTROPHILS NFR BLD: 75 % (ref 36–66)
NEUTS SEG NFR BLD: 11.77 K/UL (ref 1.8–7.7)
PLATELET # BLD AUTO: 331 K/UL (ref 140–450)
PMV BLD AUTO: 8.4 FL (ref 6–12)
POTASSIUM SERPL-SCNC: 4 MMOL/L (ref 3.7–5.3)
RBC # BLD AUTO: 4.77 M/UL (ref 4–5.2)
SARS-COV-2 RDRP RESP QL NAA+PROBE: NOT DETECTED
SODIUM SERPL-SCNC: 135 MMOL/L (ref 135–144)
SPECIMEN DESCRIPTION: NORMAL
TROPONIN I SERPL HS-MCNC: 8 NG/L (ref 0–14)
WBC OTHER # BLD: 15.7 K/UL (ref 3.5–11)

## 2024-12-26 PROCEDURE — 71046 X-RAY EXAM CHEST 2 VIEWS: CPT

## 2024-12-26 PROCEDURE — 2500000003 HC RX 250 WO HCPCS: Performed by: NURSE PRACTITIONER

## 2024-12-26 PROCEDURE — 83605 ASSAY OF LACTIC ACID: CPT

## 2024-12-26 PROCEDURE — 2500000003 HC RX 250 WO HCPCS

## 2024-12-26 PROCEDURE — 84484 ASSAY OF TROPONIN QUANT: CPT

## 2024-12-26 PROCEDURE — 36415 COLL VENOUS BLD VENIPUNCTURE: CPT

## 2024-12-26 PROCEDURE — 6360000002 HC RX W HCPCS: Performed by: NURSE PRACTITIONER

## 2024-12-26 PROCEDURE — 6370000000 HC RX 637 (ALT 250 FOR IP)

## 2024-12-26 PROCEDURE — 94669 MECHANICAL CHEST WALL OSCILL: CPT

## 2024-12-26 PROCEDURE — 2580000003 HC RX 258: Performed by: NURSE PRACTITIONER

## 2024-12-26 PROCEDURE — 86738 MYCOPLASMA ANTIBODY: CPT

## 2024-12-26 PROCEDURE — 87804 INFLUENZA ASSAY W/OPTIC: CPT

## 2024-12-26 PROCEDURE — 1200000000 HC SEMI PRIVATE

## 2024-12-26 PROCEDURE — 2700000000 HC OXYGEN THERAPY PER DAY

## 2024-12-26 PROCEDURE — 6370000000 HC RX 637 (ALT 250 FOR IP): Performed by: NURSE PRACTITIONER

## 2024-12-26 PROCEDURE — 93005 ELECTROCARDIOGRAM TRACING: CPT | Performed by: NURSE PRACTITIONER

## 2024-12-26 PROCEDURE — 85025 COMPLETE CBC W/AUTO DIFF WBC: CPT

## 2024-12-26 PROCEDURE — 94640 AIRWAY INHALATION TREATMENT: CPT

## 2024-12-26 PROCEDURE — 87635 SARS-COV-2 COVID-19 AMP PRB: CPT

## 2024-12-26 PROCEDURE — 80048 BASIC METABOLIC PNL TOTAL CA: CPT

## 2024-12-26 PROCEDURE — 82947 ASSAY GLUCOSE BLOOD QUANT: CPT

## 2024-12-26 PROCEDURE — 87040 BLOOD CULTURE FOR BACTERIA: CPT

## 2024-12-26 PROCEDURE — 99285 EMERGENCY DEPT VISIT HI MDM: CPT

## 2024-12-26 PROCEDURE — 94761 N-INVAS EAR/PLS OXIMETRY MLT: CPT

## 2024-12-26 RX ORDER — ONDANSETRON 2 MG/ML
4 INJECTION INTRAMUSCULAR; INTRAVENOUS EVERY 6 HOURS PRN
Status: DISCONTINUED | OUTPATIENT
Start: 2024-12-26 | End: 2024-12-30 | Stop reason: HOSPADM

## 2024-12-26 RX ORDER — SODIUM CHLORIDE 0.9 % (FLUSH) 0.9 %
5-40 SYRINGE (ML) INJECTION EVERY 12 HOURS SCHEDULED
Status: DISCONTINUED | OUTPATIENT
Start: 2024-12-26 | End: 2024-12-30 | Stop reason: HOSPADM

## 2024-12-26 RX ORDER — GUAIFENESIN 600 MG/1
600 TABLET, EXTENDED RELEASE ORAL 2 TIMES DAILY
Status: DISCONTINUED | OUTPATIENT
Start: 2024-12-26 | End: 2024-12-28

## 2024-12-26 RX ORDER — POLYETHYLENE GLYCOL 3350 17 G/17G
17 POWDER, FOR SOLUTION ORAL DAILY PRN
Status: DISCONTINUED | OUTPATIENT
Start: 2024-12-26 | End: 2024-12-30 | Stop reason: HOSPADM

## 2024-12-26 RX ORDER — ASCORBIC ACID 500 MG
500 TABLET ORAL DAILY
Status: DISCONTINUED | OUTPATIENT
Start: 2024-12-26 | End: 2024-12-30 | Stop reason: HOSPADM

## 2024-12-26 RX ORDER — ORAL SEMAGLUTIDE 3 MG/1
3 TABLET ORAL DAILY
COMMUNITY

## 2024-12-26 RX ORDER — IPRATROPIUM BROMIDE AND ALBUTEROL SULFATE 2.5; .5 MG/3ML; MG/3ML
1 SOLUTION RESPIRATORY (INHALATION)
Status: CANCELLED | OUTPATIENT
Start: 2024-12-26

## 2024-12-26 RX ORDER — ALBUTEROL SULFATE 0.83 MG/ML
2.5 SOLUTION RESPIRATORY (INHALATION) EVERY 6 HOURS PRN
Status: DISCONTINUED | OUTPATIENT
Start: 2024-12-26 | End: 2024-12-30 | Stop reason: HOSPADM

## 2024-12-26 RX ORDER — ENOXAPARIN SODIUM 100 MG/ML
40 INJECTION SUBCUTANEOUS DAILY
Status: DISCONTINUED | OUTPATIENT
Start: 2024-12-26 | End: 2024-12-30 | Stop reason: HOSPADM

## 2024-12-26 RX ORDER — ASPIRIN 81 MG/1
81 TABLET, CHEWABLE ORAL NIGHTLY
Status: DISCONTINUED | OUTPATIENT
Start: 2024-12-26 | End: 2024-12-30 | Stop reason: HOSPADM

## 2024-12-26 RX ORDER — ACETAMINOPHEN 325 MG/1
650 TABLET ORAL EVERY 6 HOURS PRN
Status: DISCONTINUED | OUTPATIENT
Start: 2024-12-26 | End: 2024-12-30 | Stop reason: HOSPADM

## 2024-12-26 RX ORDER — ACETAMINOPHEN 650 MG/1
650 SUPPOSITORY RECTAL EVERY 6 HOURS PRN
Status: DISCONTINUED | OUTPATIENT
Start: 2024-12-26 | End: 2024-12-30 | Stop reason: HOSPADM

## 2024-12-26 RX ORDER — FUROSEMIDE 20 MG/1
10 TABLET ORAL DAILY
Status: DISCONTINUED | OUTPATIENT
Start: 2024-12-26 | End: 2024-12-30 | Stop reason: HOSPADM

## 2024-12-26 RX ORDER — SODIUM CHLORIDE 9 MG/ML
INJECTION, SOLUTION INTRAVENOUS PRN
Status: DISCONTINUED | OUTPATIENT
Start: 2024-12-26 | End: 2024-12-30 | Stop reason: HOSPADM

## 2024-12-26 RX ORDER — CHLORAL HYDRATE 500 MG
1 CAPSULE ORAL 2 TIMES DAILY
Status: CANCELLED | OUTPATIENT
Start: 2024-12-26

## 2024-12-26 RX ORDER — VITAMIN B COMPLEX
2000 TABLET ORAL DAILY
Status: DISCONTINUED | OUTPATIENT
Start: 2024-12-26 | End: 2024-12-30 | Stop reason: HOSPADM

## 2024-12-26 RX ORDER — LOSARTAN POTASSIUM 25 MG/1
25 TABLET ORAL NIGHTLY
Status: DISCONTINUED | OUTPATIENT
Start: 2024-12-26 | End: 2024-12-30 | Stop reason: HOSPADM

## 2024-12-26 RX ORDER — SODIUM CHLORIDE 0.9 % (FLUSH) 0.9 %
5-40 SYRINGE (ML) INJECTION PRN
Status: DISCONTINUED | OUTPATIENT
Start: 2024-12-26 | End: 2024-12-30 | Stop reason: HOSPADM

## 2024-12-26 RX ORDER — IPRATROPIUM BROMIDE AND ALBUTEROL SULFATE 2.5; .5 MG/3ML; MG/3ML
1 SOLUTION RESPIRATORY (INHALATION) ONCE
Status: COMPLETED | OUTPATIENT
Start: 2024-12-26 | End: 2024-12-26

## 2024-12-26 RX ORDER — BUDESONIDE AND FORMOTEROL FUMARATE DIHYDRATE 160; 4.5 UG/1; UG/1
2 AEROSOL RESPIRATORY (INHALATION)
Status: DISCONTINUED | OUTPATIENT
Start: 2024-12-26 | End: 2024-12-30 | Stop reason: HOSPADM

## 2024-12-26 RX ORDER — ONDANSETRON 4 MG/1
4 TABLET, ORALLY DISINTEGRATING ORAL EVERY 8 HOURS PRN
Status: DISCONTINUED | OUTPATIENT
Start: 2024-12-26 | End: 2024-12-30 | Stop reason: HOSPADM

## 2024-12-26 RX ADMIN — LOSARTAN POTASSIUM 25 MG: 25 TABLET, FILM COATED ORAL at 21:29

## 2024-12-26 RX ADMIN — AZITHROMYCIN MONOHYDRATE 500 MG: 500 INJECTION, POWDER, LYOPHILIZED, FOR SOLUTION INTRAVENOUS at 18:23

## 2024-12-26 RX ADMIN — BUDESONIDE AND FORMOTEROL FUMARATE DIHYDRATE 2 PUFF: 160; 4.5 AEROSOL RESPIRATORY (INHALATION) at 20:54

## 2024-12-26 RX ADMIN — IPRATROPIUM BROMIDE AND ALBUTEROL SULFATE 1 DOSE: .5; 2.5 SOLUTION RESPIRATORY (INHALATION) at 17:34

## 2024-12-26 RX ADMIN — ASPIRIN 81 MG: 81 TABLET, CHEWABLE ORAL at 21:29

## 2024-12-26 RX ADMIN — METHYLPREDNISOLONE SODIUM SUCCINATE 125 MG: 125 INJECTION, POWDER, FOR SOLUTION INTRAMUSCULAR; INTRAVENOUS at 17:49

## 2024-12-26 RX ADMIN — WATER 1000 MG: 1 INJECTION INTRAMUSCULAR; INTRAVENOUS; SUBCUTANEOUS at 17:50

## 2024-12-26 RX ADMIN — SODIUM CHLORIDE, PRESERVATIVE FREE 10 ML: 5 INJECTION INTRAVENOUS at 21:06

## 2024-12-26 ASSESSMENT — PAIN - FUNCTIONAL ASSESSMENT: PAIN_FUNCTIONAL_ASSESSMENT: NONE - DENIES PAIN

## 2024-12-26 NOTE — ED NOTES
ED to inpatient nurses report      Chief Complaint:  Chief Complaint   Patient presents with    Cough    Shortness of Breath     Productive cough with brown sputum.  Shortness of breath.  Onset about 1 week ago.  Pt on home 02 for COPD.      Present to ED from: home w/ hx of cough, diff breathing, low grade temp 100F, runny nose x 1 week. Went to [facility] 1 week ago. Has oxygen to use prn but has been using on/off for last week     MOA:     LOC: alert and orientated to name, place, date  Mobility: Independent  Oxygen Baseline: uses O2 2L NC as needed but has been using on/off more in last week. Noted that home oxygen tubing was bent/cut and not delivering full oxygen; has been on O2 2L in ER    Current needs required: 2L NC - received aerosol Duoneb at 1734   Pending ED orders: none at this time; pending hold status for admission orders. Received Rocephin 1gm IVPB at 1749 and getting 2nd ATB Zithromax  Present condition: stable; improvement after aerosol, Solumedrol 125mg IVP at 1749    Why did the patient come to the ED? Difficulty breathing/SOB; cough, low grade temp  What is the plan? Admission for management of pneumonia Lt lung and hypoxia; currently on oxygen 2LNC continuous in ER.   Any procedures or intervention occur? Duoneb aerosol; Flu neg; COVID neg; Blood Cx drawn, CXR - pneumonia Left lung; Solumedrol 125mg IVP  Any safety concerns??none at this time; found home oxygen tubing was bent/crack threw it away; she thought her machine was not working but bad tubing - will need to ensure good tubing at DC  CODE STATUS Prior  Diet No diet orders on file    Mental Status:  Level of Consciousness: Alert (0)    Psych Assessment:   Psychosocial  Psychosocial (WDL):  (To [facility] from waiting room w/ hx of COPD, NL uses oxygen prn but having to use on/off last week. worsening over past week; seen [facility] 1 week ago. cough, runny nose, low grade temp)  Vital signs   Vitals:    12/26/24 1738 12/26/24 1753

## 2024-12-26 NOTE — ED NOTES
Ambulatory to restroom for void; gait steady; sl winded upon returning to room - went to BR w/out oxygen per pt.  Pox at 91% on return to room/cart. Call light w/in reach. Sipping water.

## 2024-12-26 NOTE — ED PROVIDER NOTES
[facility] EMERGENCY DEPARTMENT  eMERGENCY dEPARTMENT eNCOUnter   Independent Attestation     Pt Name: Irais Max  MRN: [id number]  Birthdate 1955  Date of evaluation: 12/26/24       Irais Max is a 69 y.o. female who presents with Cough and Shortness of Breath (Productive cough with brown sputum.  Shortness of breath.  Onset about 1 week ago.  Pt on home 02 for COPD. )        Based on the medical record, the care appears appropriate. I was personally available for consultation in the Emergency Department.    Booker Campuzano DO  Attending Emergency  Physician                To, Booker SANCHEZ DO  12/26/24 [id number]

## 2024-12-26 NOTE — ED NOTES
ED to inpatient nurses report      Chief Complaint:  Chief Complaint   Patient presents with   • Cough   • Shortness of Breath     Productive cough with brown sputum.  Shortness of breath.  Onset about 1 week ago.  Pt on home 02 for COPD.      Present to ED from: ***    MOA:     LOC: {loc:77225}  Mobility: {mobility:03133}  Oxygen Baseline: ***    Current needs required: ***   Pending ED orders: ***  Present condition: ***    Why did the patient come to the ED? ***  What is the plan? ***  Any procedures or intervention occur? ***  Any safety concerns??***  CODE STATUS Prior  Diet No diet orders on file    Mental Status:  Level of Consciousness: Alert (0)    Psych Assessment:   Psychosocial  Psychosocial (WDL):  (To [facility] from waiting room w/ hx of COPD, NL uses oxygen prn but having to use on/off last week. worsening over past week; seen [facility] 1 week ago. cough, runny nose, low grade temp)  Vital signs   Vitals:    12/26/24 1723 12/26/24 1735 12/26/24 1738 12/26/24 1753   BP: (!) 147/75      Pulse:   (!) 108 (!) 116   Resp:   21 22   Temp:       TempSrc:       SpO2: (!) 86% 95% 94% 94%   Weight:       Height:            Vitals:  Patient Vitals for the past 24 hrs:   BP Temp Temp src Pulse Resp SpO2 Height Weight   12/26/24 1753 -- -- -- (!) 116 22 94 % -- --   12/26/24 1738 -- -- -- (!) 108 21 94 % -- --   12/26/24 1735 -- -- -- -- -- 95 % -- --   12/26/24 1723 (!) 147/75 -- -- -- -- (!) 86 % -- --   12/26/24 1501 129/68 98.8 °F (37.1 °C) Oral (!) 107 20 (!) 85 % 1.524 m (5') 100 kg (220 lb 7.4 oz)      Visit Vitals  BP (!) 147/75   Pulse (!) 116   Temp 98.8 °F (37.1 °C) (Oral)   Resp 22   Ht 1.524 m (5')   Wt 100 kg (220 lb 7.4 oz)   SpO2 94%   BMI 43.06 kg/m²        LDAs:   Peripheral IV 12/26/24 Left Antecubital (Active)   Site Assessment Clean, dry & intact 12/26/24 1710   Line Status Brisk blood return 12/26/24 1710   Dressing Status Clean, dry & intact;New dressing applied 12/26/24 1710   Dressing  depressions.  No STEMI [AT]      ED Course User Index  [AT] To, Booker SANCHEZ DO        Electronically signed by Rosemarie Whitaker RN on 12/26/2024 at 6:14 PM

## 2024-12-26 NOTE — H&P
[facility] Residency  Inpatient Service     HISTORY AND PHYSICAL EXAMINATION            Date:   12/26/2024  Patient name:  Irais Max  Date of admission:  12/26/2024  2:57 PM  MRN:   [id number]  Account:  [id number]  YOB: 1955  PCP:    Carie Jones DO  Room:   [address] [place] [address]  Code Status:    Full Code  hPOA Name:   Wero Velazquez  Home Phone: [phone]  Work Phone: [phone]  Mobile Phone: [phone]    History Obtained From:     patient    History of Present Illness:     Irais Max is a 69 y.o. Non- / non  female who presents with Cough and Shortness of Breath (Productive cough with brown sputum.  Shortness of breath.  Onset about 1 week ago.  Pt on home 02 for COPD. )   and is admitted to the hospital for the management of Acute on chronic hypoxic respiratory failure.    Patient has a hx of COPD, T2DM, abdominal surgery for wound vac, abdominal hernia, HLD, HTN, CKD stage 2, secondary hyperparathyroidism, hx of DVT, sleep apnea (does not use cpap).   Patient has been wheezing and coughing for 1 week. The past 2 days she has had headache which is new for her.She has been cough up dark yellow brown sputum.  She has been busy dropping off food for friends for the holidays. She has 2 L oxygen at home that she doesn't use because tubing needs to be replaced. She doesn't use COPD medications/nebulizer because she is busy and forgets. Her pulmonologist is Dr. Crawford.     In the ED, pt presented hypertensive, tachycardic, and hypoxic (85%). Labs were remarkable for leukocytosis (15.7). COVID and influenza negative. EKG was unremarkable. CXR shows left perihilar pna. Blood cultures were collected. Pt was given azithromycin 500 mg, rocephin IV, solumedrol 125 mg, douneb nebulizer. Pt was admitted to  inpatient service for management of COPD exacerbation 2/2 pna.     Social History:     Social History     Socioeconomic  capacity on admission? yes  Based on limited initial assessment, is it anticipated that patient may have continued care needs at discharge? yes  Care after discharge was briefly discussed with patient, and at this time they anticipate the following needs at discharge: Home with no additional care needs       Patient is admitted as inpatient status because of co-morbidities listed above, severity of signs and symptoms as outlined, requirement for current medical therapies and most importantly because of direct risk to patient if care not provided in a hospital setting.  Expected length of stay > 48 hours. Patient is admitted in the Med/Surge    Patient was seen and discussed with DO Ck Perry MD  Family Medicine Resident, PGY-1  12/26/2024  7:07 PM    Senior Resident Attestation:    I have independently seen Irais Max and discussed the assessment and plan with the intern listed above and the attending Georgette Panda DO. I have reviewed the note and have included any edits or additional information above.     Claire Jose MD  Family Medicine Resident, PGY-3   12/26/2024  8:15 PM    Attending Physician Statement  I discussed the care of Iraisrenny Max  including pertinent history and exam findings, with the resident at the time of the encounter. I have reviewed the key elements of all parts of the encounter with the resident. I agree with the assessment, plan and orders as documented by the resident.    Georgette Panda DO   12/27/2024    Copy sent to Carie Fernandez DO

## 2024-12-27 LAB
ANION GAP SERPL CALCULATED.3IONS-SCNC: 11 MMOL/L (ref 9–17)
BASOPHILS # BLD: 0 K/UL (ref 0–0.2)
BASOPHILS NFR BLD: 0 % (ref 0–2)
BUN SERPL-MCNC: 15 MG/DL (ref 8–23)
CALCIUM SERPL-MCNC: 10.6 MG/DL (ref 8.6–10.4)
CHLORIDE SERPL-SCNC: 100 MMOL/L (ref 98–107)
CO2 SERPL-SCNC: 29 MMOL/L (ref 20–31)
CREAT SERPL-MCNC: 0.6 MG/DL (ref 0.5–0.9)
EOSINOPHIL # BLD: 0 K/UL (ref 0–0.4)
EOSINOPHILS RELATIVE PERCENT: 0 % (ref 1–4)
ERYTHROCYTE [DISTWIDTH] IN BLOOD BY AUTOMATED COUNT: 14.6 % (ref 12.5–15.4)
GFR, ESTIMATED: >90 ML/MIN/1.73M2
GLUCOSE BLD-MCNC: 152 MG/DL (ref 65–105)
GLUCOSE BLD-MCNC: 195 MG/DL (ref 65–105)
GLUCOSE BLD-MCNC: 219 MG/DL (ref 65–105)
GLUCOSE BLD-MCNC: 256 MG/DL (ref 65–105)
GLUCOSE SERPL-MCNC: 173 MG/DL (ref 70–99)
HCT VFR BLD AUTO: 42.5 % (ref 36–46)
HGB BLD-MCNC: 13.9 G/DL (ref 12–16)
LYMPHOCYTES NFR BLD: 0.68 K/UL (ref 1–4.8)
LYMPHOCYTES RELATIVE PERCENT: 4 % (ref 24–44)
MAGNESIUM SERPL-MCNC: 2.7 MG/DL (ref 1.6–2.6)
MCH RBC QN AUTO: 29.7 PG (ref 26–34)
MCHC RBC AUTO-ENTMCNC: 32.7 G/DL (ref 31–37)
MCV RBC AUTO: 90.9 FL (ref 80–100)
MONOCYTES NFR BLD: 1.2 K/UL (ref 0.1–0.8)
MONOCYTES NFR BLD: 7 % (ref 1–7)
MORPHOLOGY: NORMAL
NEUTROPHILS NFR BLD: 89 % (ref 36–66)
NEUTS SEG NFR BLD: 15.22 K/UL (ref 1.8–7.7)
PLATELET # BLD AUTO: 397 K/UL (ref 140–450)
PMV BLD AUTO: 8.4 FL (ref 6–12)
POTASSIUM SERPL-SCNC: 4.9 MMOL/L (ref 3.7–5.3)
RBC # BLD AUTO: 4.68 M/UL (ref 4–5.2)
SODIUM SERPL-SCNC: 140 MMOL/L (ref 135–144)
WBC OTHER # BLD: 17.1 K/UL (ref 3.5–11)

## 2024-12-27 PROCEDURE — 2580000003 HC RX 258

## 2024-12-27 PROCEDURE — 6360000002 HC RX W HCPCS

## 2024-12-27 PROCEDURE — 82947 ASSAY GLUCOSE BLOOD QUANT: CPT

## 2024-12-27 PROCEDURE — 2500000003 HC RX 250 WO HCPCS

## 2024-12-27 PROCEDURE — 85025 COMPLETE CBC W/AUTO DIFF WBC: CPT

## 2024-12-27 PROCEDURE — 97535 SELF CARE MNGMENT TRAINING: CPT

## 2024-12-27 PROCEDURE — 1200000000 HC SEMI PRIVATE

## 2024-12-27 PROCEDURE — 6370000000 HC RX 637 (ALT 250 FOR IP)

## 2024-12-27 PROCEDURE — 2700000000 HC OXYGEN THERAPY PER DAY

## 2024-12-27 PROCEDURE — 83735 ASSAY OF MAGNESIUM: CPT

## 2024-12-27 PROCEDURE — 97116 GAIT TRAINING THERAPY: CPT

## 2024-12-27 PROCEDURE — 97162 PT EVAL MOD COMPLEX 30 MIN: CPT

## 2024-12-27 PROCEDURE — 97166 OT EVAL MOD COMPLEX 45 MIN: CPT

## 2024-12-27 PROCEDURE — 94761 N-INVAS EAR/PLS OXIMETRY MLT: CPT

## 2024-12-27 PROCEDURE — 36415 COLL VENOUS BLD VENIPUNCTURE: CPT

## 2024-12-27 PROCEDURE — 80048 BASIC METABOLIC PNL TOTAL CA: CPT

## 2024-12-27 PROCEDURE — 94640 AIRWAY INHALATION TREATMENT: CPT

## 2024-12-27 PROCEDURE — 99222 1ST HOSP IP/OBS MODERATE 55: CPT | Performed by: FAMILY MEDICINE

## 2024-12-27 RX ORDER — GLUCAGON 1 MG/ML
1 KIT INJECTION PRN
Status: DISCONTINUED | OUTPATIENT
Start: 2024-12-27 | End: 2024-12-30 | Stop reason: HOSPADM

## 2024-12-27 RX ORDER — INSULIN LISPRO 100 [IU]/ML
0-4 INJECTION, SOLUTION INTRAVENOUS; SUBCUTANEOUS
Status: DISCONTINUED | OUTPATIENT
Start: 2024-12-27 | End: 2024-12-30 | Stop reason: HOSPADM

## 2024-12-27 RX ORDER — DEXTROSE MONOHYDRATE 100 MG/ML
INJECTION, SOLUTION INTRAVENOUS CONTINUOUS PRN
Status: DISCONTINUED | OUTPATIENT
Start: 2024-12-27 | End: 2024-12-30 | Stop reason: HOSPADM

## 2024-12-27 RX ADMIN — Medication 500 MG: at 09:21

## 2024-12-27 RX ADMIN — EMPAGLIFLOZIN 25 MG: 10 TABLET, FILM COATED ORAL at 09:19

## 2024-12-27 RX ADMIN — SODIUM CHLORIDE, PRESERVATIVE FREE 10 ML: 5 INJECTION INTRAVENOUS at 20:51

## 2024-12-27 RX ADMIN — ENOXAPARIN SODIUM 40 MG: 100 INJECTION SUBCUTANEOUS at 09:32

## 2024-12-27 RX ADMIN — TIOTROPIUM BROMIDE INHALATION SPRAY 1 PUFF: 3.12 SPRAY, METERED RESPIRATORY (INHALATION) at 09:00

## 2024-12-27 RX ADMIN — BUDESONIDE AND FORMOTEROL FUMARATE DIHYDRATE 2 PUFF: 160; 4.5 AEROSOL RESPIRATORY (INHALATION) at 09:00

## 2024-12-27 RX ADMIN — GUAIFENESIN 600 MG: 600 TABLET, MULTILAYER, EXTENDED RELEASE ORAL at 09:18

## 2024-12-27 RX ADMIN — AZITHROMYCIN MONOHYDRATE 500 MG: 500 INJECTION, POWDER, LYOPHILIZED, FOR SOLUTION INTRAVENOUS at 20:48

## 2024-12-27 RX ADMIN — WATER 1000 MG: 1 INJECTION INTRAMUSCULAR; INTRAVENOUS; SUBCUTANEOUS at 20:43

## 2024-12-27 RX ADMIN — ASPIRIN 81 MG: 81 TABLET, CHEWABLE ORAL at 20:49

## 2024-12-27 RX ADMIN — METHYLPREDNISOLONE SODIUM SUCCINATE 40 MG: 40 INJECTION, POWDER, FOR SOLUTION INTRAMUSCULAR; INTRAVENOUS at 09:24

## 2024-12-27 RX ADMIN — LOSARTAN POTASSIUM 25 MG: 25 TABLET, FILM COATED ORAL at 20:49

## 2024-12-27 RX ADMIN — INSULIN LISPRO 1 UNITS: 100 INJECTION, SOLUTION INTRAVENOUS; SUBCUTANEOUS at 19:55

## 2024-12-27 RX ADMIN — Medication 2000 UNITS: at 09:18

## 2024-12-27 RX ADMIN — GUAIFENESIN 600 MG: 600 TABLET, MULTILAYER, EXTENDED RELEASE ORAL at 20:49

## 2024-12-27 RX ADMIN — FUROSEMIDE 10 MG: 20 TABLET ORAL at 09:21

## 2024-12-27 RX ADMIN — SODIUM CHLORIDE, PRESERVATIVE FREE 10 ML: 5 INJECTION INTRAVENOUS at 09:23

## 2024-12-27 NOTE — CARE COORDINATION
Case Management Assessment  Initial Evaluation    Date/Time of Evaluation: 12/27/2024 12:58 PM  Assessment Completed by: Katirn Smith RN    If patient is discharged prior to next notation, then this note serves as note for discharge by case management.    Patient Name: Irais Max                   YOB: 1955  Diagnosis: Hypoxia [R09.02]  Pneumonia of left lung due to infectious organism, unspecified part of lung [J18.9]  Chronic obstructive pulmonary disease with pneumonia (HCC) [J44.0, J18.9]                   Date / Time: 12/26/2024  2:57 PM    Patient Admission Status: Inpatient   Readmission Risk (Low < 19, Mod (19-27), High > 27): Readmission Risk Score: 12.4    Current PCP: Carie Jones, DO  PCP verified by CM? Yes    Chart Reviewed: Yes      History Provided by: Patient  Patient Orientation: Alert and Oriented    Patient Cognition: Alert    Hospitalization in the last 30 days (Readmission):  No    If yes, Readmission Assessment in CM Navigator will be completed.    Advance Directives:      Code Status: Full Code   Patient's Primary Decision Maker is: Legal Next of Kin      Discharge Planning:    Patient lives with: Alone Type of Home: Apartment  Primary Care Giver: Self  Patient Support Systems include: Family Members, Friends/Neighbors   Current Financial resources: Medicare, Medicaid  Current community resources: None  Current services prior to admission: C-pap            Current DME:              Type of Home Care services:  None    ADLS  Prior functional level: Independent in ADLs/IADLs  Current functional level: Assistance with the following:, Other (see comment) (Await PT/OT eval)    PT AM-PAC:   /24  OT AM-PAC:   /24    Family can provide assistance at DC: No  Would you like Case Management to discuss the discharge plan with any other family members/significant others, and if so, who? No  Plans to Return to Present Housing: Unknown at present  Other Identified Issues/Barriers  supports the patient's individualized plan of care/goals and shares the quality data associated with the providers was provided to: Patient   Patient Representative Name:       The Patient and/or Patient Representative Agree with the Discharge Plan? Yes    Katrin Smith RN  Case Management Department

## 2024-12-27 NOTE — PROGRESS NOTES
Physical Therapy  Facility/Department: [address]  Physical Therapy Initial Assessment    Name: Irais Max  : 1955  MRN: [id number]  Date of Service: 2024  Chief Complaint   Patient presents with    Cough    Shortness of Breath     Productive cough with brown sputum.  Shortness of breath.  Onset about 1 week ago.  Pt on home 02 for COPD.         Discharge Recommendations:  Continue to assess pending progress   PT Equipment Recommendations  Equipment Needed: No      Patient Diagnosis(es): The primary encounter diagnosis was Pneumonia of left lung due to infectious organism, unspecified part of lung. A diagnosis of Hypoxia was also pertinent to this visit.  Past Medical History:  has a past medical history of Abdominal pain, Anxiety, Asthma, COPD (chronic obstructive pulmonary disease) ([facility]), COVID-19 vaccine series completed, Diabetes mellitus ([facility]), Full dentures, Goiter, Hyperlipidemia, Hyperparathyroidism ([facility]), Hypertension, Lives alone, Obesity, Oxygen dependent, Recurrent ventral hernia, Sleep apnea, Under care of team, Under care of team, Wears glasses, and Wellness examination.  Past Surgical History:  has a past surgical history that includes Hysterectomy ();  section (); ventral hernia repair; Tubal ligation (); hernia repair (N/A, 2024); Exploratory laparotomy w/ bowel resection (2024); laparotomy (N/A, 3/6/2024); and vascular surgery (Right, 3/12/2024).    Assessment  Body Structures, Functions, Activity Limitations Requiring Skilled Therapeutic Intervention: Decreased functional mobility ;Decreased endurance;Decreased safe awareness  Assessment: Pt presents with generalized weakness and decreased activity tolerance due to admission for Acute on chronic respiratory failure. Pt resides alone in one story apartment with level entry; pt Independent with mobility without device at baseline and uses 2 L O2 as needed. Pt currently on 3 L O2 and SBA for transfers  and ambulated ~60' SBA without device; PO2 decreased to 83% following ambulation with several minutes to recover to the 90's. Pt would benefit from acute care therapy during admission to work on overall endurance and pacing. Pt should be able to return to previous living arrangement upon discharge and may benefit from further therapy.  Treatment Diagnosis: decreased activity tolerance  Therapy Prognosis: Good  Decision Making: Medium Complexity  Requires PT Follow-Up: Yes  Activity Tolerance  Activity Tolerance: Patient limited by endurance    Plan  Physical Therapy Plan  General Plan: 3-5 times per week  Current Treatment Recommendations: Endurance training, Gait training, Safety education & training, Patient/Caregiver education & training, Therapeutic activities  Safety Devices  Type of Devices: Gait belt, Left in bed, Call light within reach  Restraints  Restraints Initially in Place: No    Restrictions  Restrictions/Precautions  Activity Level: Up as Tolerated  Required Braces or Orthoses?: No  Position Activity Restriction  Other Position/Activity Restrictions: O2 at 3 L     Subjective  General  Patient assessed for rehabilitation services?: Yes  Family/Caregiver Present: No  Referring Practitioner: Mir  Referral Date : 12/26/24  Diagnosis: Acute on chronic respiratory failure  Follows Commands: Within Functional Limits  Subjective  Subjective: Pt sidelying in bed and RN and pt agreeable to therapy. Pt denies pain at this time. Pt currently on 3 L O2, uses 2 L at baseline prn.         Social/Functional History  Social/Functional History  Lives With: Alone  Type of Home: Apartment  Home Layout: One level  Home Access: Level entry  Bathroom Shower/Tub: Tub/Shower unit  Bathroom Toilet: Standard  Bathroom Equipment: Grab bars in shower, Shower chair  Bathroom Accessibility: Accessible  Home Equipment: Walker - Rolling  Has the patient had two or more falls in the past year or any fall with injury in the past

## 2024-12-27 NOTE — ED NOTES
Pt increased oxygen usage to 3L NC - pox 88-89% pt had lowered HOB but explained needed to increase back slightly. Pt stating she can not sleep well on the cart; reassurance/support provided.

## 2024-12-27 NOTE — ED PROVIDER NOTES
[address]  EMERGENCY DEPARTMENT ENCOUNTER      Pt Name: Irais Max  MRN: [id number]  Birthdate 1955  Date of evaluation: 12/26/2024  Provider: GODWIN Orellana CNP  8:45 PM    CHIEF COMPLAINT       Chief Complaint   Patient presents with    Cough    Shortness of Breath     Productive cough with brown sputum.  Shortness of breath.  Onset about 1 week ago.  Pt on home 02 for COPD.          HISTORY OF PRESENT ILLNESS    Irais Max is a 69 y.o. female who presents to the emergency department for evaluation of cough shortness of breath and not feeling well for the past week.  Patient is a history of COPD.  She does have oxygen to use at home and she uses it whenever she feels like.  She also has a history of sleep apnea but does not use her CPAP.  On arrival, she is hypoxic, 85%.  She reports sputum is discolored, she denies any chest pain no abdominal pain no nausea no vomiting currently.  She is very vague with her symptoms    HPI    Nursing Notes were reviewed.    REVIEW OF SYSTEMS       Review of Systems   All other systems reviewed and are negative.      Except as noted above the remainder of the review of systems was reviewed and negative.       PAST MEDICAL HISTORY     Past Medical History:   Diagnosis Date    Abdominal pain 02/28/2024    with feve and malaise , CT scan done and Dr. Purdy recommended immediate surgery but pt refused, see notes    Anxiety     Asthma     COPD (chronic obstructive pulmonary disease) (HCC)     COVID-19 vaccine series completed     Diabetes mellitus (HCC)     Full dentures     upper and lower, does not want anyone to see her without her dentures in    Goiter     Hyperlipidemia     Hyperparathyroidism (HCC)     Hypertension     Lives alone     Obesity     Oxygen dependent     2L / NC prn    Recurrent ventral hernia 11/2023    Sleep apnea     CPAP    Under care of team     Pulmonologist,   Dr. Crawford, last seen 10/31/2023, clearance on chart, high risk    Under  physician    RADIOLOGY:   Non-plain film images such as CT, Ultrasound and MRI are read by the radiologist. Plain radiographic images are visualized and preliminarily interpreted by the emergency physician with the below findings:        Interpretation per the Radiologist below, if available at the time of this note:    XR CHEST (2 VW)   Final Result   Left parahilar pneumonia               ED BEDSIDE ULTRASOUND:   Performed by ED Physician - none    LABS:  Labs Reviewed   CBC WITH AUTO DIFFERENTIAL - Abnormal; Notable for the following components:       Result Value    WBC 15.7 (*)     Neutrophils % 75 (*)     Lymphocytes % 15 (*)     Neutrophils Absolute 11.77 (*)     Monocytes Absolute 0.94 (*)     Eosinophils Absolute 0.47 (*)     All other components within normal limits   BASIC METABOLIC PANEL - Abnormal; Notable for the following components:    Chloride 96 (*)     Glucose 122 (*)     All other components within normal limits   COVID-19, RAPID   RAPID INFLUENZA A/B ANTIGENS   CULTURE, BLOOD 2   CULTURE, BLOOD 1   LACTIC ACID   TROPONIN   MAGNESIUM   CBC WITH AUTO DIFFERENTIAL   BASIC METABOLIC PANEL       All other labs were within normal range or not returned as of this dictation.    EMERGENCY DEPARTMENT COURSE and DIFFERENTIAL DIAGNOSIS/MDM:   Vitals:    Vitals:    12/26/24 1910 12/26/24 1915 12/26/24 1920 12/26/24 2015   BP:    (!) 143/61   Pulse: (!) 104 (!) 106 (!) 103 (!) 101   Resp: 29 (!) 36 (!) 33 24   Temp:    99 °F (37.2 °C)   TempSrc:    Oral   SpO2: (!) 88% (!) 88% 92% 91%   Weight:       Height:               Medical Decision Making  Amount and/or Complexity of Data Reviewed  Labs: ordered.  ECG/medicine tests: ordered.    Risk  Prescription drug management.  Decision regarding hospitalization.        Patient presents for evaluation of complaint listed above.  She is hypoxic despite oxygen therapy.  She is noted to have pneumonia, left perihilar.  Blood cultures lactic ordered.  White blood cell

## 2024-12-27 NOTE — PLAN OF CARE
Problem: Respiratory - Adult  Goal: Achieves optimal ventilation and oxygenation  Outcome: Progressing  Flowsheets (Taken 12/26/2024 2111)  Achieves optimal ventilation and oxygenation:   Assess for changes in respiratory status   Position to facilitate oxygenation and minimize respiratory effort   Respiratory therapy support as indicated   Oxygen supplementation based on oxygen saturation or arterial blood gases   Encourage broncho-pulmonary hygiene including cough, deep breathe, incentive spirometry   Assess and instruct to report shortness of breath or any respiratory difficulty

## 2024-12-27 NOTE — PROGRESS NOTES
[facility] Residency  Inpatient Service     Progress Note  2024    8:38 AM    Name:   Irais Max  MRN:     [id number]     Acct:      [id number]   Room:   [address] [place] [address] Day:  1  Admit Date:  2024  2:57 PM    PCP:   Carie Jones DO  Code Status:  Full Code    Subjective:     Overnight events: no overnight events     Pt was examined at bedside this morning. Pt's breathing improved, pt said she is coughing less and breathing better. She still has a small amount of wheezing. She said she was sleeping well except had to use the restroom and could not make it in time. She denies chest pain, palpitations, coughing, trouble breathing.     Medications:     Allergies:    Allergies   Allergen Reactions    Levofloxacin Diarrhea    Lisinopril Cough       Current Meds:   Scheduled Meds:    sodium chloride flush  5-40 mL IntraVENous 2 times per day    enoxaparin  40 mg SubCUTAneous Daily    methylPREDNISolone  40 mg IntraVENous Daily    aspirin  81 mg Oral Nightly    empagliflozin  25 mg Oral Daily    losartan  25 mg Oral Nightly    furosemide  10 mg Oral Daily    ascorbic acid  500 mg Oral Daily    Vitamin D  2,000 Units Oral Daily    budesonide-formoterol  2 puff Inhalation BID RT    guaiFENesin  600 mg Oral BID    tiotropium  1 puff Inhalation Daily RT    azithromycin  500 mg IntraVENous Daily    cefTRIAXone (ROCEPHIN) IV  1,000 mg IntraVENous Daily     Continuous Infusions:    sodium chloride       PRN Meds: sodium chloride flush, sodium chloride, ondansetron **OR** ondansetron, polyethylene glycol, acetaminophen **OR** acetaminophen, albuterol    ROS:   ROS is negative except for points noted above.    Data:     Vitals:  BP (!) 158/70   Pulse 98   Temp 99 °F (37.2 °C) (Oral)   Resp 18   Ht 1.524 m (5')   Wt 100 kg (220 lb 7.4 oz)   SpO2 91%   BMI 43.06 kg/m²   Temp (24hrs), Av.9 °F (37.2 °C), Min:98.8 °F (37.1 °C), Max:99 °F (37.2

## 2024-12-27 NOTE — PROGRESS NOTES
Occupational Therapy  Occupational Therapy Initial Evaluation  Facility/Department: [address]   Patient Name: Irais Max        MRN: [id number]    : 1955    Date of Service: 2024    Chief Complaint   Patient presents with    Cough    Shortness of Breath     Productive cough with brown sputum.  Shortness of breath.  Onset about 1 week ago.  Pt on home 02 for COPD.      Past Medical History:  has a past medical history of Abdominal pain, Anxiety, Asthma, COPD (chronic obstructive pulmonary disease) ([facility]), COVID-19 vaccine series completed, Diabetes mellitus ([facility]), Full dentures, Goiter, Hyperlipidemia, Hyperparathyroidism ([facility]), Hypertension, Lives alone, Obesity, Oxygen dependent, Recurrent ventral hernia, Sleep apnea, Under care of team, Under care of team, Wears glasses, and Wellness examination.  Past Surgical History:  has a past surgical history that includes Hysterectomy ();  section (); ventral hernia repair; Tubal ligation (); hernia repair (N/A, 2024); Exploratory laparotomy w/ bowel resection (2024); laparotomy (N/A, 3/6/2024); and vascular surgery (Right, 3/12/2024).    Discharge Recommendations  Discharge Recommendations: Patient would benefit from continued therapy after discharge    Assessment  Performance deficits / Impairments: Decreased functional mobility ;Decreased ADL status;Decreased cognition;Decreased safe awareness;Decreased strength;Decreased balance;Decreased endurance  Assessment: Pt presents with decreased ADL status secondary to above noted deficits. Pt to benefit from continued therapy services while hospitalized to maximize pt's safety and independence in performing functional tasks. At this time, pt has not demonstrated the functional ability to safely return to prior living arrangements, continued skilled OT intervention recommended prior to an eventual return to home.  Prognosis: Good  Decision Making: Medium Complexity  REQUIRES OT

## 2024-12-27 NOTE — PLAN OF CARE
Problem: Chronic Conditions and Co-morbidities  Goal: Patient's chronic conditions and co-morbidity symptoms are monitored and maintained or improved  Outcome: Progressing     Problem: Discharge Planning  Goal: Discharge to home or other facility with appropriate resources  Outcome: Progressing     Problem: ABCDS Injury Assessment  Goal: Absence of physical injury  Outcome: Progressing     Problem: Respiratory - Adult  Goal: Achieves optimal ventilation and oxygenation  Outcome: Progressing     Problem: Skin/Tissue Integrity  Goal: Absence of new skin breakdown  Description: 1.  Monitor for areas of redness and/or skin breakdown  2.  Assess vascular access sites hourly  3.  Every 4-6 hours minimum:  Change oxygen saturation probe site  4.  Every 4-6 hours:  If on nasal continuous positive airway pressure, respiratory therapy assess nares and determine need for appliance change or resting period.  Outcome: Progressing

## 2024-12-27 NOTE — PROGRESS NOTES
Spiritual Health History and Assessment/Progress Note  [facility]    (P) Initial Encounter,  ,  ,      Name: Irais Max MRN: [id number]    Age: 69 y.o.     Sex: female   Language: English   Spiritism: Judaism   Acute on chronic hypoxic respiratory failure     Date: 12/27/2024            Total Time Calculated: (P) 19 min              Spiritual Assessment began in [address]        Referral/Consult From: (P) Rounding   Encounter Overview/Reason: (P) Initial Encounter  Service Provided For: (P) Patient    PT feeling overwhelmed with taking care of her sons.  According to PT they are all adults but they rely on her too much.  She refers to herself as their enabler.  She is feeling tired and overwhelmed.  She vented for awhile, then  prayed with her.   left her to rest in peace and quiet.    Caitlin, Belief, Meaning:   Patient identifies as spiritual, is connected with a caitlin tradition or spiritual practice, and has beliefs or practices that help with coping during difficult times  Family/Friends No family/friends present      Importance and Influence:  Patient has spiritual/personal beliefs that influence decisions regarding their health  Family/Friends No family/friends present    Community:  Patient is connected with a spiritual community and expresses feelings of isolation: feeling there is no one to turn to for help  Family/Friends No family/friends present    Assessment and Plan of Care:     Patient Interventions include: Facilitated expression of thoughts and feelings  Family/Friends Interventions include: No family/friends present    Patient Plan of Care: Spiritual Care available upon further referral  Family/Friends Plan of Care: No family/friends present    Electronically signed by Chaplain Nacho Intern on 12/27/2024 at 12:19 PM

## 2024-12-27 NOTE — PROGRESS NOTES
Physician Progress Note      PATIENT:               DIANE NAVA  CSN #:                  [id number]  :                       1955  ADMIT DATE:       2024 2:57 PM  DISCH DATE:  RESPONDING  PROVIDER #:        PATTI HUNTER          QUERY TEXT:    Pt admitted with COPD Exacerbation, PNA Acute/Chronic hypoxic respiratory   failure Pt noted to have WBC 15.1 >17.1, O2 2-3 L NC sats 85-95%, RR 16-36,   temps 99, HR , B/P's 128/68, 147/75. If possible, please document in the   progress notes and discharge summary if you are evaluating and /or treating   any of the following:  The medical record reflects the following:  Risk Factors: Age 69 PNA Hypoxic respiratory failure, COPD Exacerbation   noncompliant iw CPAP former smoker  Clinical Indicators: WBC 15.1 >17.1, O2 2-3 L NC sats 85-95%, RR 16-36, temps   99, HR , B/P's 128/68, 147/75.H/P Acute on chronic hypoxic respiratory   failure Chronic obstructive pulmonary disease with pneumonia Obstructive sleep   apnea syndrome Noncompliance with CPAP Former smoker Home 2L O2. CXR Left   parahilar pneumonia    Treatment: IV Rocephin/Azithromycin, CXR, Labs, Monitor    Any questions  Please Call  Alma Lopez RN,BSN,[facility]-,-230pm  [phone]  [email or web address]  Options provided:  -- Sepsis, present on admission  -- PNA without Sepsis  -- Other - I will add my own diagnosis  -- Disagree - Not applicable / Not valid  -- Disagree - Clinically unable to determine / Unknown  -- Refer to Clinical Documentation Reviewer    PROVIDER RESPONSE TEXT:    This patient has PNA without Sepsis.    Query created by: Alma Lopez on 2024 8:18 AM      Electronically signed by:  PATTI HNUTER 2024 8:24 AM

## 2024-12-28 LAB
ANION GAP SERPL CALCULATED.3IONS-SCNC: 10 MMOL/L (ref 9–17)
B PARAP IS1001 DNA NPH QL NAA+NON-PROBE: NOT DETECTED
B PERT DNA SPEC QL NAA+PROBE: NOT DETECTED
BASOPHILS # BLD: 0.2 K/UL (ref 0–0.2)
BASOPHILS NFR BLD: 1 % (ref 0–2)
BUN SERPL-MCNC: 18 MG/DL (ref 8–23)
C PNEUM DNA NPH QL NAA+NON-PROBE: NOT DETECTED
CALCIUM SERPL-MCNC: 11.2 MG/DL (ref 8.6–10.4)
CHLORIDE SERPL-SCNC: 98 MMOL/L (ref 98–107)
CO2 SERPL-SCNC: 32 MMOL/L (ref 20–31)
CREAT SERPL-MCNC: 0.5 MG/DL (ref 0.5–0.9)
EOSINOPHIL # BLD: 0 K/UL (ref 0–0.4)
EOSINOPHILS RELATIVE PERCENT: 0 % (ref 1–4)
ERYTHROCYTE [DISTWIDTH] IN BLOOD BY AUTOMATED COUNT: 14.8 % (ref 12.5–15.4)
FLUAV RNA NPH QL NAA+NON-PROBE: NOT DETECTED
FLUBV RNA NPH QL NAA+NON-PROBE: NOT DETECTED
GFR, ESTIMATED: >90 ML/MIN/1.73M2
GLUCOSE BLD-MCNC: 128 MG/DL (ref 65–105)
GLUCOSE BLD-MCNC: 162 MG/DL (ref 65–105)
GLUCOSE BLD-MCNC: 171 MG/DL (ref 65–105)
GLUCOSE BLD-MCNC: 183 MG/DL (ref 65–105)
GLUCOSE SERPL-MCNC: 124 MG/DL (ref 70–99)
HADV DNA NPH QL NAA+NON-PROBE: NOT DETECTED
HCOV 229E RNA NPH QL NAA+NON-PROBE: NOT DETECTED
HCOV HKU1 RNA NPH QL NAA+NON-PROBE: NOT DETECTED
HCOV NL63 RNA NPH QL NAA+NON-PROBE: NOT DETECTED
HCOV OC43 RNA NPH QL NAA+NON-PROBE: NOT DETECTED
HCT VFR BLD AUTO: 44.2 % (ref 36–46)
HGB BLD-MCNC: 14.2 G/DL (ref 12–16)
HMPV RNA NPH QL NAA+NON-PROBE: NOT DETECTED
HPIV1 RNA NPH QL NAA+NON-PROBE: NOT DETECTED
HPIV2 RNA NPH QL NAA+NON-PROBE: NOT DETECTED
HPIV3 RNA NPH QL NAA+NON-PROBE: NOT DETECTED
HPIV4 RNA NPH QL NAA+NON-PROBE: NOT DETECTED
LYMPHOCYTES NFR BLD: 1.8 K/UL (ref 1–4.8)
LYMPHOCYTES RELATIVE PERCENT: 9 % (ref 24–44)
M PNEUMO DNA NPH QL NAA+NON-PROBE: NOT DETECTED
MAGNESIUM SERPL-MCNC: 2.5 MG/DL (ref 1.6–2.6)
MCH RBC QN AUTO: 29.1 PG (ref 26–34)
MCHC RBC AUTO-ENTMCNC: 32 G/DL (ref 31–37)
MCV RBC AUTO: 90.9 FL (ref 80–100)
MONOCYTES NFR BLD: 1.8 K/UL (ref 0.1–1.2)
MONOCYTES NFR BLD: 9 % (ref 2–11)
MORPHOLOGY: NORMAL
NEUTROPHILS NFR BLD: 81 % (ref 36–66)
NEUTS SEG NFR BLD: 16.2 K/UL (ref 1.8–7.7)
PLATELET # BLD AUTO: 453 K/UL (ref 140–450)
PMV BLD AUTO: 8.6 FL (ref 6–12)
POTASSIUM SERPL-SCNC: 4.5 MMOL/L (ref 3.7–5.3)
RBC # BLD AUTO: 4.87 M/UL (ref 4–5.2)
RSV RNA NPH QL NAA+NON-PROBE: NOT DETECTED
RV+EV RNA NPH QL NAA+NON-PROBE: NOT DETECTED
S PNEUM AG SPEC QL: NEGATIVE
SARS-COV-2 RNA NPH QL NAA+NON-PROBE: NOT DETECTED
SODIUM SERPL-SCNC: 140 MMOL/L (ref 135–144)
SPECIMEN DESCRIPTION: NORMAL
SPECIMEN SOURCE: NORMAL
WBC OTHER # BLD: 20 K/UL (ref 3.5–11)

## 2024-12-28 PROCEDURE — 94761 N-INVAS EAR/PLS OXIMETRY MLT: CPT

## 2024-12-28 PROCEDURE — 94640 AIRWAY INHALATION TREATMENT: CPT

## 2024-12-28 PROCEDURE — 80048 BASIC METABOLIC PNL TOTAL CA: CPT

## 2024-12-28 PROCEDURE — 82947 ASSAY GLUCOSE BLOOD QUANT: CPT

## 2024-12-28 PROCEDURE — 6360000002 HC RX W HCPCS

## 2024-12-28 PROCEDURE — 36415 COLL VENOUS BLD VENIPUNCTURE: CPT

## 2024-12-28 PROCEDURE — 6370000000 HC RX 637 (ALT 250 FOR IP): Performed by: NURSE PRACTITIONER

## 2024-12-28 PROCEDURE — 85025 COMPLETE CBC W/AUTO DIFF WBC: CPT

## 2024-12-28 PROCEDURE — 6370000000 HC RX 637 (ALT 250 FOR IP)

## 2024-12-28 PROCEDURE — 6360000002 HC RX W HCPCS: Performed by: NURSE PRACTITIONER

## 2024-12-28 PROCEDURE — 99232 SBSQ HOSP IP/OBS MODERATE 35: CPT | Performed by: FAMILY MEDICINE

## 2024-12-28 PROCEDURE — 0202U NFCT DS 22 TRGT SARS-COV-2: CPT

## 2024-12-28 PROCEDURE — 2500000003 HC RX 250 WO HCPCS

## 2024-12-28 PROCEDURE — 87070 CULTURE OTHR SPECIMN AEROBIC: CPT

## 2024-12-28 PROCEDURE — 87205 SMEAR GRAM STAIN: CPT

## 2024-12-28 PROCEDURE — 2580000003 HC RX 258

## 2024-12-28 PROCEDURE — 1200000000 HC SEMI PRIVATE

## 2024-12-28 PROCEDURE — 83735 ASSAY OF MAGNESIUM: CPT

## 2024-12-28 PROCEDURE — 2700000000 HC OXYGEN THERAPY PER DAY

## 2024-12-28 PROCEDURE — 87899 AGENT NOS ASSAY W/OPTIC: CPT

## 2024-12-28 RX ORDER — DEXTROMETHORPHAN POLISTIREX 30 MG/5ML
60 SUSPENSION ORAL EVERY 12 HOURS SCHEDULED
Status: DISCONTINUED | OUTPATIENT
Start: 2024-12-28 | End: 2024-12-30 | Stop reason: HOSPADM

## 2024-12-28 RX ORDER — GUAIFENESIN 600 MG/1
1200 TABLET, EXTENDED RELEASE ORAL 2 TIMES DAILY
Status: DISCONTINUED | OUTPATIENT
Start: 2024-12-28 | End: 2024-12-30 | Stop reason: HOSPADM

## 2024-12-28 RX ORDER — ALBUTEROL SULFATE 0.83 MG/ML
2.5 SOLUTION RESPIRATORY (INHALATION)
Status: DISCONTINUED | OUTPATIENT
Start: 2024-12-28 | End: 2024-12-30 | Stop reason: HOSPADM

## 2024-12-28 RX ADMIN — DEXTROMETHORPHAN 60 MG: 30 SUSPENSION, EXTENDED RELEASE ORAL at 10:18

## 2024-12-28 RX ADMIN — WATER 1000 MG: 1 INJECTION INTRAMUSCULAR; INTRAVENOUS; SUBCUTANEOUS at 18:01

## 2024-12-28 RX ADMIN — FUROSEMIDE 10 MG: 20 TABLET ORAL at 08:34

## 2024-12-28 RX ADMIN — Medication 500 MG: at 08:34

## 2024-12-28 RX ADMIN — GUAIFENESIN 1200 MG: 600 TABLET, MULTILAYER, EXTENDED RELEASE ORAL at 21:23

## 2024-12-28 RX ADMIN — ENOXAPARIN SODIUM 40 MG: 100 INJECTION SUBCUTANEOUS at 08:35

## 2024-12-28 RX ADMIN — BUDESONIDE AND FORMOTEROL FUMARATE DIHYDRATE 2 PUFF: 160; 4.5 AEROSOL RESPIRATORY (INHALATION) at 19:33

## 2024-12-28 RX ADMIN — TIOTROPIUM BROMIDE INHALATION SPRAY 1 PUFF: 3.12 SPRAY, METERED RESPIRATORY (INHALATION) at 09:47

## 2024-12-28 RX ADMIN — SODIUM CHLORIDE, PRESERVATIVE FREE 10 ML: 5 INJECTION INTRAVENOUS at 08:40

## 2024-12-28 RX ADMIN — EMPAGLIFLOZIN 25 MG: 10 TABLET, FILM COATED ORAL at 08:34

## 2024-12-28 RX ADMIN — AZITHROMYCIN MONOHYDRATE 500 MG: 500 INJECTION, POWDER, LYOPHILIZED, FOR SOLUTION INTRAVENOUS at 18:19

## 2024-12-28 RX ADMIN — INSULIN LISPRO 1 UNITS: 100 INJECTION, SOLUTION INTRAVENOUS; SUBCUTANEOUS at 21:31

## 2024-12-28 RX ADMIN — ALBUTEROL SULFATE 2.5 MG: 2.5 SOLUTION RESPIRATORY (INHALATION) at 19:33

## 2024-12-28 RX ADMIN — METHYLPREDNISOLONE SODIUM SUCCINATE 40 MG: 40 INJECTION, POWDER, FOR SOLUTION INTRAMUSCULAR; INTRAVENOUS at 21:22

## 2024-12-28 RX ADMIN — ALBUTEROL SULFATE 2.5 MG: 2.5 SOLUTION RESPIRATORY (INHALATION) at 15:00

## 2024-12-28 RX ADMIN — GUAIFENESIN 600 MG: 600 TABLET, MULTILAYER, EXTENDED RELEASE ORAL at 08:34

## 2024-12-28 RX ADMIN — ASPIRIN 81 MG: 81 TABLET, CHEWABLE ORAL at 21:21

## 2024-12-28 RX ADMIN — SODIUM CHLORIDE, PRESERVATIVE FREE 10 ML: 5 INJECTION INTRAVENOUS at 21:23

## 2024-12-28 RX ADMIN — METHYLPREDNISOLONE SODIUM SUCCINATE 40 MG: 40 INJECTION, POWDER, FOR SOLUTION INTRAMUSCULAR; INTRAVENOUS at 08:35

## 2024-12-28 RX ADMIN — BUDESONIDE AND FORMOTEROL FUMARATE DIHYDRATE 2 PUFF: 160; 4.5 AEROSOL RESPIRATORY (INHALATION) at 09:47

## 2024-12-28 RX ADMIN — LOSARTAN POTASSIUM 25 MG: 25 TABLET, FILM COATED ORAL at 21:21

## 2024-12-28 RX ADMIN — Medication 2000 UNITS: at 08:42

## 2024-12-28 RX ADMIN — ALBUTEROL SULFATE 2.5 MG: 2.5 SOLUTION RESPIRATORY (INHALATION) at 09:46

## 2024-12-28 NOTE — PLAN OF CARE
Problem: Chronic Conditions and Co-morbidities  Goal: Patient's chronic conditions and co-morbidity symptoms are monitored and maintained or improved  12/28/2024 0958 by Mary Kay Rojo RN  Outcome: Progressing  12/28/2024 0142 by Kirti Ignacio RN  Outcome: Progressing  Flowsheets (Taken 12/28/2024 0142)  Care Plan - Patient's Chronic Conditions and Co-Morbidity Symptoms are Monitored and Maintained or Improved: Monitor and assess patient's chronic conditions and comorbid symptoms for stability, deterioration, or improvement     Problem: Discharge Planning  Goal: Discharge to home or other facility with appropriate resources  12/28/2024 0958 by Mary Kay Rojo RN  Outcome: Progressing  12/28/2024 0142 by Kirti Ignacio RN  Outcome: Progressing  Flowsheets (Taken 12/28/2024 0142)  Discharge to home or other facility with appropriate resources:   Identify barriers to discharge with patient and caregiver   Identify discharge learning needs (meds, wound care, etc)   Arrange for needed discharge resources and transportation as appropriate     Problem: ABCDS Injury Assessment  Goal: Absence of physical injury  Outcome: Progressing     Problem: Respiratory - Adult  Goal: Achieves optimal ventilation and oxygenation  Outcome: Progressing     Problem: Skin/Tissue Integrity  Goal: Absence of new skin breakdown  Description: 1.  Monitor for areas of redness and/or skin breakdown  2.  Assess vascular access sites hourly  3.  Every 4-6 hours minimum:  Change oxygen saturation probe site  4.  Every 4-6 hours:  If on nasal continuous positive airway pressure, respiratory therapy assess nares and determine need for appliance change or resting period.  Outcome: Progressing

## 2024-12-28 NOTE — CONSULTS
Spirometry in 2023 showed an FEV1 of 41% predicted with a ratio of 64.  She previously was using CPAP therapy however stopped using it after a hernia repair requiring abdominal wound VAC.  She also has a prior history of DVT at the time of her ventral hernia repair.  She was treated with 3 months of Eliquis therapy.  No reported PE.  She denies previous history of DVT or PE.  She is a former smoker.  She has a 60-pack-year history quitting in .  She also has a history of diabetes, hypertension, hyperlipidemia.    Today she is seen sitting at the edge of the bed on 3 L.  She was turned down to 2 L.  She states her breathing is better.  She states her cough is improving and sputum is now yellow in color.    PMH:   Past Medical History:   Diagnosis Date    Abdominal pain 2024    with feve and malaise , CT scan done and Dr. Purdy recommended immediate surgery but pt refused, see notes    Anxiety     Asthma     COPD (chronic obstructive pulmonary disease) (HCC)     COVID-19 vaccine series completed     Diabetes mellitus (HCC)     Full dentures     upper and lower, does not want anyone to see her without her dentures in    Goiter     Hyperlipidemia     Hyperparathyroidism (HCC)     Hypertension     Lives alone     Obesity     Oxygen dependent     2L / NC prn    Recurrent ventral hernia 2023    Sleep apnea     CPAP    Under care of team     Pulmonologist,   Dr. Crawfrod, last seen 10/31/2023, clearance on chart, high risk    Under care of team     Gen Surg, Dr. Purdy, last seen 2024    Wears glasses     Wellness examination     PCP, Dr. Jones, last seen 2023 , clearance on chart       PSH:   Past Surgical History:   Procedure Laterality Date     SECTION  1976    EXPLORATORY LAPAROTOMY W/ BOWEL RESECTION  2024    EXPLORATORY LAPAROTOMY, BOWEL RESECTION    HERNIA REPAIR N/A 2024    XI ROBOTIC LAPAROSCOPIC VENTRAL HERNIA REPAIR WITH MESH, EXPLANTATION OF OLD MESH,  LYSIS OF ADHESIONS performed by Christo Purdy DO at [facility] OR    HYSTERECTOMY (CERVIX STATUS UNKNOWN)  1997    with BSO    LAPAROTOMY N/A 3/6/2024    EXPLORATORY LAPAROTOMY, TRANSVERSE RESECTION OF TRANSVERSE COLON, EXPLANTATION OF OLD MESH, ABDOMINAL WASHOUT, WOUND VAC PLACEMENT performed by Christo Purdy DO at [facility] OR    TUBAL LIGATION  1978    with c section    VASCULAR SURGERY Right 3/12/2024    RIGHT MECHANICAL THROMBECTOMY OF LOWER EXTREMITY performed by Delos Reyes, Arthur, MD at [facility] CVOR    VENTRAL HERNIA REPAIR      x 2 prior to 2/2024       Allergies:   Allergies   Allergen Reactions    Levofloxacin Diarrhea    Lisinopril Cough       Home Meds:  Medications Prior to Admission: Semaglutide (RYBELSUS) 3 MG TABS, Take 3 mg by mouth daily  Cholecalciferol (VITAMIN D) 50 MCG (2000 UT) CAPS capsule, Take 1 capsule by mouth daily  ALBUTEROL SULFATE HFA IN, Inhale 1 puff into the lungs every 4 hours as needed As needed  Ascorbic Acid (VITAMIN C) 500 MG CAPS, Take 1 capsule by mouth daily  Budeson-Glycopyrrol-Formoterol (BREZTRI AEROSPHERE) 160-9-4.8 MCG/ACT AERO, Inhale 2 puffs into the lungs 2 times daily  OXYGEN, 2L/ NC for shortness of breath  empagliflozin (JARDIANCE) 25 MG tablet, Take 1 tablet by mouth daily  Omega-3 Fatty Acids (FISH OIL) 1000 MG capsule, Take by mouth 2 times daily  losartan (COZAAR) 25 MG tablet, Take 1 tablet by mouth at bedtime  furosemide (LASIX) 20 MG tablet, Take 0.5 tablets by mouth daily  albuterol (PROVENTIL) (2.5 MG/3ML) 0.083% nebulizer solution, Take 3 mLs by nebulization every 6 hours as needed for Wheezing  aspirin 81 MG tablet, Take 1 tablet by mouth at bedtime    Social History:   Social History     Socioeconomic History    Marital status:      Spouse name: Not on file    Number of children: Not on file    Years of education: Not on file    Highest education level: Not on file   Occupational History    Not on file   Tobacco Use    Smoking status: Former     Current

## 2024-12-28 NOTE — PLAN OF CARE
Problem: Chronic Conditions and Co-morbidities  Goal: Patient's chronic conditions and co-morbidity symptoms are monitored and maintained or improved  12/28/2024 0142 by Kirti Ignacio RN  Outcome: Progressing  Flowsheets (Taken 12/28/2024 0142)  Care Plan - Patient's Chronic Conditions and Co-Morbidity Symptoms are Monitored and Maintained or Improved: Monitor and assess patient's chronic conditions and comorbid symptoms for stability, deterioration, or improvement  12/27/2024 1854 by Ck Grider RN  Outcome: Progressing  Flowsheets (Taken 12/27/2024 0800)  Care Plan - Patient's Chronic Conditions and Co-Morbidity Symptoms are Monitored and Maintained or Improved: Monitor and assess patient's chronic conditions and comorbid symptoms for stability, deterioration, or improvement     Problem: Discharge Planning  Goal: Discharge to home or other facility with appropriate resources  12/28/2024 0142 by Kirti Ignacio RN  Outcome: Progressing  Flowsheets (Taken 12/28/2024 0142)  Discharge to home or other facility with appropriate resources:   Identify barriers to discharge with patient and caregiver   Identify discharge learning needs (meds, wound care, etc)   Arrange for needed discharge resources and transportation as appropriate  12/27/2024 1854 by Ck Grider, RN  Outcome: Progressing  Flowsheets (Taken 12/27/2024 0800)  Discharge to home or other facility with appropriate resources: Identify barriers to discharge with patient and caregiver

## 2024-12-28 NOTE — PROGRESS NOTES
[facility] Residency  Inpatient Service     Progress Note  12/28/2024    7:56 AM    Name:   Irais Max  MRN:     [id number]     Acct:      [id number]   Room:   [address] [place] [address] Day:  2  Admit Date:  12/26/2024  2:57 PM    PCP:   Carie Jones DO  Code Status:  Full Code    Subjective:     Overnight events: no overnight events     Patient was seen evaluated this morning appears to be improving.  She states that she feels better than when she came in.  She still on 3 L via nasal cannula and using her incentive spirometer/Acapella.  Her cough has improved since admission.  She denies chest pain, palpitations.  Pulmonology to see her today.     Medications:     Allergies:    Allergies   Allergen Reactions    Levofloxacin Diarrhea    Lisinopril Cough       Current Meds:   Scheduled Meds:    insulin lispro  0-4 Units SubCUTAneous 4x Daily AC & HS    sodium chloride flush  5-40 mL IntraVENous 2 times per day    enoxaparin  40 mg SubCUTAneous Daily    methylPREDNISolone  40 mg IntraVENous Daily    aspirin  81 mg Oral Nightly    empagliflozin  25 mg Oral Daily    losartan  25 mg Oral Nightly    furosemide  10 mg Oral Daily    ascorbic acid  500 mg Oral Daily    Vitamin D  2,000 Units Oral Daily    budesonide-formoterol  2 puff Inhalation BID RT    guaiFENesin  600 mg Oral BID    tiotropium  1 puff Inhalation Daily RT    azithromycin  500 mg IntraVENous Daily    cefTRIAXone (ROCEPHIN) IV  1,000 mg IntraVENous Daily     Continuous Infusions:    dextrose      sodium chloride       PRN Meds: glucose, dextrose bolus **OR** dextrose bolus, glucagon (rDNA), dextrose, sodium chloride flush, sodium chloride, ondansetron **OR** ondansetron, polyethylene glycol, acetaminophen **OR** acetaminophen, albuterol    ROS:   ROS is negative except for points noted above.    Data:     Vitals:  BP (!) 147/73   Pulse (!) 101   Temp 98.6 °F (37 °C) (Oral)   Resp 16   Ht 1.524 m (5')

## 2024-12-29 ENCOUNTER — APPOINTMENT (OUTPATIENT)
Dept: GENERAL RADIOLOGY | Age: 69
End: 2024-12-29
Payer: MEDICARE

## 2024-12-29 LAB
ANION GAP SERPL CALCULATED.3IONS-SCNC: 9 MMOL/L (ref 9–17)
BASOPHILS # BLD: 0 K/UL (ref 0–0.2)
BASOPHILS NFR BLD: 0 % (ref 0–2)
BNP SERPL-MCNC: 158 PG/ML
BUN SERPL-MCNC: 17 MG/DL (ref 8–23)
CALCIUM SERPL-MCNC: 10.7 MG/DL (ref 8.6–10.4)
CHLORIDE SERPL-SCNC: 99 MMOL/L (ref 98–107)
CO2 SERPL-SCNC: 32 MMOL/L (ref 20–31)
CREAT SERPL-MCNC: 0.4 MG/DL (ref 0.5–0.9)
EKG ATRIAL RATE: 108 BPM
EKG P AXIS: 23 DEGREES
EKG P-R INTERVAL: 124 MS
EKG Q-T INTERVAL: 338 MS
EKG QRS DURATION: 84 MS
EKG QTC CALCULATION (BAZETT): 452 MS
EKG R AXIS: 4 DEGREES
EKG T AXIS: 56 DEGREES
EKG VENTRICULAR RATE: 108 BPM
EOSINOPHIL # BLD: 0 K/UL (ref 0–0.4)
EOSINOPHILS RELATIVE PERCENT: 0 % (ref 1–4)
ERYTHROCYTE [DISTWIDTH] IN BLOOD BY AUTOMATED COUNT: 14.5 % (ref 12.5–15.4)
GFR, ESTIMATED: >90 ML/MIN/1.73M2
GLUCOSE BLD-MCNC: 130 MG/DL (ref 65–105)
GLUCOSE BLD-MCNC: 153 MG/DL (ref 65–105)
GLUCOSE BLD-MCNC: 165 MG/DL (ref 65–105)
GLUCOSE BLD-MCNC: 181 MG/DL (ref 65–105)
GLUCOSE SERPL-MCNC: 144 MG/DL (ref 70–99)
HCT VFR BLD AUTO: 45.9 % (ref 36–46)
HGB BLD-MCNC: 14 G/DL (ref 12–16)
LYMPHOCYTES NFR BLD: 1.76 K/UL (ref 1–4.8)
LYMPHOCYTES RELATIVE PERCENT: 12 % (ref 24–44)
MAGNESIUM SERPL-MCNC: 2.5 MG/DL (ref 1.6–2.6)
MCH RBC QN AUTO: 29.1 PG (ref 26–34)
MCHC RBC AUTO-ENTMCNC: 30.5 G/DL (ref 31–37)
MCV RBC AUTO: 95.4 FL (ref 80–100)
MICROORGANISM SPEC CULT: NORMAL
MICROORGANISM/AGENT SPEC: NORMAL
MONOCYTES NFR BLD: 0.74 K/UL (ref 0.1–0.8)
MONOCYTES NFR BLD: 5 % (ref 1–7)
MORPHOLOGY: ABNORMAL
NEUTROPHILS NFR BLD: 83 % (ref 36–66)
NEUTS SEG NFR BLD: 12.2 K/UL (ref 1.8–7.7)
PLATELET # BLD AUTO: 261 K/UL (ref 140–450)
PMV BLD AUTO: 10.7 FL (ref 8–14)
POTASSIUM SERPL-SCNC: 5 MMOL/L (ref 3.7–5.3)
RBC # BLD AUTO: 4.81 M/UL (ref 4–5.2)
SERVICE CMNT-IMP: NORMAL
SODIUM SERPL-SCNC: 140 MMOL/L (ref 135–144)
SPECIMEN DESCRIPTION: NORMAL
WBC OTHER # BLD: 14.7 K/UL (ref 3.5–11)

## 2024-12-29 PROCEDURE — 83880 ASSAY OF NATRIURETIC PEPTIDE: CPT

## 2024-12-29 PROCEDURE — 94761 N-INVAS EAR/PLS OXIMETRY MLT: CPT

## 2024-12-29 PROCEDURE — 6360000002 HC RX W HCPCS

## 2024-12-29 PROCEDURE — 2500000003 HC RX 250 WO HCPCS

## 2024-12-29 PROCEDURE — 36415 COLL VENOUS BLD VENIPUNCTURE: CPT

## 2024-12-29 PROCEDURE — 6370000000 HC RX 637 (ALT 250 FOR IP): Performed by: NURSE PRACTITIONER

## 2024-12-29 PROCEDURE — 80048 BASIC METABOLIC PNL TOTAL CA: CPT

## 2024-12-29 PROCEDURE — 2700000000 HC OXYGEN THERAPY PER DAY

## 2024-12-29 PROCEDURE — 6360000002 HC RX W HCPCS: Performed by: NURSE PRACTITIONER

## 2024-12-29 PROCEDURE — 85025 COMPLETE CBC W/AUTO DIFF WBC: CPT

## 2024-12-29 PROCEDURE — 94640 AIRWAY INHALATION TREATMENT: CPT

## 2024-12-29 PROCEDURE — 83735 ASSAY OF MAGNESIUM: CPT

## 2024-12-29 PROCEDURE — 6370000000 HC RX 637 (ALT 250 FOR IP)

## 2024-12-29 PROCEDURE — 82947 ASSAY GLUCOSE BLOOD QUANT: CPT

## 2024-12-29 PROCEDURE — 71045 X-RAY EXAM CHEST 1 VIEW: CPT

## 2024-12-29 PROCEDURE — 99232 SBSQ HOSP IP/OBS MODERATE 35: CPT | Performed by: FAMILY MEDICINE

## 2024-12-29 PROCEDURE — 1200000000 HC SEMI PRIVATE

## 2024-12-29 PROCEDURE — 93010 ELECTROCARDIOGRAM REPORT: CPT | Performed by: INTERNAL MEDICINE

## 2024-12-29 PROCEDURE — 94669 MECHANICAL CHEST WALL OSCILL: CPT

## 2024-12-29 RX ORDER — FUROSEMIDE 10 MG/ML
20 INJECTION INTRAMUSCULAR; INTRAVENOUS ONCE
Status: COMPLETED | OUTPATIENT
Start: 2024-12-29 | End: 2024-12-29

## 2024-12-29 RX ADMIN — GUAIFENESIN 1200 MG: 600 TABLET, MULTILAYER, EXTENDED RELEASE ORAL at 09:02

## 2024-12-29 RX ADMIN — SODIUM CHLORIDE, PRESERVATIVE FREE 10 ML: 5 INJECTION INTRAVENOUS at 21:59

## 2024-12-29 RX ADMIN — INSULIN LISPRO 1 UNITS: 100 INJECTION, SOLUTION INTRAVENOUS; SUBCUTANEOUS at 22:00

## 2024-12-29 RX ADMIN — LOSARTAN POTASSIUM 25 MG: 25 TABLET, FILM COATED ORAL at 22:00

## 2024-12-29 RX ADMIN — ENOXAPARIN SODIUM 40 MG: 100 INJECTION SUBCUTANEOUS at 09:08

## 2024-12-29 RX ADMIN — ALBUTEROL SULFATE 2.5 MG: 2.5 SOLUTION RESPIRATORY (INHALATION) at 01:16

## 2024-12-29 RX ADMIN — Medication 2000 UNITS: at 09:02

## 2024-12-29 RX ADMIN — METHYLPREDNISOLONE SODIUM SUCCINATE 40 MG: 40 INJECTION, POWDER, FOR SOLUTION INTRAMUSCULAR; INTRAVENOUS at 08:57

## 2024-12-29 RX ADMIN — ASPIRIN 81 MG: 81 TABLET, CHEWABLE ORAL at 22:00

## 2024-12-29 RX ADMIN — TIOTROPIUM BROMIDE INHALATION SPRAY 1 PUFF: 3.12 SPRAY, METERED RESPIRATORY (INHALATION) at 07:50

## 2024-12-29 RX ADMIN — FUROSEMIDE 20 MG: 10 INJECTION, SOLUTION INTRAMUSCULAR; INTRAVENOUS at 11:59

## 2024-12-29 RX ADMIN — EMPAGLIFLOZIN 25 MG: 10 TABLET, FILM COATED ORAL at 09:02

## 2024-12-29 RX ADMIN — DEXTROMETHORPHAN 60 MG: 30 SUSPENSION, EXTENDED RELEASE ORAL at 09:07

## 2024-12-29 RX ADMIN — METHYLPREDNISOLONE SODIUM SUCCINATE 40 MG: 40 INJECTION, POWDER, FOR SOLUTION INTRAMUSCULAR; INTRAVENOUS at 22:00

## 2024-12-29 RX ADMIN — ALBUTEROL SULFATE 2.5 MG: 2.5 SOLUTION RESPIRATORY (INHALATION) at 13:46

## 2024-12-29 RX ADMIN — SODIUM CHLORIDE, PRESERVATIVE FREE 10 ML: 5 INJECTION INTRAVENOUS at 09:08

## 2024-12-29 RX ADMIN — GUAIFENESIN 1200 MG: 600 TABLET, MULTILAYER, EXTENDED RELEASE ORAL at 22:00

## 2024-12-29 RX ADMIN — FUROSEMIDE 10 MG: 20 TABLET ORAL at 09:02

## 2024-12-29 RX ADMIN — Medication 500 MG: at 09:02

## 2024-12-29 RX ADMIN — WATER 1000 MG: 1 INJECTION INTRAMUSCULAR; INTRAVENOUS; SUBCUTANEOUS at 17:34

## 2024-12-29 RX ADMIN — ALBUTEROL SULFATE 2.5 MG: 2.5 SOLUTION RESPIRATORY (INHALATION) at 07:50

## 2024-12-29 RX ADMIN — BUDESONIDE AND FORMOTEROL FUMARATE DIHYDRATE 2 PUFF: 160; 4.5 AEROSOL RESPIRATORY (INHALATION) at 07:50

## 2024-12-29 NOTE — PROGRESS NOTES
effusion, atelectasis or pneumonia bibasilarly, stable mild pulmonary vascular congestion. Will give IV lasix 20 mg x 1 to diuresis. Pulmonology following, appreciate recommendations.     I have independently seen Irais Max and discussed the assessment and plan with the intern listed above and the attending Georgette Panda DO. I have reviewed the note and have included any edits or additional information above.     Claire Jose MD  Family Medicine Resident, PGY-3   12/29/2024  9:50 AM    Attending Physician Statement  I have seen and discussed the care of Irais Max including pertinent history and exam findings, with the resident. I have reviewed the key elements of all parts of the encounter with the resident at the time of the encounter. I agree with the assessment, plan and orders as documented by the resident.  Discussed plan with the patient and she was in agreement with the plan.  All questions answered.    Georgette Panda DO, MPH  12/29/2024

## 2024-12-29 NOTE — PROGRESS NOTES
[facility] PULMONARY,CRITICAL CARE & SLEEP   Akirabonilla Velásquez MD/Jacky CONNELLY AGASHINEP-BC, NP-C      Lazara CONNELLY NP-C     Dash CONNELLY NP-C                                          Pulmonary Progress Note    Patient - Irais Max   Age - 69 y.o.   - 1955  MRN - [id number]  [id number]  Date of Admission - 2024  2:57 PM    Consulting Service/Physician:       Primary Care Physician: Carie Jones DO    SUBJECTIVE:     Chief Complaint:   Chief Complaint   Patient presents with    Cough    Shortness of Breath     Productive cough with brown sputum.  Shortness of breath.  Onset about 1 week ago.  Pt on home 02 for COPD.      Subjective:    Irais is seen sitting up in a chair.  She states she is feeling slightly better.  Chest x-ray today shows some increase in congestion and atelectasis.  She continues on Rocephin and Zithromax.  She continues with a loose cough.  She states her cough does seem better.  Her white blood cell count is downtrending.  Respiratory viral panel was negative.  Sputum culture negative so far.  Strep pneumonia and mycoplasma were negative.  She has been afebrile.  She remains on 2 L.    VITALS  BP (!) 121/56   Pulse 74   Temp 98 °F (36.7 °C) (Oral)   Resp 16   Ht 1.524 m (5')   Wt 100 kg (220 lb 7.4 oz)   SpO2 94%   BMI 43.06 kg/m²   Wt Readings from Last 3 Encounters:   24 100 kg (220 lb 7.4 oz)   24 90.7 kg (200 lb)   24 90.7 kg (200 lb)     I/O (24 Hours)    Intake/Output Summary (Last 24 hours) at 2024 1439  Last data filed at 2024 0912  Gross per 24 hour   Intake 400 ml   Output --   Net 400 ml     Ventilator:      Exam:   Physical Exam   Constitutional: Obese female sitting up in a chair on 2 L in no acute distress  HENT: Unremarkable  Head: Normocephalic and atraumatic.   Eyes: EOM are normal. Pupils are equal, round, and reactive to light.   Neck: Neck supple.  12/29/2024     Lab Results   Component Value Date    CALCIUM 10.7 (H) 12/29/2024     12/29/2024    K 5.0 12/29/2024    CO2 32 (H) 12/29/2024    CL 99 12/29/2024    BUN 17 12/29/2024    CREATININE 0.4 (L) 12/29/2024       Lab Results   Component Value Date    INR 1.2 03/11/2024    PROTIME 15.3 (H) 03/11/2024       Radiology:     12/29/2024 12/26/2024    My reading of film: Atelectasis, mild fluid in the fissure, possible trace pleural effusion.    ASSESSMENT:       Left perihilar pneumonia  Acute on chronic hypoxic respiratory failure, baseline 2 L with exertion  Stage III COPD, FEV1 10/2023 41% with ratio 64, Dr. Crawford  Diabetes  Hypertension  Hyperlipidemia  Morbid obesity  ROCCO, recently noncompliant with CPAP  History of tobacco use, 60-pack-year, quit 2014  Full code  PLAN:   Agree with extra dose of Lasix given earlier today  Continue oral Lasix  We will add on BNP  Continue antibiotics  Continue bronchodilators  Wean oxygen, keep pulse ox above 89%.  Uses oxygen at home on as needed basis  Continue cough suppressant  Will transition to oral steroids tomorrow  Follow blood cultures  Hopefully ready for discharge in the next 24 to 48 hours  Outpatient follow-up in our office      Electronically signed by GODWIN Razo CNP on 12/29/24     This progress note was completed using a voice transcription system. Every effort was made to ensure accuracy. However, inadvertent computerized transcription errors may be present.    Lazara Lauren, NP-C, MSN  [facility] Pulmonary, Critical Care & Sleep

## 2024-12-29 NOTE — PLAN OF CARE
Problem: Chronic Conditions and Co-morbidities  Goal: Patient's chronic conditions and co-morbidity symptoms are monitored and maintained or improved  12/28/2024 2321 by Albina Perez RN  Outcome: Progressing  12/28/2024 0958 by Mary Kay Rojo RN  Outcome: Progressing     Problem: Discharge Planning  Goal: Discharge to home or other facility with appropriate resources  12/28/2024 2321 by Albina Perez RN  Outcome: Progressing  12/28/2024 0958 by Mary Kay Rojo RN  Outcome: Progressing     Problem: ABCDS Injury Assessment  Goal: Absence of physical injury  12/28/2024 2321 by Albina Perez RN  Outcome: Progressing  12/28/2024 0958 by Mary Kay Rojo RN  Outcome: Progressing     Problem: Respiratory - Adult  Goal: Achieves optimal ventilation and oxygenation  12/28/2024 2321 by Albina Perez RN  Outcome: Progressing  12/28/2024 1939 by Hayley Johnston RCP  Outcome: Progressing  Flowsheets (Taken 12/28/2024 1939)  Achieves optimal ventilation and oxygenation:   Assess for changes in respiratory status   Respiratory therapy support as indicated   Oxygen supplementation based on oxygen saturation or arterial blood gases   Assess and instruct to report shortness of breath or any respiratory difficulty   Encourage broncho-pulmonary hygiene including cough, deep breathe, incentive spirometry  12/28/2024 0958 by Mary Kay Rojo RN  Outcome: Progressing     Problem: Skin/Tissue Integrity  Goal: Absence of new skin breakdown  Description: 1.  Monitor for areas of redness and/or skin breakdown  2.  Assess vascular access sites hourly  3.  Every 4-6 hours minimum:  Change oxygen saturation probe site  4.  Every 4-6 hours:  If on nasal continuous positive airway pressure, respiratory therapy assess nares and determine need for appliance change or resting period.  12/28/2024 2321 by Albina Perez RN  Outcome: Progressing  12/28/2024 0958 by Mary Kay Rojo RN  Outcome: Progressing     Problem: Safety - Adult  Goal: Free from  fall injury  Outcome: Progressing

## 2024-12-29 NOTE — PLAN OF CARE
Problem: Respiratory - Adult  Goal: Achieves optimal ventilation and oxygenation  12/28/2024 1939 by Hayley Johnston RCP  Outcome: Progressing  Flowsheets (Taken 12/28/2024 1939)  Achieves optimal ventilation and oxygenation:   Assess for changes in respiratory status   Respiratory therapy support as indicated   Oxygen supplementation based on oxygen saturation or arterial blood gases   Assess and instruct to report shortness of breath or any respiratory difficulty   Encourage broncho-pulmonary hygiene including cough, deep breathe, incentive spirometry

## 2024-12-30 VITALS
OXYGEN SATURATION: 93 % | DIASTOLIC BLOOD PRESSURE: 59 MMHG | TEMPERATURE: 98 F | BODY MASS INDEX: 43.28 KG/M2 | RESPIRATION RATE: 18 BRPM | WEIGHT: 220.46 LBS | HEIGHT: 60 IN | SYSTOLIC BLOOD PRESSURE: 128 MMHG | HEART RATE: 64 BPM

## 2024-12-30 LAB
ANION GAP SERPL CALCULATED.3IONS-SCNC: 7 MMOL/L (ref 9–17)
BASOPHILS # BLD: 0 K/UL (ref 0–0.2)
BASOPHILS NFR BLD: 0 % (ref 0–2)
BUN SERPL-MCNC: 22 MG/DL (ref 8–23)
CALCIUM SERPL-MCNC: 11 MG/DL (ref 8.6–10.4)
CHLORIDE SERPL-SCNC: 96 MMOL/L (ref 98–107)
CO2 SERPL-SCNC: 36 MMOL/L (ref 20–31)
CREAT SERPL-MCNC: 0.5 MG/DL (ref 0.5–0.9)
EOSINOPHIL # BLD: 0 K/UL (ref 0–0.4)
EOSINOPHILS RELATIVE PERCENT: 0 % (ref 1–4)
ERYTHROCYTE [DISTWIDTH] IN BLOOD BY AUTOMATED COUNT: 14.4 % (ref 12.5–15.4)
GFR, ESTIMATED: >90 ML/MIN/1.73M2
GLUCOSE BLD-MCNC: 143 MG/DL (ref 65–105)
GLUCOSE BLD-MCNC: 220 MG/DL (ref 65–105)
GLUCOSE SERPL-MCNC: 158 MG/DL (ref 70–99)
HCT VFR BLD AUTO: 45.1 % (ref 36–46)
HGB BLD-MCNC: 14.8 G/DL (ref 12–16)
LYMPHOCYTES NFR BLD: 1.05 K/UL (ref 1–4.8)
LYMPHOCYTES RELATIVE PERCENT: 7 % (ref 24–44)
M PNEUMO IGM SER QL IA: 0.1
MAGNESIUM SERPL-MCNC: 2.6 MG/DL (ref 1.6–2.6)
MCH RBC QN AUTO: 29.5 PG (ref 26–34)
MCHC RBC AUTO-ENTMCNC: 32.9 G/DL (ref 31–37)
MCV RBC AUTO: 89.8 FL (ref 80–100)
MONOCYTES NFR BLD: 0.75 K/UL (ref 0.1–0.8)
MONOCYTES NFR BLD: 5 % (ref 1–7)
MORPHOLOGY: ABNORMAL
NEUTROPHILS NFR BLD: 88 % (ref 36–66)
NEUTS SEG NFR BLD: 13.2 K/UL (ref 1.8–7.7)
PLATELET # BLD AUTO: 386 K/UL (ref 140–450)
PMV BLD AUTO: 8.2 FL (ref 6–12)
POTASSIUM SERPL-SCNC: 4.7 MMOL/L (ref 3.7–5.3)
RBC # BLD AUTO: 5.03 M/UL (ref 4–5.2)
SODIUM SERPL-SCNC: 139 MMOL/L (ref 135–144)
WBC OTHER # BLD: 15 K/UL (ref 3.5–11)

## 2024-12-30 PROCEDURE — 94640 AIRWAY INHALATION TREATMENT: CPT

## 2024-12-30 PROCEDURE — 6370000000 HC RX 637 (ALT 250 FOR IP)

## 2024-12-30 PROCEDURE — 82947 ASSAY GLUCOSE BLOOD QUANT: CPT

## 2024-12-30 PROCEDURE — 99238 HOSP IP/OBS DSCHRG MGMT 30/<: CPT | Performed by: FAMILY MEDICINE

## 2024-12-30 PROCEDURE — 6360000002 HC RX W HCPCS: Performed by: NURSE PRACTITIONER

## 2024-12-30 PROCEDURE — 80048 BASIC METABOLIC PNL TOTAL CA: CPT

## 2024-12-30 PROCEDURE — 94761 N-INVAS EAR/PLS OXIMETRY MLT: CPT

## 2024-12-30 PROCEDURE — 83735 ASSAY OF MAGNESIUM: CPT

## 2024-12-30 PROCEDURE — 2700000000 HC OXYGEN THERAPY PER DAY

## 2024-12-30 PROCEDURE — 6370000000 HC RX 637 (ALT 250 FOR IP): Performed by: NURSE PRACTITIONER

## 2024-12-30 PROCEDURE — 85025 COMPLETE CBC W/AUTO DIFF WBC: CPT

## 2024-12-30 PROCEDURE — 36415 COLL VENOUS BLD VENIPUNCTURE: CPT

## 2024-12-30 PROCEDURE — 2500000003 HC RX 250 WO HCPCS

## 2024-12-30 PROCEDURE — 6360000002 HC RX W HCPCS

## 2024-12-30 PROCEDURE — 99211 OFF/OP EST MAY X REQ PHY/QHP: CPT

## 2024-12-30 PROCEDURE — 94618 PULMONARY STRESS TESTING: CPT

## 2024-12-30 RX ORDER — CEFUROXIME AXETIL 500 MG/1
500 TABLET ORAL 2 TIMES DAILY
Qty: 14 TABLET | Refills: 0 | Status: SHIPPED | OUTPATIENT
Start: 2024-12-30 | End: 2025-01-06

## 2024-12-30 RX ORDER — ALBUTEROL SULFATE 90 UG/1
2 INHALANT RESPIRATORY (INHALATION) EVERY 4 HOURS PRN
Qty: 1 EACH | Refills: 2 | Status: SHIPPED | OUTPATIENT
Start: 2024-12-30 | End: 2025-01-29

## 2024-12-30 RX ORDER — ALBUTEROL SULFATE 0.83 MG/ML
2.5 SOLUTION RESPIRATORY (INHALATION) EVERY 4 HOURS PRN
Qty: 120 EACH | Refills: 3 | Status: SHIPPED | OUTPATIENT
Start: 2024-12-30

## 2024-12-30 RX ORDER — PREDNISONE 10 MG/1
TABLET ORAL
Qty: 31 TABLET | Refills: 0 | Status: SHIPPED | OUTPATIENT
Start: 2024-12-30

## 2024-12-30 RX ORDER — ALBUTEROL SULFATE 0.83 MG/ML
2.5 SOLUTION RESPIRATORY (INHALATION)
Qty: 120 EACH | Refills: 3 | Status: SHIPPED | OUTPATIENT
Start: 2024-12-30

## 2024-12-30 RX ADMIN — Medication 2000 UNITS: at 08:58

## 2024-12-30 RX ADMIN — FUROSEMIDE 10 MG: 20 TABLET ORAL at 08:56

## 2024-12-30 RX ADMIN — ALBUTEROL SULFATE 2.5 MG: 2.5 SOLUTION RESPIRATORY (INHALATION) at 08:23

## 2024-12-30 RX ADMIN — SODIUM CHLORIDE, PRESERVATIVE FREE 10 ML: 5 INJECTION INTRAVENOUS at 09:06

## 2024-12-30 RX ADMIN — INSULIN LISPRO 1 UNITS: 100 INJECTION, SOLUTION INTRAVENOUS; SUBCUTANEOUS at 12:03

## 2024-12-30 RX ADMIN — EMPAGLIFLOZIN 25 MG: 10 TABLET, FILM COATED ORAL at 08:55

## 2024-12-30 RX ADMIN — BUDESONIDE AND FORMOTEROL FUMARATE DIHYDRATE 2 PUFF: 160; 4.5 AEROSOL RESPIRATORY (INHALATION) at 08:23

## 2024-12-30 RX ADMIN — ENOXAPARIN SODIUM 40 MG: 100 INJECTION SUBCUTANEOUS at 08:57

## 2024-12-30 RX ADMIN — METHYLPREDNISOLONE SODIUM SUCCINATE 40 MG: 40 INJECTION, POWDER, FOR SOLUTION INTRAMUSCULAR; INTRAVENOUS at 08:55

## 2024-12-30 RX ADMIN — WATER 1000 MG: 1 INJECTION INTRAMUSCULAR; INTRAVENOUS; SUBCUTANEOUS at 16:31

## 2024-12-30 RX ADMIN — ALBUTEROL SULFATE 2.5 MG: 2.5 SOLUTION RESPIRATORY (INHALATION) at 14:16

## 2024-12-30 RX ADMIN — GUAIFENESIN 1200 MG: 600 TABLET, MULTILAYER, EXTENDED RELEASE ORAL at 08:56

## 2024-12-30 RX ADMIN — DEXTROMETHORPHAN 60 MG: 30 SUSPENSION, EXTENDED RELEASE ORAL at 09:11

## 2024-12-30 RX ADMIN — Medication 500 MG: at 08:56

## 2024-12-30 RX ADMIN — TIOTROPIUM BROMIDE INHALATION SPRAY 1 PUFF: 3.12 SPRAY, METERED RESPIRATORY (INHALATION) at 08:23

## 2024-12-30 NOTE — PROGRESS NOTES
Patient was evaluated today for the diagnosis of COPD, chronic hypoxic respiratory failure .  I entered a DME order for home oxygen at 2 lpm at rest and 3 lpm with ambulation because the diagnosis and testing require the patient to have supplemental oxygen.  Condition will improve or be benefited by oxygen use.  The patient is  able to perform good mobility in a home setting and therefore does require the use of a portable oxygen system.  The need for this equipment was discussed with the patient and she understands and is in agreement.      Electronically signed by Claire Jose MD on 12/30/2024 at 11:30 AM

## 2024-12-30 NOTE — FLOWSHEET NOTE
12/30/24 1712   AVS Reviewed   AVS & discharge instructions reviewed with patient and/or representative? Yes   Reviewed instructions with Patient   Level of Understanding Questions answered;Return demonstration;Verbalized understanding     Patient alert and oriented x 4. Able to make needs known. Discharge instruction given with no further back with understanding. Personal belonging listed sent with patient. Her friend dropped off the  for her oxygen tank but patient decided to use the cylinder tank because she does not if it will be fully charged. Daughter in law came to the room to get the patient a ride. Stable condition upon discharge. No c/o pain/discomfort. IV out with clean dry dressing. Aware that she has meds to be picked up at her pharmacy. Staff took patient via wheelchair to entrance C where her ride located.

## 2024-12-30 NOTE — PLAN OF CARE
Problem: Chronic Conditions and Co-morbidities  Goal: Patient's chronic conditions and co-morbidity symptoms are monitored and maintained or improved  12/30/2024 1720 by Mary Kay Rojo RN  Outcome: Adequate for Discharge  12/30/2024 0914 by Mary Kay Rojo RN  Outcome: Progressing  12/30/2024 0434 by Jessica Marin RN  Outcome: Progressing  Flowsheets (Taken 12/29/2024 2100)  Care Plan - Patient's Chronic Conditions and Co-Morbidity Symptoms are Monitored and Maintained or Improved: Monitor and assess patient's chronic conditions and comorbid symptoms for stability, deterioration, or improvement     Problem: Discharge Planning  Goal: Discharge to home or other facility with appropriate resources  12/30/2024 1720 by Mary Kay Rojo RN  Outcome: Adequate for Discharge  12/30/2024 0914 by Mary Kay Rojo RN  Outcome: Progressing  12/30/2024 0434 by Jessica Marin RN  Outcome: Progressing  Flowsheets (Taken 12/29/2024 2100)  Discharge to home or other facility with appropriate resources:   Identify barriers to discharge with patient and caregiver   Arrange for needed discharge resources and transportation as appropriate   Identify discharge learning needs (meds, wound care, etc)   Refer to discharge planning if patient needs post-hospital services based on physician order or complex needs related to functional status, cognitive ability or social support system     Problem: ABCDS Injury Assessment  Goal: Absence of physical injury  12/30/2024 1720 by Mary Kay Rojo RN  Outcome: Adequate for Discharge  12/30/2024 0914 by Mary Kay Rojo RN  Outcome: Progressing  12/30/2024 0434 by Jessica Marin RN  Outcome: Progressing     Problem: Respiratory - Adult  Goal: Achieves optimal ventilation and oxygenation  12/30/2024 1720 by Mary Kay Rojo RN  Outcome: Adequate for Discharge  12/30/2024 0914 by Mary Kay Rojo RN  Outcome: Progressing  12/30/2024 0434 by Jessica Marin RN  Outcome: Progressing  Flowsheets (Taken 12/29/2024 2100)  Achieves

## 2024-12-30 NOTE — PROGRESS NOTES
[facility] Residency  Inpatient Service     Progress Note  12/30/2024    8:42 AM    Name:   Irais Max  MRN:     [id number]     Acct:      [id number]   Room:   [address] [place] [address] Day:  4  Admit Date:  12/26/2024  2:57 PM    PCP:   Carie Jones DO  Code Status:  Full Code    Subjective:     Overnight events: no overnight events     Pt was examined at bedside this morning. Pt's breathing improved. She complained of not sleeping well because she was cold. Denied chest pain, SOB, trouble breathing.   Pt would like to go home with home health after discharge.     Medications:     Allergies:    Allergies   Allergen Reactions    Levofloxacin Diarrhea    Lisinopril Cough       Current Meds:   Scheduled Meds:    methylPREDNISolone  40 mg IntraVENous Q12H    guaiFENesin  1,200 mg Oral BID    dextromethorphan  60 mg Oral 2 times per day    albuterol  2.5 mg Nebulization TID RT    insulin lispro  0-4 Units SubCUTAneous 4x Daily AC & HS    sodium chloride flush  5-40 mL IntraVENous 2 times per day    enoxaparin  40 mg SubCUTAneous Daily    aspirin  81 mg Oral Nightly    empagliflozin  25 mg Oral Daily    losartan  25 mg Oral Nightly    furosemide  10 mg Oral Daily    ascorbic acid  500 mg Oral Daily    Vitamin D  2,000 Units Oral Daily    budesonide-formoterol  2 puff Inhalation BID RT    tiotropium  1 puff Inhalation Daily RT    cefTRIAXone (ROCEPHIN) IV  1,000 mg IntraVENous Daily     Continuous Infusions:    dextrose      sodium chloride       PRN Meds: glucose, dextrose bolus **OR** dextrose bolus, glucagon (rDNA), dextrose, sodium chloride flush, sodium chloride, ondansetron **OR** ondansetron, polyethylene glycol, acetaminophen **OR** acetaminophen, albuterol    ROS:   ROS is negative except for points noted above.    Data:     Vitals:  BP (!) 128/59   Pulse 64   Temp 98 °F (36.7 °C) (Oral)   Resp 16   Ht 1.524 m (5')   Wt 100 kg (220 lb 7.4 oz)   SpO2 96%    discharge.  Pulmonology to see to transition patient over to oral steroid taper.  Medically stable for discharge home with home health care today.    I have independently seen Irais Max and discussed the assessment and plan with the intern listed above and the attending Georgette Panda DO. I have reviewed the note and have included any edits or additional information above.     Claire Jose MD  Family Medicine Resident, PGY-3   12/30/2024  10:16 AM    Attending Physician Statement  I have seen and discussed the care of Irais Max including pertinent history and exam findings, with the resident. I have reviewed the key elements of all parts of the encounter with the resident at the time of the encounter. I agree with the assessment, plan and orders as documented by the resident.  Discussed plan with the patient and she was in agreement with the plan.  All questions answered.    Georgette Panda DO, MPH  12/30/2024

## 2024-12-30 NOTE — PROGRESS NOTES
[facility]  Progress Note      NAME:  Irais Max  MEDICAL RECORD NUMBER:  [id number]  AGE: 69 y.o.   GENDER: female  : 1955  TODAY'S DATE:  2024      [facility] nurse consult for \"Wound: previous surgery for umbilical hernia\". Patient has history of abdominal surgery with subsequent wound care. Mrs. Max had followed at [facility] wound care center and wound was deemed closed in . Upon assessment patient has scar to midline abd/umbilical site no open areas or wounds appreciated. Patient expressed concern over \"flaking\" skin at site, instructed to use moisturizing lotion as needed.       [facility] nursing care not indicated, will sign off case. Please call or message us if concerns arise. The [facility] nursing team can be reached during working hours Monday-Friday [phone] via [employer] by searching \"wound ostomy\" under groups and choosing the [facility] option. Sending messages via individual names will not reach a clinician.

## 2024-12-30 NOTE — PLAN OF CARE
Problem: Chronic Conditions and Co-morbidities  Goal: Patient's chronic conditions and co-morbidity symptoms are monitored and maintained or improved  Outcome: Progressing  Flowsheets (Taken 12/29/2024 2100)  Care Plan - Patient's Chronic Conditions and Co-Morbidity Symptoms are Monitored and Maintained or Improved: Monitor and assess patient's chronic conditions and comorbid symptoms for stability, deterioration, or improvement     Problem: Discharge Planning  Goal: Discharge to home or other facility with appropriate resources  Outcome: Progressing  Flowsheets (Taken 12/29/2024 2100)  Discharge to home or other facility with appropriate resources:   Identify barriers to discharge with patient and caregiver   Arrange for needed discharge resources and transportation as appropriate   Identify discharge learning needs (meds, wound care, etc)   Refer to discharge planning if patient needs post-hospital services based on physician order or complex needs related to functional status, cognitive ability or social support system     Problem: ABCDS Injury Assessment  Goal: Absence of physical injury  Outcome: Progressing     Problem: Respiratory - Adult  Goal: Achieves optimal ventilation and oxygenation  Outcome: Progressing  Flowsheets (Taken 12/29/2024 2100)  Achieves optimal ventilation and oxygenation:   Assess for changes in mentation and behavior   Assess for changes in respiratory status   Position to facilitate oxygenation and minimize respiratory effort   Oxygen supplementation based on oxygen saturation or arterial blood gases   Respiratory therapy support as indicated     Problem: Skin/Tissue Integrity  Goal: Absence of new skin breakdown  Description: 1.  Monitor for areas of redness and/or skin breakdown  2.  Assess vascular access sites hourly  3.  Every 4-6 hours minimum:  Change oxygen saturation probe site  4.  Every 4-6 hours:  If on nasal continuous positive airway pressure, respiratory therapy assess  nares and determine need for appliance change or resting period.  Outcome: Progressing     Problem: Safety - Adult  Goal: Free from fall injury  Outcome: Progressing

## 2024-12-30 NOTE — PLAN OF CARE
Problem: Chronic Conditions and Co-morbidities  Goal: Patient's chronic conditions and co-morbidity symptoms are monitored and maintained or improved  12/30/2024 1720 by Mary Kay Rojo RN  Outcome: Adequate for Discharge  12/30/2024 1720 by Mary Kay Rojo RN  Outcome: Adequate for Discharge  12/30/2024 0914 by Mary Kay Rojo RN  Outcome: Progressing  12/30/2024 0434 by Jessica Marin RN  Outcome: Progressing  Flowsheets (Taken 12/29/2024 2100)  Care Plan - Patient's Chronic Conditions and Co-Morbidity Symptoms are Monitored and Maintained or Improved: Monitor and assess patient's chronic conditions and comorbid symptoms for stability, deterioration, or improvement     Problem: Discharge Planning  Goal: Discharge to home or other facility with appropriate resources  12/30/2024 1720 by Mary Kay Rojo RN  Outcome: Adequate for Discharge  12/30/2024 1720 by Mary Kay Rojo RN  Outcome: Adequate for Discharge  12/30/2024 0914 by Mary Kay Rojo RN  Outcome: Progressing  12/30/2024 0434 by Jessica Marin RN  Outcome: Progressing  Flowsheets (Taken 12/29/2024 2100)  Discharge to home or other facility with appropriate resources:   Identify barriers to discharge with patient and caregiver   Arrange for needed discharge resources and transportation as appropriate   Identify discharge learning needs (meds, wound care, etc)   Refer to discharge planning if patient needs post-hospital services based on physician order or complex needs related to functional status, cognitive ability or social support system     Problem: ABCDS Injury Assessment  Goal: Absence of physical injury  12/30/2024 1720 by Mary Kay Rojo RN  Outcome: Adequate for Discharge  12/30/2024 1720 by Mary Kay Rojo RN  Outcome: Adequate for Discharge  12/30/2024 0914 by Mary Kay Rojo RN  Outcome: Progressing  12/30/2024 0434 by Jessica Marin RN  Outcome: Progressing     Problem: Respiratory - Adult  Goal: Achieves optimal ventilation and oxygenation  12/30/2024 1720 by Alia  KALEIGH Muñiz  Outcome: Adequate for Discharge  12/30/2024 1720 by Mary Kay Rojo RN  Outcome: Adequate for Discharge  12/30/2024 0914 by Mary Kay Rojo RN  Outcome: Progressing  12/30/2024 0434 by Jessica Marin RN  Outcome: Progressing  Flowsheets (Taken 12/29/2024 2100)  Achieves optimal ventilation and oxygenation:   Assess for changes in mentation and behavior   Assess for changes in respiratory status   Position to facilitate oxygenation and minimize respiratory effort   Oxygen supplementation based on oxygen saturation or arterial blood gases   Respiratory therapy support as indicated     Problem: Skin/Tissue Integrity  Goal: Absence of new skin breakdown  Description: 1.  Monitor for areas of redness and/or skin breakdown  2.  Assess vascular access sites hourly  3.  Every 4-6 hours minimum:  Change oxygen saturation probe site  4.  Every 4-6 hours:  If on nasal continuous positive airway pressure, respiratory therapy assess nares and determine need for appliance change or resting period.  12/30/2024 1720 by Mary Kay Rojo RN  Outcome: Adequate for Discharge  12/30/2024 1720 by Mary Kay Rojo RN  Outcome: Adequate for Discharge  12/30/2024 0914 by Mary Kay Rojo RN  Outcome: Progressing  12/30/2024 0434 by Jessica Marin RN  Outcome: Progressing     Problem: Safety - Adult  Goal: Free from fall injury  12/30/2024 1720 by Mary Kay Rojo RN  Outcome: Adequate for Discharge  12/30/2024 1720 by Mary Kay Rojo RN  Outcome: Adequate for Discharge  12/30/2024 0914 by Mary Kay Rojo RN  Outcome: Progressing  12/30/2024 0434 by Jessica Marin RN  Outcome: Progressing

## 2024-12-30 NOTE — DISCHARGE INSTRUCTIONS
Date of admission: 12/26/2024  Date of discharge: 12/30/24    Your care was provided by the attending and resident physicians of the [facility] Medicine Residency Program. The primary encounter diagnosis was Pneumonia of left lung due to infectious organism, unspecified part of lung. A diagnosis of Hypoxia was also pertinent to this visit.    FOLLOW-UP  - Follow up with your primary care physician Carie Jones in 3 days.  - Follow up with your pulmonary physician Dr. Crawford in 3 days.    MEDICATIONS  -  your prednisone (steroid)  and ceftin (antibiotic) from the pharmacy and take as directed.  - Continue taking your other home medications as directed.    ADDITIONAL INSTRUCTIONS  - Please return immediately to the [facility] Emergency Department if you have difficulty breathing, shortness of breath, chest pain, or any other concerning symptoms.

## 2024-12-30 NOTE — DISCHARGE SUMMARY
Shortness of Breath or Wheezing As needed  What changed:   medication strength  See the new instructions.           * This list has 3 medication(s) that are the same as other medications prescribed for you. Read the directions carefully, and ask your doctor or other care provider to review them with you.                CONTINUE taking these medications      aspirin 81 MG tablet     Breztri Aerosphere 160-9-4.8 MCG/ACT Aero  Generic drug: Budeson-Glycopyrrol-Formoterol     fish oil 1000 MG capsule     furosemide 20 MG tablet  Commonly known as: LASIX     Jardiance 25 MG tablet  Generic drug: empagliflozin     losartan 25 MG tablet  Commonly known as: COZAAR     Rybelsus 3 MG Tabs  Generic drug: Semaglutide     Vitamin C 500 MG Caps     vitamin D 50 MCG (2000 UT) Caps capsule  Commonly known as: CHOLECALCIFEROL            STOP taking these medications      acetaminophen 500 MG tablet  Commonly known as: TYLENOL     OXYGEN               Where to Get Your Medications        These medications were sent to [facility] [id number] [facility] [address] [address] - P [phone] - F [phone]  [address] [facility] [address]      Phone: [phone]   albuterol (2.5 MG/3ML) 0.083% nebulizer solution  albuterol (2.5 MG/3ML) 0.083% nebulizer solution  albuterol sulfate  (90 Base) MCG/ACT inhaler  cefUROXime 500 MG tablet  predniSONE 10 MG tablet         Time spent on discharge is less than 30 minutes in patient examination, evaluation, counseling as well as medication reconciliation, prescriptions for required medications, discharge plan and follow up.    Discharge plan was discussed with DO Ck Perry MD  Family Medicine Resident, PGY-1  12/30/2024  2:30 PM    Senior Resident Attestation:    I have independently seen Irais Max and discussed the assessment and plan with the intern listed above and the attending Georgette Panda DO. I have reviewed the note and have

## 2024-12-30 NOTE — PLAN OF CARE
Problem: Chronic Conditions and Co-morbidities  Goal: Patient's chronic conditions and co-morbidity symptoms are monitored and maintained or improved  12/30/2024 0914 by Mary Kay Rojo RN  Outcome: Progressing  12/30/2024 0434 by Jessica Marin RN  Outcome: Progressing  Flowsheets (Taken 12/29/2024 2100)  Care Plan - Patient's Chronic Conditions and Co-Morbidity Symptoms are Monitored and Maintained or Improved: Monitor and assess patient's chronic conditions and comorbid symptoms for stability, deterioration, or improvement     Problem: Discharge Planning  Goal: Discharge to home or other facility with appropriate resources  12/30/2024 0914 by Mary Kay Rojo RN  Outcome: Progressing  12/30/2024 0434 by Jessica Marin RN  Outcome: Progressing  Flowsheets (Taken 12/29/2024 2100)  Discharge to home or other facility with appropriate resources:   Identify barriers to discharge with patient and caregiver   Arrange for needed discharge resources and transportation as appropriate   Identify discharge learning needs (meds, wound care, etc)   Refer to discharge planning if patient needs post-hospital services based on physician order or complex needs related to functional status, cognitive ability or social support system     Problem: ABCDS Injury Assessment  Goal: Absence of physical injury  12/30/2024 0914 by Mary Kay Rojo RN  Outcome: Progressing  12/30/2024 0434 by Jessica Marin RN  Outcome: Progressing     Problem: Respiratory - Adult  Goal: Achieves optimal ventilation and oxygenation  12/30/2024 0914 by Mary Kay Rojo RN  Outcome: Progressing  12/30/2024 0434 by Jessica Marin RN  Outcome: Progressing  Flowsheets (Taken 12/29/2024 2100)  Achieves optimal ventilation and oxygenation:   Assess for changes in mentation and behavior   Assess for changes in respiratory status   Position to facilitate oxygenation and minimize respiratory effort   Oxygen supplementation based on oxygen saturation or arterial blood gases

## 2024-12-30 NOTE — CARE COORDINATION
CM spoke with Lora from [facility] and they are unable to accept patient d/t insurance. Referral sent to [address].    1130- Home O2 evaluation and updated home oxygen orders sent to [facility]. CM notified Jo with [facility]. CM spoke with patient and she requests referral to [facility]. Referral sent. Patient states that her son will be her transportation home and she will have portable O2 at discharge. SILVIA and HHC order needed.    1400- Arlyn with [facility] confirmed they are able to accept patient. RN to complete SILVIA for HHC.    1530-  provided patient with portable [employer] O2 tank for transportation home.    Discharge Report    [facility]  Clinical Case Management Department  Written by: Katrin Smith RN    Patient Name: Irais JONES Mansoor  Attending Provider: Georgette Panda DO  Admit Date: 2024  2:57 PM  MRN: [id number]  Account: [id number]                     : 1955  Discharge Date: 24      Disposition: home w/ [facility]     Katrin Smith RN

## 2024-12-30 NOTE — PROGRESS NOTES
Pulmonary Progress Note  [facility]      Patient - Irais Max,  Age - 69 y.o.    - 1955      Room Number - 314/314-01   N -  [id number]   [facility] # - [id number]  Date of Admission -  2024  2:57 PM        Consulting Service/Physician   Consulting - Georgette Panda DO  Primary Care Physician - Carie Jones DO     SUBJECTIVE   She looks quite well, feels that she can go home    OBJECTIVE   VITALS    height is 1.524 m (5') and weight is 100 kg (220 lb 7.4 oz). Her oral temperature is 98 °F (36.7 °C). Her blood pressure is 128/59 (abnormal) and her pulse is 64. Her respiration is 18 and oxygen saturation is 95%.     Body mass index is 43.06 kg/m².  Temperature Range: Temp: 98 °F (36.7 °C) Temp  Av.2 °F (36.8 °C)  Min: 98 °F (36.7 °C)  Max: 98.4 °F (36.9 °C)  BP Range:  Systolic (24hrs), Av , Min:128 , Max:148     Diastolic (24hrs), Av, Min:59, Max:87    Pulse Range: Pulse  Av.3  Min: 64  Max: 74  Respiration Range: Resp  Av.7  Min: 16  Max: 18  Current Pulse Ox::  SpO2: 95 %  24HR Pulse Ox Range:  SpO2  Av.3 %  Min: 95 %  Max: 96 %  Oxygen Amount and Delivery: O2 Flow Rate (L/min): 3 L/min    Wt Readings from Last 3 Encounters:   24 100 kg (220 lb 7.4 oz)   24 90.7 kg (200 lb)   24 90.7 kg (200 lb)       I/O (24 Hours)    Intake/Output Summary (Last 24 hours) at 2024 1423  Last data filed at 2024 1758  Gross per 24 hour   Intake 250 ml   Output --   Net 250 ml       EXAM     General Appearance  Awake, alert, oriented, in no acute distress  HEENT - normocephalic, atraumatic. []  Mallampati  [x] Crowded airway   [] Macroglossia  []  Retrognathia  [] Micrognathia  []  Normal tongue size []  Normal Bite  [] [place] sign positive  No thrush  Neck - Supple,  trachea midline   Lungs -minimal wheeze  Cardiovascular - Heart sounds are normal.  Regular rate and rhythm   Abdomen - Soft,  nontender, nondistended, no masses or organomegaly  Neurologic - There are no focal motor or sensory deficits  Skin - No bruising or bleeding  Extremities - No clubbing, cyanosis, edema    MEDS      cefTRIAXone (ROCEPHIN) IV  1,000 mg IntraVENous Daily    methylPREDNISolone  40 mg IntraVENous Q12H    guaiFENesin  1,200 mg Oral BID    dextromethorphan  60 mg Oral 2 times per day    albuterol  2.5 mg Nebulization TID RT    insulin lispro  0-4 Units SubCUTAneous 4x Daily AC & HS    sodium chloride flush  5-40 mL IntraVENous 2 times per day    enoxaparin  40 mg SubCUTAneous Daily    aspirin  81 mg Oral Nightly    empagliflozin  25 mg Oral Daily    losartan  25 mg Oral Nightly    furosemide  10 mg Oral Daily    ascorbic acid  500 mg Oral Daily    Vitamin D  2,000 Units Oral Daily    budesonide-formoterol  2 puff Inhalation BID RT    tiotropium  1 puff Inhalation Daily RT      dextrose      sodium chloride       glucose, dextrose bolus **OR** dextrose bolus, glucagon (rDNA), dextrose, sodium chloride flush, sodium chloride, ondansetron **OR** ondansetron, polyethylene glycol, acetaminophen **OR** acetaminophen, albuterol    LABS   CBC   Recent Labs     12/30/24  0629   WBC 15.0*   HGB 14.8   HCT 45.1   MCV 89.8        BMP:   Lab Results   Component Value Date/Time     12/30/2024 06:29 AM    K 4.7 12/30/2024 06:29 AM    CL 96 12/30/2024 06:29 AM    CO2 36 12/30/2024 06:29 AM    BUN 22 12/30/2024 06:29 AM    CREATININE 0.5 12/30/2024 06:29 AM    CALCIUM 11.0 12/30/2024 06:29 AM    GFRAA >60 08/19/2022 10:45 AM    LABGLOM >90 12/30/2024 06:29 AM    LABGLOM >60 03/19/2024 06:32 AM     ABGs:No results found for: \"PHART\", \"PO2ART\", \"IJK2VGF\" No results found for: \"IFIO2\", \"MODE\", \"SETTIDVOL\", \"SETPEEP\"  Ionized Calcium:  No components found for: \"IONCA\"  Magnesium:    Lab Results   Component Value Date/Time    MG 2.6 12/30/2024 06:29 AM     Phosphorus:    Lab Results   Component Value Date/Time    PHOS 3.2

## 2024-12-30 NOTE — DISCHARGE INSTR - COC
mcg/0.5mL 03/18/2021, 04/15/2021, 04/24/2022    COVID-19, MODERNA Bivalent, (age 12y+), IM, 50 mcg/0.5 mL 12/07/2022    COVID-19, MODERNA, (age 12y+), IM, 50mcg/0.5mL 10/24/2023, 08/31/2024    Influenza Virus Vaccine 09/23/2020    Influenza, AFLURIA, FLUZONE, (age2 y+), IM, Trivalent MDV, 0.5mL 10/21/2016    Influenza, FLUAD, (age 65 y+), IM, Quadv, 0.5mL 10/13/2022, 09/22/2023    Influenza, FLUARIX, FLULAVAL, FLUZONE (age 6 mo+) and AFLURIA, (age 3 y+), Quadv PF, 0.5mL 01/11/2018, 11/29/2021    Influenza, FLUCELVAX, (age 6 mo+), MDCK, Quadv MDV, 0.5mL 10/12/2018    Influenza, FLUCELVAX, (age 6 mo+), MDCK, Quadv PF, 0.5mL 11/15/2019, 10/29/2020    PPD Test 03/17/2024, 03/26/2024    Pneumococcal, PCV-13, PREVNAR 13, (age 6w+), IM, 0.5mL 02/04/2016, 07/15/2019    Pneumococcal, PPSV23, PNEUMOVAX 23, (age 2y+), SC/IM, 0.5mL 07/26/2017       Active Problems:  Patient Active Problem List   Diagnosis Code    Incarcerated incisional hernia K43.0    Recurrent umbilical hernia K42.9    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    Acute deep vein thrombosis (DVT) of iliac vein of right lower extremity ([facility]) I82.421    Chronic obstructive pulmonary disease with pneumonia ([facility]) J44.0, J18.9    Type 2 diabetes mellitus with hyperglycemia, without long-term current use of insulin ([facility]) E11.65    Obstructive sleep apnea syndrome G47.33    Mixed hyperlipidemia E78.2    Hypertensive kidney disease with stage 2 chronic kidney disease I12.9, N18.2    Secondary hyperparathyroidism ([facility]) N25.81    Hx of deep venous thrombosis Z86.718    Former smoker Z87.891    Noncompliance with CPAP treatment Z91.199    Acute on chronic hypoxic respiratory failure J96.21       Isolation/Infection:   Isolation            No Isolation          Patient Infection Status       None to display                     Nurse Assessment:  Last Vital Signs: BP (!) 128/59   Pulse 64   Temp 98 °F (36.7 °C) (Oral)   Resp 18   Ht 1.524 m (5')   Wt 100 kg (220 lb 7.4 oz)   SpO2  95%   BMI 43.06 kg/m²     Last documented pain score (0-10 scale):    Last Weight:   Wt Readings from Last 1 Encounters:   12/26/24 100 kg (220 lb 7.4 oz)     Mental Status:  oriented, alert, coherent, and logical    IV Access:  - None    Nursing Mobility/ADLs:  Walking   Independent  Transfer  Independent  Bathing  Independent  Dressing  Independent  Toileting  Independent  Feeding  Independent  Med Admin  Independent  Med Delivery   whole    Wound Care Documentation and Therapy:  Puncture 03/12/24 Tibial (Active)   Number of days: 292       Incision 03/06/24 Abdomen Lower;Medial (Active)   Number of days: 298        Elimination:  Continence:   Bowel: Yes  Bladder: Yes  Urinary Catheter: None   Colostomy/Ileostomy/Ileal Conduit: No       Date of Last BM: 12/29/2024    Intake/Output Summary (Last 24 hours) at 12/30/2024 0959  Last data filed at 12/29/2024 1758  Gross per 24 hour   Intake 350 ml   Output --   Net 350 ml     I/O last 3 completed shifts:  In: 550 [P.O.:550]  Out: -     Safety Concerns:     At Risk for Fallsdue to oxygen tubing    Impairments/Disabilities:      None    Nutrition Therapy:  Current Nutrition Therapy:   - Oral Diet:  General    Routes of Feeding: Oral  Liquids: Thin Liquids  Daily Fluid Restriction: no  Last Modified Barium Swallow with Video (Video Swallowing Test): not done    Treatments at the Time of Hospital Discharge:   Respiratory Treatments: 3L oxygen via NC  Oxygen Therapy:  is on oxygen at 3 L/min per nasal cannula.  Ventilator:    - CPAP   only when sleeping    Rehab Therapies: PT/OT  Weight Bearing Status/Restrictions: No weight bearing restrictions  Other Medical Equipment (for information only, NOT a DME order):  none  Other Treatments: none    Patient's personal belongings (please select all that are sent with patient):  Clothes, oxygen tank    RN SIGNATURE:  Electronically signed by Mary Kay Rojo RN on 12/30/24 at 2:32 PM EST    CASE MANAGEMENT/SOCIAL WORK

## 2024-12-30 NOTE — FLOWSHEET NOTE
Home Oxygen Evaluation    Home Oxygen Evaluation completed.    Patient is on 2 liters per minute via nasal cannula.  Resting SpO2 = 92%  Resting SpO2 on room air = 86%    SpO2 on room air with exercise = 83%  SpO2 on oxygen as above with exercise = 87%  Sp02 on 3lpm with exercise= 92%        Bharti Singh RCP  11:10 AM

## 2024-12-31 LAB
MICROORGANISM SPEC CULT: NORMAL
MICROORGANISM SPEC CULT: NORMAL
SERVICE CMNT-IMP: NORMAL
SERVICE CMNT-IMP: NORMAL
SPECIMEN DESCRIPTION: NORMAL
SPECIMEN DESCRIPTION: NORMAL

## 2025-03-21 ENCOUNTER — HOSPITAL ENCOUNTER (OUTPATIENT)
Age: 70
Discharge: HOME | End: 2025-03-21
Payer: MEDICARE

## 2025-03-21 LAB
ALBUMIN SERPL-MCNC: 4.3 G/DL (ref 3.5–5.2)
ALBUMIN/GLOB SERPL: 1.6 {RATIO} (ref 1–2.5)
ALP SERPL-CCNC: 61 U/L (ref 35–104)
ALT SERPL-CCNC: 33 U/L (ref 10–35)
ANION GAP SERPL CALCULATED.3IONS-SCNC: 13 MMOL/L (ref 9–16)
AST SERPL-CCNC: 30 U/L (ref 10–35)
BILIRUB SERPL-MCNC: 1.4 MG/DL (ref 0–1.2)
BILIRUB UR QL STRIP: NEGATIVE
BUN SERPL-MCNC: 13 MG/DL (ref 8–23)
CALCIUM SERPL-MCNC: 10 MG/DL (ref 8.6–10.4)
CHLORIDE SERPL-SCNC: 102 MMOL/L (ref 98–107)
CHOLEST SERPL-MCNC: 166 MG/DL (ref 0–199)
CHOLESTEROL/HDL RATIO: 3.1
CLARITY UR: CLEAR
CO2 SERPL-SCNC: 24 MMOL/L (ref 20–31)
COLOR UR: YELLOW
COMMENT: ABNORMAL
CREAT SERPL-MCNC: 0.7 MG/DL (ref 0.6–0.9)
EST. AVERAGE GLUCOSE BLD GHB EST-MCNC: 183 MG/DL
GFR, ESTIMATED: >90 ML/MIN/1.73M2
GLUCOSE SERPL-MCNC: 193 MG/DL (ref 74–99)
GLUCOSE UR STRIP-MCNC: ABNORMAL MG/DL
HBA1C MFR BLD: 8 % (ref 4–6)
HDLC SERPL-MCNC: 53 MG/DL
HGB UR QL STRIP.AUTO: NEGATIVE
KETONES UR STRIP-MCNC: ABNORMAL MG/DL
LDLC SERPL CALC-MCNC: 66 MG/DL (ref 0–100)
LEUKOCYTE ESTERASE UR QL STRIP: NEGATIVE
NITRITE UR QL STRIP: NEGATIVE
PH UR STRIP: 5.5 [PH] (ref 5–8)
POTASSIUM SERPL-SCNC: 4 MMOL/L (ref 3.7–5.3)
PROT SERPL-MCNC: 7 G/DL (ref 6.6–8.7)
PROT UR STRIP-MCNC: NEGATIVE MG/DL
SODIUM SERPL-SCNC: 139 MMOL/L (ref 136–145)
SP GR UR STRIP: 1.03 (ref 1–1.03)
TRIGL SERPL-MCNC: 234 MG/DL
UROBILINOGEN UR STRIP-ACNC: NORMAL EU/DL (ref 0–1)
VLDLC SERPL CALC-MCNC: 47 MG/DL (ref 1–30)

## 2025-03-21 PROCEDURE — 87086 URINE CULTURE/COLONY COUNT: CPT

## 2025-03-21 PROCEDURE — 83036 HEMOGLOBIN GLYCOSYLATED A1C: CPT

## 2025-03-21 PROCEDURE — 81003 URINALYSIS AUTO W/O SCOPE: CPT

## 2025-03-21 PROCEDURE — 80061 LIPID PANEL: CPT

## 2025-03-21 PROCEDURE — 36415 COLL VENOUS BLD VENIPUNCTURE: CPT

## 2025-03-21 PROCEDURE — 80053 COMPREHEN METABOLIC PANEL: CPT

## 2025-03-22 LAB
MICROORGANISM SPEC CULT: NORMAL
SPECIMEN DESCRIPTION: NORMAL

## 2025-05-15 ENCOUNTER — TELEPHONE (OUTPATIENT)
Dept: FAMILY MEDICINE CLINIC | Age: 70
End: 2025-05-15

## 2025-05-15 NOTE — TELEPHONE ENCOUNTER
----- Message from Ghislaine PATEL sent at 5/15/2025  2:56 PM EDT -----  Regarding: [facility] Appointment Request  [facility] Appointment Request    Patient needs appointment for [facility] Appointment Type: New to Provider.    Patient Requested Dates(s): the soonest possible   Patient Requested Time:  10:00 AM  Provider Name:  GODWIN Mccabe-CNP      Reason for Appointment Request: Other New to provider   --------------------------------------------------------------------------------------------------------------------------    Relationship to Patient: Self     Call Back Information: OK to leave message on voicemail  Preferred Call Back Number: Phone [phone]

## 2025-05-15 NOTE — TELEPHONE ENCOUNTER
Patient has new patient appt scheduled with Dr. Seay 5/21- [facility] for patient to call office back to discuss if she would like to set up new patient appt for [place].

## 2025-05-18 SDOH — HEALTH STABILITY: PHYSICAL HEALTH: ON AVERAGE, HOW MANY DAYS PER WEEK DO YOU ENGAGE IN MODERATE TO STRENUOUS EXERCISE (LIKE A BRISK WALK)?: 1 DAY

## 2025-05-18 SDOH — HEALTH STABILITY: PHYSICAL HEALTH: ON AVERAGE, HOW MANY MINUTES DO YOU ENGAGE IN EXERCISE AT THIS LEVEL?: 0 MIN

## 2025-05-21 ENCOUNTER — OFFICE VISIT (OUTPATIENT)
Dept: PRIMARY CARE CLINIC | Age: 70
End: 2025-05-21
Payer: MEDICARE

## 2025-05-21 VITALS
BODY MASS INDEX: 41.6 KG/M2 | WEIGHT: 213 LBS | DIASTOLIC BLOOD PRESSURE: 52 MMHG | SYSTOLIC BLOOD PRESSURE: 132 MMHG | HEART RATE: 68 BPM | OXYGEN SATURATION: 96 %

## 2025-05-21 DIAGNOSIS — J96.11 CHRONIC RESPIRATORY FAILURE WITH HYPOXIA (HCC): ICD-10-CM

## 2025-05-21 DIAGNOSIS — N18.2 HYPERTENSIVE KIDNEY DISEASE WITH STAGE 2 CHRONIC KIDNEY DISEASE: ICD-10-CM

## 2025-05-21 DIAGNOSIS — J43.9 PULMONARY EMPHYSEMA, UNSPECIFIED EMPHYSEMA TYPE (HCC): Primary | ICD-10-CM

## 2025-05-21 DIAGNOSIS — Z87.891 PERSONAL HISTORY OF TOBACCO USE: ICD-10-CM

## 2025-05-21 DIAGNOSIS — I12.9 HYPERTENSIVE KIDNEY DISEASE WITH STAGE 2 CHRONIC KIDNEY DISEASE: ICD-10-CM

## 2025-05-21 DIAGNOSIS — Z86.718 HX OF DEEP VENOUS THROMBOSIS: ICD-10-CM

## 2025-05-21 DIAGNOSIS — E66.813 CLASS 3 OBESITY (HCC): ICD-10-CM

## 2025-05-21 DIAGNOSIS — Z86.39 HISTORY OF HASHIMOTO THYROIDITIS: ICD-10-CM

## 2025-05-21 DIAGNOSIS — E78.2 MIXED HYPERLIPIDEMIA: ICD-10-CM

## 2025-05-21 DIAGNOSIS — E11.65 TYPE 2 DIABETES MELLITUS WITH HYPERGLYCEMIA, WITHOUT LONG-TERM CURRENT USE OF INSULIN (HCC): ICD-10-CM

## 2025-05-21 DIAGNOSIS — Z78.0 POST-MENOPAUSAL: ICD-10-CM

## 2025-05-21 PROBLEM — J96.21 ACUTE ON CHRONIC HYPOXIC RESPIRATORY FAILURE (HCC): Status: RESOLVED | Noted: 2024-12-26 | Resolved: 2025-05-21

## 2025-05-21 PROBLEM — I82.421 ACUTE DEEP VEIN THROMBOSIS (DVT) OF ILIAC VEIN OF RIGHT LOWER EXTREMITY (HCC): Status: RESOLVED | Noted: 2024-03-12 | Resolved: 2025-05-21

## 2025-05-21 PROBLEM — J44.9 COPD (CHRONIC OBSTRUCTIVE PULMONARY DISEASE) (HCC): Status: ACTIVE | Noted: 2024-12-26

## 2025-05-21 PROBLEM — J44.0: Status: RESOLVED | Noted: 2024-12-26 | Resolved: 2025-05-21

## 2025-05-21 PROBLEM — J18.9: Status: RESOLVED | Noted: 2024-12-26 | Resolved: 2025-05-21

## 2025-05-21 PROBLEM — K63.2 COLOCUTANEOUS FISTULA: Status: RESOLVED | Noted: 2024-03-08 | Resolved: 2025-05-21

## 2025-05-21 PROCEDURE — 1159F MED LIST DOCD IN RCRD: CPT | Performed by: INTERNAL MEDICINE

## 2025-05-21 PROCEDURE — 2022F DILAT RTA XM EVC RTNOPTHY: CPT | Performed by: INTERNAL MEDICINE

## 2025-05-21 PROCEDURE — G8427 DOCREV CUR MEDS BY ELIG CLIN: HCPCS | Performed by: INTERNAL MEDICINE

## 2025-05-21 PROCEDURE — 99205 OFFICE O/P NEW HI 60 MIN: CPT | Performed by: INTERNAL MEDICINE

## 2025-05-21 PROCEDURE — G8399 PT W/DXA RESULTS DOCUMENT: HCPCS | Performed by: INTERNAL MEDICINE

## 2025-05-21 PROCEDURE — 3052F HG A1C>EQUAL 8.0%<EQUAL 9.0%: CPT | Performed by: INTERNAL MEDICINE

## 2025-05-21 PROCEDURE — 1036F TOBACCO NON-USER: CPT | Performed by: INTERNAL MEDICINE

## 2025-05-21 PROCEDURE — 1090F PRES/ABSN URINE INCON ASSESS: CPT | Performed by: INTERNAL MEDICINE

## 2025-05-21 PROCEDURE — 1123F ACP DISCUSS/DSCN MKR DOCD: CPT | Performed by: INTERNAL MEDICINE

## 2025-05-21 PROCEDURE — G0296 VISIT TO DETERM LDCT ELIG: HCPCS | Performed by: INTERNAL MEDICINE

## 2025-05-21 PROCEDURE — G8417 CALC BMI ABV UP PARAM F/U: HCPCS | Performed by: INTERNAL MEDICINE

## 2025-05-21 PROCEDURE — 3023F SPIROM DOC REV: CPT | Performed by: INTERNAL MEDICINE

## 2025-05-21 PROCEDURE — 3017F COLORECTAL CA SCREEN DOC REV: CPT | Performed by: INTERNAL MEDICINE

## 2025-05-21 RX ORDER — ATORVASTATIN CALCIUM 40 MG/1
40 TABLET, FILM COATED ORAL DAILY
Qty: 30 TABLET | Refills: 3 | Status: SHIPPED | OUTPATIENT
Start: 2025-05-21

## 2025-05-21 SDOH — ECONOMIC STABILITY: FOOD INSECURITY: WITHIN THE PAST 12 MONTHS, THE FOOD YOU BOUGHT JUST DIDN'T LAST AND YOU DIDN'T HAVE MONEY TO GET MORE.: NEVER TRUE

## 2025-05-21 SDOH — ECONOMIC STABILITY: FOOD INSECURITY: WITHIN THE PAST 12 MONTHS, YOU WORRIED THAT YOUR FOOD WOULD RUN OUT BEFORE YOU GOT MONEY TO BUY MORE.: NEVER TRUE

## 2025-05-21 ASSESSMENT — PATIENT HEALTH QUESTIONNAIRE - PHQ9
2. FEELING DOWN, DEPRESSED OR HOPELESS: NOT AT ALL
SUM OF ALL RESPONSES TO PHQ QUESTIONS 1-9: 0
1. LITTLE INTEREST OR PLEASURE IN DOING THINGS: NOT AT ALL

## 2025-05-21 NOTE — PROGRESS NOTES
daily      losartan (COZAAR) 25 MG tablet Take 1 tablet by mouth at bedtime      furosemide (LASIX) 20 MG tablet Take 0.5 tablets by mouth daily      aspirin 81 MG tablet Take 1 tablet by mouth at bedtime      albuterol sulfate HFA (PROVENTIL;VENTOLIN;PROAIR) 108 (90 Base) MCG/ACT inhaler Inhale 2 puffs into the lungs every 4 hours as needed for Shortness of Breath or Wheezing As needed 1 each 2     No current facility-administered medications for this visit.       OBJECTIVE:  Vitals:    05/21/25 1003   BP: (!) 132/52   Pulse: 68   SpO2: 96%       Focal exam:  Large midline abdominal healed incision with scarring.  Bulge of surrounding soft tissue, likely ventral wall hernia.  Soft, reducible, nontender.    General exam (except above):  Constitutional - well appearing, alert, in no acute distress  Eyes: Anicteric sclera. Pupils are equally round and reactive to light.  Extraocular movements are intact.  Skin: Skin color, texture, turgor normal  Respiratory - Lungs clear to auscultation.  No wheezing, rhonchi, rales  Cardiovascular - RRR. S1S2 present. No leg swelling.  Gastrointestinal - Abdomen soft, non-tender. Bowel sounds normal. No masses, organomegaly  Musculoskeletal - No joint swelling, deformity, or tenderness  Neuro - Pt Alert, awake and oriented x 3. No gross focal neurological deficits      ASSESSMENT AND PLAN (MEDICAL DECISION MAKING):   Irais was seen today for establish care and hernia .    Diagnoses and all orders for this visit:    Pulmonary emphysema, unspecified emphysema type (HCC)  Comments:  controlled, c/w current meds,    Personal history of tobacco use  -     OK VISIT TO DISCUSS LUNG CA SCREEN W LDCT  -     CT Lung Screen (Initial/Annual/Baseline); Future    Hypertensive kidney disease with stage 2 chronic kidney disease  Comments:  slightly elevated today due to pt being nervoux    Type 2 diabetes mellitus with hyperglycemia, without long-term current use of insulin (HCC)  -     atorvastatin

## 2025-06-09 ENCOUNTER — HOSPITAL ENCOUNTER (OUTPATIENT)
Age: 70
Setting detail: SPECIMEN
Discharge: HOME | End: 2025-06-09

## 2025-06-09 DIAGNOSIS — E11.65 TYPE 2 DIABETES MELLITUS WITH HYPERGLYCEMIA, WITHOUT LONG-TERM CURRENT USE OF INSULIN (HCC): ICD-10-CM

## 2025-06-09 DIAGNOSIS — Z86.39 HISTORY OF HASHIMOTO THYROIDITIS: ICD-10-CM

## 2025-06-09 LAB
CREAT UR-MCNC: 23.9 MG/DL (ref 28–217)
MICROALBUMIN UR-MCNC: <12 MG/L (ref 0–20)
MICROALBUMIN/CREAT UR-RTO: ABNORMAL MCG/MG CREAT (ref 0–25)
TSH SERPL DL<=0.05 MIU/L-ACNC: 1.45 UIU/ML (ref 0.27–4.2)

## 2025-06-13 RX ORDER — FUROSEMIDE 20 MG/1
10 TABLET ORAL DAILY
Qty: 60 TABLET | Refills: 0 | Status: SHIPPED | OUTPATIENT
Start: 2025-06-13

## 2025-06-18 ENCOUNTER — HOSPITAL ENCOUNTER (OUTPATIENT)
Dept: CT IMAGING | Age: 70
Discharge: HOME | End: 2025-06-20
Attending: INTERNAL MEDICINE
Payer: MEDICARE

## 2025-06-18 ENCOUNTER — HOSPITAL ENCOUNTER (OUTPATIENT)
Dept: MAMMOGRAPHY | Age: 70
Discharge: HOME | End: 2025-06-20
Attending: INTERNAL MEDICINE
Payer: MEDICARE

## 2025-06-18 DIAGNOSIS — Z87.891 PERSONAL HISTORY OF TOBACCO USE: ICD-10-CM

## 2025-06-18 DIAGNOSIS — Z78.0 POST-MENOPAUSAL: ICD-10-CM

## 2025-06-18 PROCEDURE — 77080 DXA BONE DENSITY AXIAL: CPT

## 2025-06-18 PROCEDURE — 71271 CT THORAX LUNG CANCER SCR C-: CPT

## 2025-06-27 SDOH — HEALTH STABILITY: PHYSICAL HEALTH: ON AVERAGE, HOW MANY DAYS PER WEEK DO YOU ENGAGE IN MODERATE TO STRENUOUS EXERCISE (LIKE A BRISK WALK)?: 1 DAY

## 2025-06-27 SDOH — HEALTH STABILITY: PHYSICAL HEALTH: ON AVERAGE, HOW MANY MINUTES DO YOU ENGAGE IN EXERCISE AT THIS LEVEL?: 0 MIN

## 2025-06-27 ASSESSMENT — LIFESTYLE VARIABLES
HOW OFTEN DO YOU HAVE SIX OR MORE DRINKS ON ONE OCCASION: 1
HOW OFTEN DO YOU HAVE A DRINK CONTAINING ALCOHOL: NEVER
HOW MANY STANDARD DRINKS CONTAINING ALCOHOL DO YOU HAVE ON A TYPICAL DAY: 0
HOW OFTEN DO YOU HAVE A DRINK CONTAINING ALCOHOL: 1
HOW MANY STANDARD DRINKS CONTAINING ALCOHOL DO YOU HAVE ON A TYPICAL DAY: PATIENT DOES NOT DRINK

## 2025-06-27 ASSESSMENT — PATIENT HEALTH QUESTIONNAIRE - PHQ9
SUM OF ALL RESPONSES TO PHQ QUESTIONS 1-9: 0
SUM OF ALL RESPONSES TO PHQ QUESTIONS 1-9: 0
2. FEELING DOWN, DEPRESSED OR HOPELESS: NOT AT ALL
SUM OF ALL RESPONSES TO PHQ QUESTIONS 1-9: 0
1. LITTLE INTEREST OR PLEASURE IN DOING THINGS: NOT AT ALL
SUM OF ALL RESPONSES TO PHQ QUESTIONS 1-9: 0

## 2025-06-30 ENCOUNTER — OFFICE VISIT (OUTPATIENT)
Dept: PRIMARY CARE CLINIC | Age: 70
End: 2025-06-30
Payer: MEDICARE

## 2025-06-30 VITALS
WEIGHT: 211.2 LBS | HEART RATE: 80 BPM | DIASTOLIC BLOOD PRESSURE: 60 MMHG | BODY MASS INDEX: 42.58 KG/M2 | HEIGHT: 59 IN | OXYGEN SATURATION: 95 % | SYSTOLIC BLOOD PRESSURE: 122 MMHG

## 2025-06-30 DIAGNOSIS — J96.11 CHRONIC RESPIRATORY FAILURE WITH HYPOXIA (HCC): ICD-10-CM

## 2025-06-30 DIAGNOSIS — M81.0 AGE-RELATED OSTEOPOROSIS WITHOUT CURRENT PATHOLOGICAL FRACTURE: ICD-10-CM

## 2025-06-30 DIAGNOSIS — N18.2 HYPERTENSIVE KIDNEY DISEASE WITH STAGE 2 CHRONIC KIDNEY DISEASE: ICD-10-CM

## 2025-06-30 DIAGNOSIS — I12.9 HYPERTENSIVE KIDNEY DISEASE WITH STAGE 2 CHRONIC KIDNEY DISEASE: ICD-10-CM

## 2025-06-30 DIAGNOSIS — E11.65 TYPE 2 DIABETES MELLITUS WITH HYPERGLYCEMIA, WITHOUT LONG-TERM CURRENT USE OF INSULIN (HCC): ICD-10-CM

## 2025-06-30 DIAGNOSIS — Z00.00 INITIAL MEDICARE ANNUAL WELLNESS VISIT: Primary | ICD-10-CM

## 2025-06-30 DIAGNOSIS — J43.9 PULMONARY EMPHYSEMA, UNSPECIFIED EMPHYSEMA TYPE (HCC): ICD-10-CM

## 2025-06-30 LAB — HBA1C MFR BLD: 7.5 %

## 2025-06-30 PROCEDURE — 2022F DILAT RTA XM EVC RTNOPTHY: CPT | Performed by: INTERNAL MEDICINE

## 2025-06-30 PROCEDURE — 1036F TOBACCO NON-USER: CPT | Performed by: INTERNAL MEDICINE

## 2025-06-30 PROCEDURE — G8399 PT W/DXA RESULTS DOCUMENT: HCPCS | Performed by: INTERNAL MEDICINE

## 2025-06-30 PROCEDURE — G8427 DOCREV CUR MEDS BY ELIG CLIN: HCPCS | Performed by: INTERNAL MEDICINE

## 2025-06-30 PROCEDURE — 83036 HEMOGLOBIN GLYCOSYLATED A1C: CPT | Performed by: INTERNAL MEDICINE

## 2025-06-30 PROCEDURE — G8417 CALC BMI ABV UP PARAM F/U: HCPCS | Performed by: INTERNAL MEDICINE

## 2025-06-30 PROCEDURE — 1090F PRES/ABSN URINE INCON ASSESS: CPT | Performed by: INTERNAL MEDICINE

## 2025-06-30 PROCEDURE — 1123F ACP DISCUSS/DSCN MKR DOCD: CPT | Performed by: INTERNAL MEDICINE

## 2025-06-30 PROCEDURE — 99213 OFFICE O/P EST LOW 20 MIN: CPT | Performed by: INTERNAL MEDICINE

## 2025-06-30 PROCEDURE — 3017F COLORECTAL CA SCREEN DOC REV: CPT | Performed by: INTERNAL MEDICINE

## 2025-06-30 PROCEDURE — 3051F HG A1C>EQUAL 7.0%<8.0%: CPT | Performed by: INTERNAL MEDICINE

## 2025-06-30 PROCEDURE — 1159F MED LIST DOCD IN RCRD: CPT | Performed by: INTERNAL MEDICINE

## 2025-06-30 PROCEDURE — 3023F SPIROM DOC REV: CPT | Performed by: INTERNAL MEDICINE

## 2025-06-30 PROCEDURE — G0438 PPPS, INITIAL VISIT: HCPCS | Performed by: INTERNAL MEDICINE

## 2025-06-30 RX ORDER — ALENDRONATE SODIUM 70 MG/1
70 TABLET ORAL
Qty: 13 TABLET | Refills: 0 | Status: SHIPPED | OUTPATIENT
Start: 2025-06-30

## 2025-06-30 SDOH — ECONOMIC STABILITY: FOOD INSECURITY: WITHIN THE PAST 12 MONTHS, THE FOOD YOU BOUGHT JUST DIDN'T LAST AND YOU DIDN'T HAVE MONEY TO GET MORE.: NEVER TRUE

## 2025-06-30 SDOH — ECONOMIC STABILITY: FOOD INSECURITY: WITHIN THE PAST 12 MONTHS, YOU WORRIED THAT YOUR FOOD WOULD RUN OUT BEFORE YOU GOT MONEY TO BUY MORE.: NEVER TRUE

## 2025-06-30 NOTE — PATIENT INSTRUCTIONS
Learning About Being Active as an Older Adult  Why is being active important as you get older?     Being active is one of the best things you can do for your health. And it's never too late to start. Being active--or getting active, if you aren't already--has definite benefits. It can:  Give you more energy,  Keep your mind sharp.  Improve balance to reduce your risk of falls.  Help you manage chronic illness with fewer medicines.  No matter how old you are, how fit you are, or what health problems you have, there is a form of activity that will work for you. And the more physical activity you can do, the better your overall health will be.  What kinds of activity can help you stay healthy?  Being more active will make your daily activities easier. Physical activity includes planned exercise and things you do in daily life. There are four types of activity:  Aerobic.  Doing aerobic activity makes your heart and lungs strong.  Includes walking, dancing, and gardening.  Aim for at least 2½ hours spread throughout the week.  It improves your energy and can help you sleep better.  Muscle-strengthening.  This type of activity can help maintain muscle and strengthen bones.  Includes climbing stairs, using resistance bands, and lifting or carrying heavy loads.  Aim for at least twice a week.  It can help protect the knees and other joints.  Stretching.  Stretching gives you better range of motion in joints and muscles.  Includes upper arm stretches, calf stretches, and gentle yoga.  Aim for at least twice a week, preferably after your muscles are warmed up from other activities.  It can help you function better in daily life.  Balancing.  This helps you stay coordinated and have good posture.  Includes heel-to-toe walking, zoey chi, and certain types of yoga.  Aim for at least 3 days a week.  It can reduce your risk of falling.  Even if you have a hard time meeting the recommendations, it's better to be more active

## 2025-06-30 NOTE — PROGRESS NOTES
Medicare Annual Wellness Visit    Irais Max is here for Medicare AWV    Assessment & Plan   Initial Medicare annual wellness visit  -     [facility] Referral to [facility] Clinical [facility]ist  Type 2 diabetes mellitus with hyperglycemia, without long-term current use of insulin (HCC)  Comments:  controlled, c/w current meds  Orders:  -     POCT glycosylated hemoglobin (Hb A1C)  -     [facility] Referral to [facility] Clinical [facility]  Age-related osteoporosis without current pathological fracture  -     alendronate (FOSAMAX) 70 MG tablet; Take 1 tablet by mouth every 7 days Early morning, empty stomach, with full glass of water, stay upright and avoid food intake for 30min after taking the pill, Disp-13 tablet, R-0Normal  -     [facility] Referral to [facility] Clinical [facility]ist  Chronic respiratory failure with hypoxia (HCC)  Pulmonary emphysema, unspecified emphysema type (HCC)  Comments:  controlled, c/w current meds  Hypertensive kidney disease with stage 2 chronic kidney disease  Comments:  controlled, c/w current meds       No follow-ups on file.     Subjective   The following acute and/or chronic problems were also addressed today:    Osteoporosis  Try fosamax      Ventral hernia- pt is using  abody shaper, with cloth support inside it. Instead of a binder from prescription  Working well for her    DIABETES MELLITUS:    Controlled, c/w current meds   Hemoglobin A1C   Date Value Ref Range Status   06/30/2025 7.5 % Final   03/21/2025 8.0 (H) 4.0 - 6.0 % Final   11/20/2024 6.8 (H) 4.0 - 6.0 % Final     Jardiance 27       HYPERTENSION:    Onset more than 2 years ago  Hannah: mild to mod  Usually controlled with current po meds:   Not associated with headaches or blurry vision  No chest pain   Last 3 Encounter BP Readings:     Date:        BP:  BP Readings from Last 3 Encounters:   06/30/25 122/60   05/21/25 (!) 132/52   12/30/24 (!) 128/59     C/w losartan. Lasix 20      B/l leg swelling   R>L due to prior DVT  Ok to c/w lasix - its is

## 2025-07-01 ENCOUNTER — CLINICAL DOCUMENTATION (OUTPATIENT)
Age: 70
End: 2025-07-01

## 2025-07-01 NOTE — ACP (ADVANCE CARE PLANNING)
Anthonyhart  [] Other (Specify) :              Outcomes:  Writer attempted ACP outreach to patient's preferred mobile number ([phone]) - spoke to patient at 11:24 AM.  Patient said she could not talk at time of outreach and requested call back in the afternoon.  Writer attempted ACP outreach for the second time at 3:10 PM - no answer.   Writer left voicemail requesting return call including call back number.  ACP outreach sent to patient's email address on file with a copy of [place] [employer] forms and ACP information sheets \"What is Advance Care Planning\" and \"How to Choose a Health Care Agent\" (ACP Coordinator contact information included).                   [] 3rd -  Date:                Intervention:  [] Spoke with Patient   [] Left Voice mail [] Email / Mail    [] MyChart  [] Other (Specify) :       Outcomes:           []  Additional Outreach -  Date:     (Specify Dates & special circumstances):    Outcomes:         Thank you for this referral.

## 2025-07-22 ENCOUNTER — TELEPHONE (OUTPATIENT)
Dept: PRIMARY CARE CLINIC | Age: 70
End: 2025-07-22

## 2025-07-22 NOTE — TELEPHONE ENCOUNTER
Insurance will not cover patients one touch meter. She needs a new script for the following,    Contour next generation, lancets and testing strips.

## 2025-08-12 ENCOUNTER — TELEPHONE (OUTPATIENT)
Dept: PRIMARY CARE CLINIC | Age: 70
End: 2025-08-12

## 2025-08-12 DIAGNOSIS — R73.09 ELEVATED GLUCOSE: Primary | ICD-10-CM

## 2025-08-12 RX ORDER — IBUPROFEN 100 MG/5ML
SUSPENSION ORAL
Qty: 1 KIT | Refills: 0 | Status: SHIPPED | OUTPATIENT
Start: 2025-08-12

## 2025-08-12 RX ORDER — LANCETS
EACH MISCELLANEOUS
Qty: 100 EACH | Refills: 3 | Status: SHIPPED | OUTPATIENT
Start: 2025-08-12

## (undated) DEVICE — GEL US 20GM NONIRRITATING OVERWRAPPED FILE PCH TRNSMIT

## (undated) DEVICE — INTRODUCER SHTH 0.018 IN 4 FRX70 CM 21 GAX7 CM KT MIC VSI

## (undated) DEVICE — GARMENT,MEDLINE,DVT,INT,CALF,MED, GEN2: Brand: MEDLINE

## (undated) DEVICE — SOLUTION ANTIFOG VIS SYS CLEARIFY LAPSCP

## (undated) DEVICE — CONTAINER,SPECIMEN,4OZ,OR STRL: Brand: MEDLINE

## (undated) DEVICE — DRESSING TRNSPAR W5XL4.5IN FLM SHT SEMIPERMEABLE WIND

## (undated) DEVICE — PROTECTOR ULN NRV PUR FOAM HK LOOP STRP ANATOMICALLY

## (undated) DEVICE — BLADELESS OBTURATOR: Brand: WECK VISTA

## (undated) DEVICE — RADIFOCUS GLIDEWIRE ADVANTAGE GUIDEWIRE: Brand: GLIDEWIRE ADVANTAGE

## (undated) DEVICE — SUTURE MCRYL SZ 4-0 L18IN ABSRB UD L16MM PC-3 3/8 CIR PRIM Y845G

## (undated) DEVICE — SHEET, T, LAPAROTOMY, STERILE: Brand: MEDLINE

## (undated) DEVICE — SEALER ENDOSCP NANO COAT OPN DIV CRV L JAW LIGASURE IMPACT

## (undated) DEVICE — 3M™ IOBAN™ 2 ANTIMICROBIAL INCISE DRAPE 6650EZ: Brand: IOBAN™ 2

## (undated) DEVICE — PAD,NON-ADHERENT,3X8,STERILE,LF,1/PK: Brand: MEDLINE

## (undated) DEVICE — BLADE,CARBON-STEEL,11,STRL,DISPOSABLE,TB: Brand: MEDLINE

## (undated) DEVICE — STRAP ARMBRD W1.5XL32IN FOAM STR YET SFT W/ HK AND LOOP

## (undated) DEVICE — Device

## (undated) DEVICE — SUTURE VCRL SZ 3-0 L27IN ABSRB UD L26MM SH 1/2 CIR J416H

## (undated) DEVICE — ELECTRO LUBE IS A SINGLE PATIENT USE DEVICE THAT IS INTENDED TO BE USED ON ELECTROSURGICAL ELECTRODES TO REDUCE STICKING.: Brand: KEY SURGICAL ELECTRO LUBE

## (undated) DEVICE — KIT DRSG SM W12.5XH3.2XL18CM VAC SH DRP PD W/ CONN DISP RUL

## (undated) DEVICE — GLOVE ORANGE PI 7   MSG9070

## (undated) DEVICE — TOWEL,OR,DSP,ST,NATURAL,DLX,4/PK,20PK/CS: Brand: MEDLINE

## (undated) DEVICE — TROCAR: Brand: KII FIOS FIRST ENTRY

## (undated) DEVICE — PACK SURG ABD SVMMC

## (undated) DEVICE — COVER LT HNDL BLU PLAS

## (undated) DEVICE — SUTURE MCRYL + SZ 4-0 L27IN ABSRB UD L19MM PS-2 3/8 CIR MCP426H

## (undated) DEVICE — SUTURE CHROMIC GUT SZ 3-0 L27IN ABSRB BRN L26MM SH 1/2 CIR G122H

## (undated) DEVICE — CATHETER ANGIOPLSTY OTW 035 6 FRX135 CM 8X60 MM EVERCROSS

## (undated) DEVICE — APPLICATOR MEDICATED 26 CC SOLUTION HI LT ORNG CHLORAPREP

## (undated) DEVICE — SUTURE STRATAFIX SPRL PDS + VLT 3-0 15CM DISPOSABLE/SNGLE SXPP1B420

## (undated) DEVICE — SURGICAL PROCEDURE TRAY CRD CATH SVMMC

## (undated) DEVICE — THROMBECTOMY SET: Brand: ANGIOJET™ ZELANTEDVT™

## (undated) DEVICE — INSUFFLATION TUBING SET WITH FILTER, FUNNEL CONNECTOR AND LUER LOCK: Brand: JOSNOE MEDICAL INC

## (undated) DEVICE — STRAP,POSITIONING,KNEE/BODY,FOAM,4X60": Brand: MEDLINE

## (undated) DEVICE — 1LYRTR 16FR10ML100%SIL UMS SNP: Brand: MEDLINE INDUSTRIES, INC.

## (undated) DEVICE — SPONGE LAP W18XL18IN WHT COT 4 PLY FLD STRUNG RADPQ DISP ST 2 PER PACK

## (undated) DEVICE — GLOVE SURG SZ 6 L12IN FNGR THK79MIL GRN LTX FREE

## (undated) DEVICE — SEAL

## (undated) DEVICE — SUTURE VCRL SZ 3-0 L18IN ABSRB UD L26MM SH 1/2 CIR J864D

## (undated) DEVICE — CANISTER NEG PRSS 1000ML W/ GEL INFOVAC

## (undated) DEVICE — DEVICE INFL 20ML 30ATM DGT FLD DISPNS SYR W ACCESSPLUS BLU

## (undated) DEVICE — SCISSOR SURG METZ CRV TIP

## (undated) DEVICE — DRESSING BORDERED ADH GZ UNIV GEN USE 8INX4IN AND 6INX2IN

## (undated) DEVICE — SUTURE PDS II SZ 1 L96IN ABSRB VLT TP-1 L65MM 1/2 CIR Z880G

## (undated) DEVICE — GLOVE SURG SZ 75 CRM LTX FREE POLYISOPRENE POLYMER BEAD ANTI

## (undated) DEVICE — STAPLER INT L75MM CUT LN L73MM STPL LN L77MM BLU B FRM 8

## (undated) DEVICE — TIP COVER ACCESSORY

## (undated) DEVICE — INSUFFLATION NEEDLE TO ESTABLISH PNEUMOPERITONEUM.: Brand: INSUFFLATION NEEDLE

## (undated) DEVICE — SYRINGE IRRIG 60ML SFT PLIABLE BLB EZ TO GRP 1 HND USE W/

## (undated) DEVICE — SUTURE PERMAHAND SZ 3-0 L18IN NONABSORBABLE BLK L26MM SH C013D

## (undated) DEVICE — ARM DRAPE

## (undated) DEVICE — PINNACLE INTRODUCER SHEATH: Brand: PINNACLE

## (undated) DEVICE — MARKER,SKIN,WI/RULER AND LABELS: Brand: MEDLINE

## (undated) DEVICE — ANCHOR TISSUE RETRIEVAL SYSTEM, BAG SIZE 125 ML, PORT SIZE 8 MM: Brand: ANCHOR TISSUE RETRIEVAL SYSTEM

## (undated) DEVICE — SUTURE STRATAFIX SYMMETRIC PDS + SZ 0 L18IN ABSRB L36MM SXPP1A401

## (undated) DEVICE — BINDER ABD 3XL H9XL71 82IN E UNISX 3 PNL

## (undated) DEVICE — STAPLER ENDO SURG GIA ROTIC 30X3.5MM

## (undated) DEVICE — DRAPE,REIN 53X77,STERILE: Brand: MEDLINE

## (undated) DEVICE — GAUZE,SPONGE,FLUFF,6"X6.75",STRL,5/TRAY: Brand: MEDLINE

## (undated) DEVICE — 48" PROBE COVER W/GEL, ULTRASOUND, STERILE: Brand: SITE-RITE

## (undated) DEVICE — SOLUTION IRRIG 1000ML STRL H2O USP PLAS POUR BTL

## (undated) DEVICE — STAPLER ECHELON 3000 45MM STANDARD

## (undated) DEVICE — COVER OR TBL W40XL90IN ABSRB STD AND GRIPPY BK SAHARA

## (undated) DEVICE — DRAPE, SLUSH XL, 44X66, STERILE: Brand: MEDLINE

## (undated) DEVICE — SYRINGE 20ML LL S/C 50

## (undated) DEVICE — C-ARM: Brand: UNBRANDED

## (undated) DEVICE — CATHETER SUPP L90CM 50MM MRK BND SPC GWIRE 0.035IN

## (undated) DEVICE — 450 ML BOTTLE OF 0.05% CHLORHEXIDINE GLUCONATE IN 99.95% STERILE WATER FOR IRRIGATION, USP AND APPLICATOR.: Brand: IRRISEPT ANTIMICROBIAL WOUND LAVAGE

## (undated) DEVICE — DECANTER BAG 9": Brand: MEDLINE INDUSTRIES, INC.

## (undated) DEVICE — DRAPE,UTILTY,TAPE,15X26, 4EA/PK: Brand: MEDLINE

## (undated) DEVICE — LIQUIBAND RAPID ADHESIVE 36/CS 0.8ML: Brand: MEDLINE

## (undated) DEVICE — SHEET,DRAPE,70X100,STERILE: Brand: MEDLINE

## (undated) DEVICE — RELOAD STPL L75MM OPN H3.8MM CLS 1.5MM WIRE DIA0.2MM REG

## (undated) DEVICE — CATHETER ANGIO 4FR L65CM 0.035IN VIS OMNI FLUSH-0 SFT TIP

## (undated) DEVICE — PLUMEPORT ACTIV LAPAROSCOPIC SMOKE FILTRATION DEVICE: Brand: PLUMEPORT ACTIVE